# Patient Record
Sex: MALE | Race: WHITE | Employment: OTHER | URBAN - METROPOLITAN AREA
[De-identification: names, ages, dates, MRNs, and addresses within clinical notes are randomized per-mention and may not be internally consistent; named-entity substitution may affect disease eponyms.]

---

## 2017-02-07 ENCOUNTER — HOSPITAL ENCOUNTER (OUTPATIENT)
Dept: LAB | Age: 82
Discharge: HOME | End: 2017-02-07
Payer: MEDICARE

## 2017-02-07 DIAGNOSIS — I11.9 BENIGN HYPERTENSIVE HEART DISEASE WITHOUT HEART FAILURE: ICD-10-CM

## 2017-02-07 DIAGNOSIS — E78.5 DYSLIPIDEMIA: ICD-10-CM

## 2017-02-07 LAB
ALBUMIN SERPL BCP-MCNC: 3.7 G/DL (ref 3.4–5)
ALBUMIN/GLOB SERPL: 1.2 {RATIO} (ref 0.8–1.7)
ALP SERPL-CCNC: 71 U/L (ref 45–117)
ALT SERPL-CCNC: 18 U/L (ref 16–61)
ANION GAP BLD CALC-SCNC: 8 MMOL/L (ref 3–18)
AST SERPL W P-5'-P-CCNC: 19 U/L (ref 15–37)
BILIRUB SERPL-MCNC: 0.7 MG/DL (ref 0.2–1)
BUN SERPL-MCNC: 14 MG/DL (ref 7–18)
BUN/CREAT SERPL: 14 (ref 12–20)
CALCIUM SERPL-MCNC: 8.9 MG/DL (ref 8.5–10.1)
CHLORIDE SERPL-SCNC: 104 MMOL/L (ref 100–108)
CHOLEST SERPL-MCNC: 151 MG/DL
CO2 SERPL-SCNC: 29 MMOL/L (ref 21–32)
CREAT SERPL-MCNC: 0.98 MG/DL (ref 0.6–1.3)
GLOBULIN SER CALC-MCNC: 3.2 G/DL (ref 2–4)
GLUCOSE SERPL-MCNC: 95 MG/DL (ref 74–99)
HDLC SERPL-MCNC: 57 MG/DL (ref 40–60)
HDLC SERPL: 2.6 {RATIO} (ref 0–5)
LDLC SERPL CALC-MCNC: 80.6 MG/DL (ref 0–100)
LIPID PROFILE,FLP: NORMAL
POTASSIUM SERPL-SCNC: 4.3 MMOL/L (ref 3.5–5.5)
PROT SERPL-MCNC: 6.9 G/DL (ref 6.4–8.2)
SODIUM SERPL-SCNC: 141 MMOL/L (ref 136–145)
TRIGL SERPL-MCNC: 67 MG/DL (ref ?–150)
VLDLC SERPL CALC-MCNC: 13.4 MG/DL

## 2017-02-07 PROCEDURE — 80053 COMPREHEN METABOLIC PANEL: CPT | Performed by: INTERNAL MEDICINE

## 2017-02-07 PROCEDURE — 36415 COLL VENOUS BLD VENIPUNCTURE: CPT | Performed by: INTERNAL MEDICINE

## 2017-02-07 PROCEDURE — 80061 LIPID PANEL: CPT | Performed by: INTERNAL MEDICINE

## 2017-02-21 ENCOUNTER — OFFICE VISIT (OUTPATIENT)
Dept: INTERNAL MEDICINE CLINIC | Age: 82
End: 2017-02-21

## 2017-02-21 VITALS
TEMPERATURE: 97.6 F | RESPIRATION RATE: 18 BRPM | SYSTOLIC BLOOD PRESSURE: 134 MMHG | HEART RATE: 65 BPM | WEIGHT: 169 LBS | OXYGEN SATURATION: 99 % | HEIGHT: 68 IN | BODY MASS INDEX: 25.61 KG/M2 | DIASTOLIC BLOOD PRESSURE: 66 MMHG

## 2017-02-21 DIAGNOSIS — E78.5 DYSLIPIDEMIA: ICD-10-CM

## 2017-02-21 DIAGNOSIS — I11.9 BENIGN HYPERTENSIVE HEART DISEASE WITHOUT HEART FAILURE: ICD-10-CM

## 2017-02-21 DIAGNOSIS — Z13.39 SCREENING FOR ALCOHOLISM: ICD-10-CM

## 2017-02-21 DIAGNOSIS — Z00.00 ROUTINE GENERAL MEDICAL EXAMINATION AT A HEALTH CARE FACILITY: Primary | ICD-10-CM

## 2017-02-21 NOTE — PROGRESS NOTES
Patient is in the office today for a 6 month follow up, Medicare Wellness visit    Do you have an Advance Directive no  Do you want more information declined    1. Have you been to the ER, urgent care clinic since your last visit? Hospitalized since your last visit? No    2. Have you seen or consulted any other health care providers outside of the [address] [place] [address] since your last visit? Include any pap smears or colon screening. No        This is a Subsequent Medicare Annual Wellness Visit providing Personalized Prevention Plan Services (PPPS) (Performed 12 months after initial AWV and PPPS )    I have reviewed the patient's medical history in detail and updated the computerized patient record. History     Past Medical History   Diagnosis Date    Atrial fibrillation      paroxysmal   CHADS score 2  (-CHF, +HTN, +AGE, -DM, -CVA)    Back pain     Dyslipidemia     GERD     Hypertrension     Nephrolithiasis     Pacemaker      ,       [employer]    Sick sinus syndrome       Past Surgical History   Procedure Laterality Date    Hx appendectomy       in his early 's    Hx cataract removal         bilateral    Pr cardiac surg procedure unlist       Pacemaker     Current Outpatient Prescriptions   Medication Sig Dispense Refill    metoprolol succinate (TOPROL-XL) 50 mg XL tablet TAKE ONE TABLET BY MOUTH ONCE DAILY 30 Tab 11    budesonide (RHINOCORT AQUA) 32 mcg/actuation nasal spray 2 Sprays by Both Nostrils route daily.  calcium carbonate (TUMS) 200 mg calcium (500 mg) chew Take 1 Tab by mouth daily.        Allergies   Allergen Reactions    Lisinopril Other (comments)     Irregular heart rate     Family History   Problem Relation Age of Onset    Other Father 39      from intestinal blockage in [de-identified]    Stroke Mother 68     Social History   Substance Use Topics    Smoking status: Never Smoker    Smokeless tobacco: Never Used    Alcohol use 2.5 oz/week     5 Glasses of wine per week     Patient Active Problem List   Diagnosis Code    Hypertrension I11.9    Dyslipidemia E78.5    Atrial fibrillation I48.0    Sick sinus syndrome I49.5    ACP (advance care planning) Z71.89       Depression Risk Factor Screening:     PHQ 2 / 9, over the last two weeks 2/21/2017   Little interest or pleasure in doing things Not at all   Feeling down, depressed or hopeless Not at all   Total Score PHQ 2 0     Alcohol Risk Factor Screening:   Patient states he drinks one glass of red wine nightly. Functional Ability and Level of Safety:     Hearing Loss   Patient states he has slight hearing loss. Activities of Daily Living   Self-care. Requires assistance with: no ADLs    Fall Risk     Fall Risk Assessment, last 12 mths 2/21/2017   Able to walk? Yes   Fall in past 12 months? No     Abuse Screen   Patient is not abused    Review of Systems   A comprehensive review of systems was negative except for that written in the HPI. Physical Examination     Evaluation of Cognitive Function:  Mood/affect:  neutral  Appearance: age appropriate  Family member/caregiver input: none    Visit Vitals    /66 (BP 1 Location: Left arm, BP Patient Position: Sitting)    Pulse 65    Temp 97.6 °F (36.4 °C) (Oral)    Resp 18    Ht 5' 8\" (1.727 m)    Wt 169 lb (76.7 kg)    SpO2 99%    BMI 25.7 kg/m2       Patient Care Team:  Xin Muir MD as PCP - General (Internal Medicine)  Becca Sands MD (Cardiology)  Dennis Monge MD (Ophthalmology)    Advice/Referrals/Counseling   Education and counseling provided:  Are appropriate based on today's review and evaluation  End-of-Life planning (with patient's consent)  Pneumococcal Vaccine  Influenza Vaccine    Glaucoma Screening- 4 years ago pt encouraged to f/u  Pneumonia Vaccine- declines   Shingles Vaccine- declines  Tdap Vaccine- not UTD   Colonoscopy- Declines. Never had one.  Declines FIT test.   PSA- declines   Advance Directive- Does not have one. Will consider getting one. 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 Assessment/Plan       ICD-10-CM ICD-9-CM    1. Routine general medical examination at a health care facility Z00.00 V70.0    2. Screening for alcoholism Z13.89 V79.1    3. Hypertrension I11.9 402.10    4. Dyslipidemia E78.5 272.4    . A comprehensive 5 year plan for medical care and screening exams was reviewed with pt and they received a copy of it.

## 2017-02-21 NOTE — MR AVS SNAPSHOT
Visit Information Date & Time Provider Department Dept. Phone Encounter #  
 2/21/2017  1:00 PM Duncan Osei MD [facility] at [facility]  Follow-up Instructions Return in about 6 months (around 8/21/2017) for labs 1 week before. Your Appointments 3/15/2017  3:40 PM  
[facility] with [facility] ([address]) Appt Note: 3 mth carelink [place] [id number]  
[phone] [facility] [address] Upcoming Health Maintenance Date Due DTaP/Tdap/Td series (1 - Tdap) 2/27/1954 ZOSTER VACCINE AGE 60> 2/27/1993 GLAUCOMA SCREENING Q2Y 2/27/1998 INFLUENZA AGE 9 TO ADULT 8/1/2016 MEDICARE YEARLY EXAM 2/16/2017 Pneumococcal 65+ Low/Medium Risk (1 of 2 - PCV13) 2/28/2017* *Topic was postponed. The date shown is not the original due date. Allergies as of 2/21/2017  Review Complete On: 2/21/2017 By: Duncan Osei MD  
  
 Severity Noted Reaction Type Reactions Lisinopril    Other (comments) Irregular heart rate Current Immunizations  Never Reviewed No immunizations on file. Not reviewed this visit You Were Diagnosed With   
  
 Codes Comments Routine general medical examination at a health care facility    -  Primary ICD-10-CM: Z00.00 ICD-9-CM: V70.0 Screening for alcoholism     ICD-10-CM: Z13.89 ICD-9-CM: V79.1 Benign hypertensive heart disease without heart failure     ICD-10-CM: I11.9 ICD-9-CM: 402.10 Dyslipidemia     ICD-10-CM: E78.5 ICD-9-CM: 272.4 Vitals BP Pulse Temp Resp Height(growth percentile) Weight(growth percentile) 134/66 (BP 1 Location: Left arm, BP Patient Position: Sitting) 65 97.6 °F (36.4 °C) (Oral) 18 5' 8\" (1.727 m) 169 lb (76.7 kg) SpO2 BMI Smoking Status 99% 25.7 kg/m2 Never Smoker Vitals History BMI and BSA Data Body Mass Index Body Surface Area 25.7 kg/m 2 1.92 m 2 Preferred Pharmacy Pharmacy Name Phone [phone]  [phone] Your Updated Medication List  
  
   
This list is accurate as of: 2/21/17  1:25 PM.  Always use your most recent med list.  
  
  
  
  
 calcium carbonate 200 mg calcium (500 mg) Elma Hectoraldair Commonly known as:  TUMS Take 1 Tab by mouth daily. metoprolol succinate 50 mg XL tablet Commonly known as:  TOPROL-XL  
TAKE ONE TABLET BY MOUTH ONCE DAILY RHINOCORT AQUA 32 mcg/actuation nasal spray Generic drug:  budesonide 2 Sprays by Both Nostrils route daily. Follow-up Instructions Return in about 6 months (around 8/21/2017) for labs 1 week before. Patient Instructions Advance Directives: Care Instructions Your Care Instructions An advance directive is a legal way to state your wishes at the end of your life. It tells your family and your doctor what to do if you can no longer say what you want. There are two main types of advance directives. You can change them any time that your wishes change. · A living will tells your family and your doctor your wishes about life support and other treatment. · A medical power of  lets you name a person to make treatment decisions for you when you can't speak for yourself. This person is called a health care agent. If you do not have an advance directive, decisions about your medical care may be made by a doctor or a  who doesn't know you. It may help to think of an advance directive as a gift to the people who care for you. If you have one, they won't have to make tough decisions by themselves. Follow-up care is a key part of your treatment and safety.  Be sure to make and go to all appointments, and call your doctor if you are having problems. It's also a good idea to know your test results and keep a list of the medicines you take. How can you care for yourself at home? · Discuss your wishes with your loved ones and your doctor. This way, there are no surprises. · Many states have a unique form. Or you might use a universal form that has been approved by many states. This kind of form can sometimes be completed and stored online. Your electronic copy will then be available wherever you have a connection to the Internet. In most cases, doctors will respect your wishes even if you have a form from a different state. · You don't need a  to do an advance directive. But you may want to get legal advice. · Think about these questions when you prepare an advance directive: ¨ Who do you want to make decisions about your medical care if you are not able to? Many people choose a family member, close friend, or doctor. ¨ Do you know enough about life support methods that might be used? If not, talk to your doctor so you understand. ¨ What are you most afraid of that might happen? You might be afraid of having pain, losing your independence, or being kept alive by machines. ¨ Where would you prefer to die? Choices include your home, a hospital, or a nursing home. ¨ Would you like to have information about hospice care to support you and your family? ¨ Do you want to donate organs when you die? ¨ Do you want certain Druze practices performed before you die? If so, put your wishes in the advance directive. · Read your advance directive every year, and make changes as needed. When should you call for help? Be sure to contact your doctor if you have any questions. Where can you learn more? Go to http://kerline-bernabe.info/. Enter R264 in the search box to learn more about \"Advance Directives: Care Instructions. \" Current as of: February 24, 2016 Content Version: 11.1 © 4100-2004 [employer]. Care instructions adapted under license by [employer] (which disclaims liability or warranty for this information). If you have questions about a medical condition or this instruction, always ask your healthcare professional. Camilo Olmos any warranty or liability for your use of this information. Medicare Part B Preventive Services Limitations Recommendation Scheduled Bone Mass Measurement 
(age 72 & older, biennial) Requires diagnosis related to osteoporosis or estrogen deficiency. Biennial benefit unless patient has history of long-term glucocorticoid tx or baseline is needed because initial test was by other method  NA Cardiovascular Screening Blood Tests (every 5 years) Total cholesterol, HDL, Triglycerides Order as a panel if possible  2/2017 Colorectal Cancer Screening 
-Fecal occult blood test (annual) -Flexible sigmoidoscopy (5y) 
-Screening colonoscopy (10y) -Barium Enema   NA Counseling to Prevent Tobacco Use (up to 8 sessions per year) - Counseling greater than 3 and up to 10 minutes - Counseling greater than 10 minutes Patients must be asymptomatic of tobacco-related conditions to receive as preventive service  NA Diabetes Screening Tests (at least every 3 years, Medicare covers annually or at 6-month intervals for prediabetic patients) Fasting blood sugar (FBS) or glucose tolerance test (GTT) Patient must be diagnosed with one of the following: 
-Hypertension, Dyslipidemia, obesity, previous impaired FBS or GTT 
Or any two of the following: overweight, FH of diabetes, age ? 72, history of gestational diabetes, birth of baby weighing more than 9 pounds  2/2017 Diabetes Self-Management Training (DSMT) (no USPSTF recommendation) Requires referral by treating physician for patient with diabetes or renal disease.  10 hours of initial DSMT session of no less than 30 minutes each in a continuous 12-month period. 2 hours of follow-up DSMT in subsequent years. NA Glaucoma Screening (no USPSTF recommendation) Diabetes mellitus, family history, , age 48 or over,  American, age 72 or over  Dr. Robb Chacon Human Immunodeficiency Virus (HIV) Screening (annually for increased risk patients) HIV-1 and HIV-2 by EIA, BOWEN, rapid antibody test, or oral mucosa transudate Patient must be at increased risk for HIV infection per USPSTF guidelines or pregnant. Tests covered annually for patients at increased risk. Pregnant patients may receive up to 3 test during pregnancy. NA Medical Nutrition Therapy (MNT) (for diabetes or renal disease not recommended schedule) Requires referral by treating physician for patient with diabetes or renal disease. Can be provided in same year as diabetes self-management training (DSMT), and CMS recommends medical nutrition therapy take place after DSMT. Up to 3 hours for initial year and 2 hours in subsequent years. NA Prostate Cancer Screening (annually up to age 76) - Digital rectal exam (REMY) - Prostate specific antigen (PSA) Annually (age 48 or over), REMY not paid separately when covered E/M service is provided on same date Men up to age 76 may need a screening blood test for prostate cancer at certain intervals, depending on their personal and family history. This decision is between the patient and his provider. NA Seasonal Influenza Vaccination (annually)   declined Pneumococcal Vaccination (once after 65)   Declined Hepatitis B Vaccinations (if medium/high risk) Medium/high risk factors:  End-stage renal disease, Hemophiliacs who received Factor VIII or IX concentrates, Clients of institutions for the mentally retarded, Persons who live in the same house as a HepB virus carrier, Homosexual men, Illicit injectable drug abusers. NA Shingles Vaccination A shingles vaccine is also recommended once in a lifetime after age 61  Declined Ultrasound Screening for Abdominal Aortic Aneurysm (AAA) (once) Patient must be referred through IPPE and not have had a screening for abdominal aortic aneurysm before under Medicare. Limited to patients who meet one of the following criteria: 
- Men who are 73-68 years old and have smoked more than 100 cigarettes in their lifetime. 
-Anyone with a FH of AAA 
-Anyone recommended for screening by USPSTF  NA Introducing [facility]! [employer] introduces EarlySense patient portal. Now you can access parts of your medical record, email your doctor's office, and request medication refills online. 1. In your internet browser, go to [email or web address] 2. Click on the First Time User? Click Here link in the Sign In box. You will see the New Member Sign Up page. 3. Enter your EarlySense Access Code exactly as it appears below. You will not need to use this code after youve completed the sign-up process. If you do not sign up before the expiration date, you must request a new code. · EarlySense Access Code: [email or web address] Expires: 3/9/2017 11:20 AM 
 
4. Enter the last four digits of your Social Security Number (xxxx) and Date of Birth (mm/dd/yyyy) as indicated and click Submit. You will be taken to the next sign-up page. 5. Create a EarlySense ID. This will be your EarlySense login ID and cannot be changed, so think of one that is secure and easy to remember. 6. Create a EarlySense password. You can change your password at any time. 7. Enter your Password Reset Question and Answer. This can be used at a later time if you forget your password. 8. Enter your e-mail address. You will receive e-mail notification when new information is available in [address] 9. Click Sign Up. You can now view and download portions of your medical record. 10. Click the Download Summary menu link to download a portable copy of your medical information. If you have questions, please visit the Frequently Asked Questions section of the [employer] website. Remember, EeBria is NOT to be used for urgent needs. For medical emergencies, dial 911. Now available from your iPhone and Android! Please provide this summary of care documentation to your next provider. Your primary care clinician is listed as JOHAN NUGENT. If you have any questions after today's visit, please call [phone].

## 2017-02-21 NOTE — PROGRESS NOTES
Subjective:       Chief Complaint  The patient presents for follow up of hypertension and high cholesterol. PATRIC Armstrong is a 80 y.o. male seen for follow up of hyperlipidemia. Healso has hypertension. Hypertension well controlled, no significant medication side effects noted, on Toprol, hyperlipidemia fairly well controlled at age 80. Pt is followed by cardiology. Allergies are stable on Zyrtec and Nasacort prn and allergy shots. Diet and Lifestyle: generally follows a low fat low cholesterol diet, exercises regularly    Home BP Monitoring: is not measured at home. Other symptoms and concerns: pt's back pain is much improved with doing core exercises daily. Pt has some occasional dizziness when he plays golf but it improves when he drinks fluids. Current Outpatient Prescriptions   Medication Sig    metoprolol succinate (TOPROL-XL) 50 mg XL tablet TAKE ONE TABLET BY MOUTH ONCE DAILY    budesonide (RHINOCORT AQUA) 32 mcg/actuation nasal spray 2 Sprays by Both Nostrils route daily.  calcium carbonate (TUMS) 200 mg calcium (500 mg) chew Take 1 Tab by mouth daily. No current facility-administered medications for this visit.               Review of Systems  Respiratory: negative for dyspnea on exertion  Cardiovascular: negative for chest pain    Objective:     Visit Vitals    /66 (BP 1 Location: Left arm, BP Patient Position: Sitting)    Pulse 65    Temp 97.6 °F (36.4 °C) (Oral)    Resp 18    Ht 5' 8\" (1.727 m)    Wt 169 lb (76.7 kg)    SpO2 99%    BMI 25.7 kg/m2        Eyes - pupils equal and reactive, extraocular eye movements intact  Mouth - mucous membranes moist, pharynx normal without lesions  Neck - supple, no significant adenopathy, carotids upstroke normal bilaterally, no bruits  Chest - clear to auscultation, no wheezes, rales or rhonchi, symmetric air entry  Heart - normal rate, regular rhythm, normal S1, S2, 3/5 systolic murmur at RSB  Extremities - peripheral pulses normal, no pedal edema, no clubbing or cyanosis      Labs:   Lab Results   Component Value Date/Time    Cholesterol, total 151 02/07/2017 08:53 AM    HDL Cholesterol 57 02/07/2017 08:53 AM    LDL, calculated 80.6 02/07/2017 08:53 AM    Triglyceride 67 02/07/2017 08:53 AM    CHOL/HDL Ratio 2.6 02/07/2017 08:53 AM     Lab Results   Component Value Date/Time    ALT (SGPT) 18 02/07/2017 08:53 AM    AST (SGOT) 19 02/07/2017 08:53 AM    Alk. phosphatase 71 02/07/2017 08:53 AM    Bilirubin, total 0.7 02/07/2017 08:53 AM     Lab Results   Component Value Date/Time    GFR est AA >60 02/07/2017 08:53 AM    GFR est non-AA >60 02/07/2017 08:53 AM    Creatinine 0.98 02/07/2017 08:53 AM    BUN 14 02/07/2017 08:53 AM    Sodium 141 02/07/2017 08:53 AM    Potassium 4.3 02/07/2017 08:53 AM    Chloride 104 02/07/2017 08:53 AM    CO2 29 02/07/2017 08:53 AM              Assessment / Plan     Hypertension well controlled, on Toprol  Hyperlipidemia fairly well controlled with diet       ICD-10-CM ICD-9-CM    1. Routine general medical examination at a health care facility Z00.00 V70.0    2. Screening for alcoholism Z13.89 V79.1    3. Hypertrension J68.0 772.80 METABOLIC PANEL, COMPREHENSIVE   4. Dyslipidemia E78.5 272.4 LIPID PANEL                 Low cholesterol diet, weight control and daily exercise discussed. Follow-up Disposition:  Return in about 6 months (around 8/21/2017) for labs 1 week before. Reviewed plan of care. Patient has provided input and agrees with goals.

## 2017-02-21 NOTE — PATIENT INSTRUCTIONS
Advance Directives: Care Instructions  Your Care Instructions  An advance directive is a legal way to state your wishes at the end of your life. It tells your family and your doctor what to do if you can no longer say what you want. There are two main types of advance directives. You can change them any time that your wishes change. · A living will tells your family and your doctor your wishes about life support and other treatment. · A medical power of  lets you name a person to make treatment decisions for you when you can't speak for yourself. This person is called a health care agent. If you do not have an advance directive, decisions about your medical care may be made by a doctor or a  who doesn't know you. It may help to think of an advance directive as a gift to the people who care for you. If you have one, they won't have to make tough decisions by themselves. Follow-up care is a key part of your treatment and safety. Be sure to make and go to all appointments, and call your doctor if you are having problems. It's also a good idea to know your test results and keep a list of the medicines you take. How can you care for yourself at home? · Discuss your wishes with your loved ones and your doctor. This way, there are no surprises. · Many states have a unique form. Or you might use a universal form that has been approved by many states. This kind of form can sometimes be completed and stored online. Your electronic copy will then be available wherever you have a connection to the Internet. In most cases, doctors will respect your wishes even if you have a form from a different state. · You don't need a  to do an advance directive. But you may want to get legal advice. · Think about these questions when you prepare an advance directive:  ¨ Who do you want to make decisions about your medical care if you are not able to?  Many people choose a family member, close friend, or doctor. ¨ Do you know enough about life support methods that might be used? If not, talk to your doctor so you understand. ¨ What are you most afraid of that might happen? You might be afraid of having pain, losing your independence, or being kept alive by machines. ¨ Where would you prefer to die? Choices include your home, a hospital, or a nursing home. ¨ Would you like to have information about hospice care to support you and your family? ¨ Do you want to donate organs when you die? ¨ Do you want certain Roman Catholic practices performed before you die? If so, put your wishes in the advance directive. · Read your advance directive every year, and make changes as needed. When should you call for help? Be sure to contact your doctor if you have any questions. Where can you learn more? Go to http://kerlinecheerappbernabe.info/. Enter R264 in the search box to learn more about \"Advance Directives: Care Instructions. \"  Current as of: February 24, 2016  Content Version: 11.1  © 1343-4148 [employer]. Care instructions adapted under license by [employer] (which disclaims liability or warranty for this information). If you have questions about a medical condition or this instruction, always ask your healthcare professional. [place] [address] any warranty or liability for your use of this information. Medicare Part B Preventive Services Limitations Recommendation Scheduled   Bone Mass Measurement  (age 72 & older, biennial) Requires diagnosis related to osteoporosis or estrogen deficiency.  Biennial benefit unless patient has history of long-term glucocorticoid tx or baseline is needed because initial test was by other method  NA   Cardiovascular Screening Blood Tests (every 5 years)  Total cholesterol, HDL, Triglycerides Order as a panel if possible  2/2017   Colorectal Cancer Screening  -Fecal occult blood test (annual)  -Flexible sigmoidoscopy (5y)  -Screening colonoscopy (10y)  -Barium Enema   NA   Counseling to Prevent Tobacco Use (up to 8 sessions per year)  - Counseling greater than 3 and up to 10 minutes  - Counseling greater than 10 minutes Patients must be asymptomatic of tobacco-related conditions to receive as preventive service  NA   Diabetes Screening Tests (at least every 3 years, Medicare covers annually or at 6-month intervals for prediabetic patients)    Fasting blood sugar (FBS) or glucose tolerance test (GTT) Patient must be diagnosed with one of the following:  -Hypertension, Dyslipidemia, obesity, previous impaired FBS or GTT  Or any two of the following: overweight, FH of diabetes, age ? 72, history of gestational diabetes, birth of baby weighing more than 9 pounds  2/2017   Diabetes Self-Management Training (DSMT) (no USPSTF recommendation) Requires referral by treating physician for patient with diabetes or renal disease. 10 hours of initial DSMT session of no less than 30 minutes each in a continuous 12-month period. 2 hours of follow-up DSMT in subsequent years. NA   Glaucoma Screening (no USPSTF recommendation) Diabetes mellitus, family history, , age 48 or over,  American, age 72 or over  Dr. Ellie Mcgee (HIV) Screening (annually for increased risk patients)  HIV-1 and HIV-2 by EIA, BOWEN, rapid antibody test, or oral mucosa transudate Patient must be at increased risk for HIV infection per USPSTF guidelines or pregnant. Tests covered annually for patients at increased risk. Pregnant patients may receive up to 3 test during pregnancy. NA   Medical Nutrition Therapy (MNT) (for diabetes or renal disease not recommended schedule) Requires referral by treating physician for patient with diabetes or renal disease. Can be provided in same year as diabetes self-management training (DSMT), and CMS recommends medical nutrition therapy take place after DSMT.   Up to 3 hours for initial year and 2 hours in subsequent years. NA   Prostate Cancer Screening (annually up to age 76)  - Digital rectal exam (REMY)  - Prostate specific antigen (PSA) Annually (age 48 or over), REMY not paid separately when covered E/M service is provided on same date  Men up to age 76 may need a screening blood test for prostate cancer at certain intervals, depending on their personal and family history. This decision is between the patient and his provider. NA   Seasonal Influenza Vaccination (annually)   declined     Pneumococcal Vaccination (once after 72)   Declined   Hepatitis B Vaccinations (if medium/high risk) Medium/high risk factors:  End-stage renal disease,  Hemophiliacs who received Factor VIII or IX concentrates, Clients of institutions for the mentally retarded, Persons who live in the same house as a HepB virus carrier, Homosexual men, Illicit injectable drug abusers. NA   Shingles Vaccination A shingles vaccine is also recommended once in a lifetime after age 61  Declined   Ultrasound Screening for Abdominal Aortic Aneurysm (AAA) (once) Patient must be referred through [facility] and not have had a screening for abdominal aortic aneurysm before under Medicare.   Limited to patients who meet one of the following criteria:  - Men who are 73-68 years old and have smoked more than 100 cigarettes in their lifetime.  -Anyone with a FH of AAA  -Anyone recommended for screening by USPSTF  NA

## 2017-03-15 ENCOUNTER — OFFICE VISIT (OUTPATIENT)
Dept: CARDIOLOGY CLINIC | Age: 82
End: 2017-03-15

## 2017-03-15 DIAGNOSIS — I49.5 SICK SINUS SYNDROME (HCC): Primary | ICD-10-CM

## 2017-03-15 DIAGNOSIS — Z95.0 CARDIAC PACEMAKER IN SITU: ICD-10-CM

## 2017-04-21 NOTE — PROGRESS NOTES
I have personally seen and evaluated the device findings. Interrogation reviewed and I agree with assessment.     Jordon Velez

## 2017-06-21 ENCOUNTER — OFFICE VISIT (OUTPATIENT)
Dept: CARDIOLOGY CLINIC | Age: 82
End: 2017-06-21

## 2017-06-21 DIAGNOSIS — I48.0 PAROXYSMAL ATRIAL FIBRILLATION (HCC): Primary | ICD-10-CM

## 2017-06-21 DIAGNOSIS — I49.5 SICK SINUS SYNDROME (HCC): ICD-10-CM

## 2017-06-21 DIAGNOSIS — Z95.9 CARDIAC DEVICE IN SITU: ICD-10-CM

## 2017-06-28 NOTE — PROGRESS NOTES
I have personally seen and evaluated the device findings. Interrogation reviewed and I agree with assessment.     Christian Funes

## 2017-08-07 ENCOUNTER — HOSPITAL ENCOUNTER (OUTPATIENT)
Dept: LAB | Age: 82
Discharge: HOME | End: 2017-08-07
Payer: MEDICARE

## 2017-08-07 DIAGNOSIS — I11.9 BENIGN HYPERTENSIVE HEART DISEASE WITHOUT HEART FAILURE: ICD-10-CM

## 2017-08-07 DIAGNOSIS — E78.5 DYSLIPIDEMIA: ICD-10-CM

## 2017-08-07 LAB
ALBUMIN SERPL BCP-MCNC: 3.6 G/DL (ref 3.4–5)
ALBUMIN/GLOB SERPL: 1.1 {RATIO} (ref 0.8–1.7)
ALP SERPL-CCNC: 69 U/L (ref 45–117)
ALT SERPL-CCNC: 15 U/L (ref 16–61)
ANION GAP BLD CALC-SCNC: 9 MMOL/L (ref 3–18)
AST SERPL W P-5'-P-CCNC: 16 U/L (ref 15–37)
BILIRUB SERPL-MCNC: 0.6 MG/DL (ref 0.2–1)
BUN SERPL-MCNC: 17 MG/DL (ref 7–18)
BUN/CREAT SERPL: 17 (ref 12–20)
CALCIUM SERPL-MCNC: 8.6 MG/DL (ref 8.5–10.1)
CHLORIDE SERPL-SCNC: 105 MMOL/L (ref 100–108)
CHOLEST SERPL-MCNC: 147 MG/DL
CO2 SERPL-SCNC: 27 MMOL/L (ref 21–32)
CREAT SERPL-MCNC: 1 MG/DL (ref 0.6–1.3)
GLOBULIN SER CALC-MCNC: 3.2 G/DL (ref 2–4)
GLUCOSE SERPL-MCNC: 92 MG/DL (ref 74–99)
HDLC SERPL-MCNC: 59 MG/DL (ref 40–60)
HDLC SERPL: 2.5 {RATIO} (ref 0–5)
LDLC SERPL CALC-MCNC: 76.4 MG/DL (ref 0–100)
LIPID PROFILE,FLP: NORMAL
POTASSIUM SERPL-SCNC: 4.7 MMOL/L (ref 3.5–5.5)
PROT SERPL-MCNC: 6.8 G/DL (ref 6.4–8.2)
SODIUM SERPL-SCNC: 141 MMOL/L (ref 136–145)
TRIGL SERPL-MCNC: 58 MG/DL (ref ?–150)
VLDLC SERPL CALC-MCNC: 11.6 MG/DL

## 2017-08-07 PROCEDURE — 80061 LIPID PANEL: CPT | Performed by: INTERNAL MEDICINE

## 2017-08-07 PROCEDURE — 36415 COLL VENOUS BLD VENIPUNCTURE: CPT | Performed by: INTERNAL MEDICINE

## 2017-08-07 PROCEDURE — 80053 COMPREHEN METABOLIC PANEL: CPT | Performed by: INTERNAL MEDICINE

## 2017-08-18 ENCOUNTER — OFFICE VISIT (OUTPATIENT)
Dept: INTERNAL MEDICINE CLINIC | Age: 82
End: 2017-08-18

## 2017-08-18 VITALS
RESPIRATION RATE: 20 BRPM | HEART RATE: 66 BPM | WEIGHT: 160 LBS | OXYGEN SATURATION: 99 % | BODY MASS INDEX: 24.25 KG/M2 | DIASTOLIC BLOOD PRESSURE: 59 MMHG | HEIGHT: 68 IN | SYSTOLIC BLOOD PRESSURE: 115 MMHG | TEMPERATURE: 98.2 F

## 2017-08-18 DIAGNOSIS — I11.9 BENIGN HYPERTENSIVE HEART DISEASE WITHOUT HEART FAILURE: Primary | ICD-10-CM

## 2017-08-18 DIAGNOSIS — I48.0 PAROXYSMAL ATRIAL FIBRILLATION (HCC): ICD-10-CM

## 2017-08-18 NOTE — PROGRESS NOTES
Patient is in the office today for a 6 month follow up. 1. Have you been to the ER, urgent care clinic since your last visit? Hospitalized since your last visit? No    2. Have you seen or consulted any other health care providers outside of the [address] [place] [address] since your last visit? Include any pap smears or colon screening.  No

## 2017-08-18 NOTE — MR AVS SNAPSHOT
Visit Information Date & Time Provider Department Dept. Phone Encounter #  
 8/18/2017  2:00 PM Nelly Gonzales MD [facility] 8 [id number] Follow-up Instructions Return in about 6 months (around 2/18/2018) for OV, and Medicare Wellness Visit, labs 1 week before. Your Appointments 9/27/2017  3:20 PM  
[facility] with [facility] ([address]) Appt Note: Darren Lozanonellkavita Flores [address] [id number]  
[phone] [address] Upcoming Health Maintenance Date Due DTaP/Tdap/Td series (1 - Tdap) 2/27/1954 ZOSTER VACCINE AGE 60> 12/27/1992 GLAUCOMA SCREENING Q2Y 2/27/1998 Pneumococcal 65+ Low/Medium Risk (1 of 2 - PCV13) 2/27/1998 INFLUENZA AGE 9 TO ADULT 8/1/2017 MEDICARE YEARLY EXAM 2/22/2018 Allergies as of 8/18/2017  Review Complete On: 8/18/2017 By: Nelly Gonzales MD  
  
 Severity Noted Reaction Type Reactions Lisinopril    Other (comments) Irregular heart rate Current Immunizations  Never Reviewed No immunizations on file. Not reviewed this visit You Were Diagnosed With   
  
 Codes Comments Benign hypertensive heart disease without heart failure    -  Primary ICD-10-CM: I11.9 ICD-9-CM: 402.10 Paroxysmal atrial fibrillation (HCC)     ICD-10-CM: I48.0 ICD-9-CM: 427.31 Vitals BP Pulse Temp Resp Height(growth percentile) Weight(growth percentile) 115/59 (BP 1 Location: Left arm, BP Patient Position: Sitting) 66 98.2 °F (36.8 °C) (Oral) 20 5' 8\" (1.727 m) 160 lb (72.6 kg) SpO2 BMI Smoking Status 99% 24.33 kg/m2 Never Smoker Vitals History BMI and BSA Data Body Mass Index Body Surface Area  
 24.33 kg/m 2 1.87 m 2 Preferred Pharmacy Pharmacy Name Phone [phone] [facility] [phone] Your Updated Medication List  
  
   
This list is accurate as of: 8/18/17  2:18 PM.  Always use your most recent med list.  
  
  
  
  
 metoprolol succinate 50 mg XL tablet Commonly known as:  TOPROL-XL  
TAKE ONE TABLET BY MOUTH ONCE DAILY RHINOCORT AQUA 32 mcg/actuation nasal spray Generic drug:  budesonide 2 Sprays by Both Nostrils route daily. Follow-up Instructions Return in about 6 months (around 2/18/2018) for OV, and Medicare Wellness Visit, labs 1 week before. Patient Instructions How to Read a Food Label to Limit Sodium: Care Instructions Your Care Instructions Sodium causes your body to hold on to extra water. This can raise your blood pressure and force your heart and kidneys to work harder. In very serious cases, this could cause you to be put in the hospital. It might even be life-threatening. By limiting sodium, you will feel better and lower your risk of serious problems. Processed foods, fast food, and restaurant foods are the major sources of dietary sodium. The most common name for sodium is salt. Try to limit how much sodium you eat to less than 2,300 milligrams (mg) a day. If you limit your sodium to 1,500 mg a day, you can lower your blood pressure even more. This limit counts all the salt that you eat in foods you cook or in packaged foods. Keep a list of everything you eat and drink. Follow-up care is a key part of your treatment and safety. Be sure to make and go to all appointments, and call your doctor if you are having problems. It's also a good idea to know your test results and keep a list of the medicines you take. How can you care for yourself at home? Read ingredient lists on food labels · Read the list of ingredients on food labels to help you find how much sodium is in a food.  The label lists the ingredients in a food in descending order (from the most to the least). If salt or sodium is high on the list, there may be a lot of sodium in the food. · Know that sodium has different names. Sodium is also called monosodium glutamate (MSG, common in [facility] food), sodium citrate, sodium alginate, sodium hydroxide, and sodium phosphate. Read Nutrition Facts labels · On most foods, there is a Nutrition Facts label. This will tell you how much sodium is in one serving of food. Look at both the serving size and the sodium amount. The serving size is located at the top of the label, usually right under the \"Nutrition Facts\" title. The amount of sodium is given in the list under the title. It is given in milligrams (mg). ¨ Check the serving size carefully. A single serving is often very small, and you may eat more than one serving. If this is the case, you will eat more sodium than listed on the label. For example, if the serving size for a canned soup is 1 cup and the sodium amount is 470 mg, if you have 2 cups you will eat 940 mg of sodium. · The nutrition facts for fresh fruits and vegetables are not listed on the food. They may be listed somewhere in the store. These foods usually have no sodium or low sodium. · The Nutrition Facts label also gives you the Percent Daily Value for sodium. This is how much of the recommended amount of sodium a serving contains. The daily value for sodium is less than 2,300 mg. So if the Percent Daily Value says 50%, this means one serving is giving you half of this, or 1,150 mg. Buy low-sodium foods · Look for foods that are made with less sodium. Watch for the following words on the label. ¨ \"Unsalted\" means there is no sodium added to the food. But there may be sodium already in the food naturally. ¨ \"Sodium-free\" means a serving has less than 5 mg of sodium. ¨ \"Very low sodium\" means a serving has 35 mg or less of sodium. ¨ \"Low-sodium\" means a serving has 140 mg or less of sodium. · \"Reduced-sodium\" means that there is 25% less sodium than what the food normally has. This is still usually too much sodium. Try not to buy foods with this on the label. · Buy fresh vegetables, or frozen vegetables without added sauces. Buy low-sodium versions of canned vegetables, soups, and other canned goods. Where can you learn more? Go to http://kerline-bernabe.info/. Enter 26 [id number] in the search box to learn more about \"How to Read a Food Label to Limit Sodium: Care Instructions. \" Current as of: July 26, 2016 Content Version: 11.3 © 2840-2044 [employer]. Care instructions adapted under license by [employer] (which disclaims liability or warranty for this information). If you have questions about a medical condition or this instruction, always ask your healthcare professional. [address] any warranty or liability for your use of this information. Introducing [facility]! Dimitris Gandhi introduces MobileMD patient portal. Now you can access parts of your medical record, email your doctor's office, and request medication refills online. 1. In your internet browser, go to [email or web address] 2. Click on the First Time User? Click Here link in the Sign In box. You will see the New Member Sign Up page. 3. Enter your MobileMD Access Code exactly as it appears below. You will not need to use this code after youve completed the sign-up process. If you do not sign up before the expiration date, you must request a new code. · MobileMD Access Code: [email or web address] Expires: 11/16/2017  1:43 PM 
 
4. Enter the last four digits of your Social Security Number (xxxx) and Date of Birth (mm/dd/yyyy) as indicated and click Submit. You will be taken to the next sign-up page. 5. Create a MobileMD ID. This will be your MobileMD login ID and cannot be changed, so think of one that is secure and easy to remember. 6. Create a [employer] password. You can change your password at any time. 7. Enter your Password Reset Question and Answer. This can be used at a later time if you forget your password. 8. Enter your e-mail address. You will receive e-mail notification when new information is available in [address] 9. Click Sign Up. You can now view and download portions of your medical record. 10. Click the Download Summary menu link to download a portable copy of your medical information. If you have questions, please visit the Frequently Asked Questions section of the [employer] website. Remember, [employer] is NOT to be used for urgent needs. For medical emergencies, dial 911. Now available from your iPhone and Android! Please provide this summary of care documentation to your next provider. Your primary care clinician is listed as JOHAN NUGENT. If you have any questions after today's visit, please call [phone].

## 2017-08-18 NOTE — PATIENT INSTRUCTIONS
How to Read a Food Label to Limit Sodium: Care Instructions  Your Care Instructions  Sodium causes your body to hold on to extra water. This can raise your blood pressure and force your heart and kidneys to work harder. In very serious cases, this could cause you to be put in the hospital. It might even be life-threatening. By limiting sodium, you will feel better and lower your risk of serious problems. Processed foods, fast food, and restaurant foods are the major sources of dietary sodium. The most common name for sodium is salt. Try to limit how much sodium you eat to less than 2,300 milligrams (mg) a day. If you limit your sodium to 1,500 mg a day, you can lower your blood pressure even more. This limit counts all the salt that you eat in foods you cook or in packaged foods. Keep a list of everything you eat and drink. Follow-up care is a key part of your treatment and safety. Be sure to make and go to all appointments, and call your doctor if you are having problems. It's also a good idea to know your test results and keep a list of the medicines you take. How can you care for yourself at home? Read ingredient lists on food labels  · Read the list of ingredients on food labels to help you find how much sodium is in a food. The label lists the ingredients in a food in descending order (from the most to the least). If salt or sodium is high on the list, there may be a lot of sodium in the food. · Know that sodium has different names. Sodium is also called monosodium glutamate (MSG, common in [facility] food), sodium citrate, sodium alginate, sodium hydroxide, and sodium phosphate. Read Nutrition Facts labels  · On most foods, there is a Nutrition Facts label. This will tell you how much sodium is in one serving of food. Look at both the serving size and the sodium amount. The serving size is located at the top of the label, usually right under the \"Nutrition Facts\" title.  The amount of sodium is given in the list under the title. It is given in milligrams (mg). ¨ Check the serving size carefully. A single serving is often very small, and you may eat more than one serving. If this is the case, you will eat more sodium than listed on the label. For example, if the serving size for a canned soup is 1 cup and the sodium amount is 470 mg, if you have 2 cups you will eat 940 mg of sodium. · The nutrition facts for fresh fruits and vegetables are not listed on the food. They may be listed somewhere in the store. These foods usually have no sodium or low sodium. · The Nutrition Facts label also gives you the Percent Daily Value for sodium. This is how much of the recommended amount of sodium a serving contains. The daily value for sodium is less than 2,300 mg. So if the Percent Daily Value says 50%, this means one serving is giving you half of this, or 1,150 mg. Buy low-sodium foods  · Look for foods that are made with less sodium. Watch for the following words on the label. ¨ \"Unsalted\" means there is no sodium added to the food. But there may be sodium already in the food naturally. ¨ \"Sodium-free\" means a serving has less than 5 mg of sodium. ¨ \"Very low sodium\" means a serving has 35 mg or less of sodium. ¨ \"Low-sodium\" means a serving has 140 mg or less of sodium. · \"Reduced-sodium\" means that there is 25% less sodium than what the food normally has. This is still usually too much sodium. Try not to buy foods with this on the label. · Buy fresh vegetables, or frozen vegetables without added sauces. Buy low-sodium versions of canned vegetables, soups, and other canned goods. Where can you learn more? Go to http://kerline-bernabe.info/. Enter 26 [id number] in the search box to learn more about \"How to Read a Food Label to Limit Sodium: Care Instructions. \"  Current as of: July 26, 2016  Content Version: 11.3  © 0981-2377 [employer].  Care instructions adapted under license by [employer] (which disclaims liability or warranty for this information). If you have questions about a medical condition or this instruction, always ask your healthcare professional. [address] any warranty or liability for your use of this information.

## 2017-08-18 NOTE — PROGRESS NOTES
Subjective:       Chief Complaint  The patient presents for follow up of hypertension and high cholesterol. HPI  Pat Barone is a 80 y.o. male seen for follow up of hyperlipidemia. Healso has hypertension. Hypertension well controlled, no significant medication side effects noted, on Toprol, hyperlipidemia fairly well controlled at age 80. Pt is followed by cardiology. Allergies are stable on Zyrtec and Nasacort prn and allergy shots. Diet and Lifestyle: generally follows a low fat low cholesterol diet, exercises regularly    Home BP Monitoring: is not measured at home. Other symptoms and concerns: pt's back pain is much improved with doing core exercises daily. Pt is followed by cardiology for his afib        Current Outpatient Prescriptions   Medication Sig    metoprolol succinate (TOPROL-XL) 50 mg XL tablet TAKE ONE TABLET BY MOUTH ONCE DAILY    budesonide (RHINOCORT AQUA) 32 mcg/actuation nasal spray 2 Sprays by Both Nostrils route daily. No current facility-administered medications for this visit.               Review of Systems  Respiratory: negative for dyspnea on exertion  Cardiovascular: negative for chest pain    Objective:     Visit Vitals    /59 (BP 1 Location: Left arm, BP Patient Position: Sitting)    Pulse 66    Temp 98.2 °F (36.8 °C) (Oral)    Resp 20    Ht 5' 8\" (1.727 m)    Wt 160 lb (72.6 kg)    SpO2 99%    BMI 24.33 kg/m2        Eyes - pupils equal and reactive, extraocular eye movements intact  Mouth - mucous membranes moist, pharynx normal without lesions  Neck - supple, no significant adenopathy, carotids upstroke normal bilaterally, no bruits  Chest - clear to auscultation, no wheezes, rales or rhonchi, symmetric air entry  Heart - normal rate, regular rhythm, normal S1, S2, 3/5 systolic murmur at RSB  Extremities - peripheral pulses normal, no pedal edema, no clubbing or cyanosis      Labs:   Lab Results   Component Value Date/Time    Cholesterol, total 147 08/07/2017 07:49 AM    HDL Cholesterol 59 08/07/2017 07:49 AM    LDL, calculated 76.4 08/07/2017 07:49 AM    Triglyceride 58 08/07/2017 07:49 AM    CHOL/HDL Ratio 2.5 08/07/2017 07:49 AM     Lab Results   Component Value Date/Time    ALT (SGPT) 15 08/07/2017 07:49 AM    AST (SGOT) 16 08/07/2017 07:49 AM    Alk. phosphatase 69 08/07/2017 07:49 AM    Bilirubin, total 0.6 08/07/2017 07:49 AM     Lab Results   Component Value Date/Time    GFR est AA >60 08/07/2017 07:49 AM    GFR est non-AA >60 08/07/2017 07:49 AM    Creatinine 1.00 08/07/2017 07:49 AM    BUN 17 08/07/2017 07:49 AM    Sodium 141 08/07/2017 07:49 AM    Potassium 4.7 08/07/2017 07:49 AM    Chloride 105 08/07/2017 07:49 AM    CO2 27 08/07/2017 07:49 AM              Assessment / Plan     Hypertension well controlled, on Toprol  Hyperlipidemia fairly well controlled with diet       ICD-10-CM ICD-9-CM    1. Hypertrension X16.5 017.38 METABOLIC PANEL, COMPREHENSIVE   2. Atrial fibrillation I48.0 427.31 Pt followed by cardiology                  Low cholesterol diet, weight control and daily exercise discussed. Follow-up Disposition:  Return in about 6 months (around 2/18/2018) for OV, and Medicare Wellness Visit, labs 1 week before. Reviewed plan of care. Patient has provided input and agrees with goals.

## 2017-09-27 ENCOUNTER — OFFICE VISIT (OUTPATIENT)
Dept: CARDIOLOGY CLINIC | Age: 82
End: 2017-09-27

## 2017-09-27 DIAGNOSIS — I49.5 SICK SINUS SYNDROME (HCC): Primary | ICD-10-CM

## 2017-09-27 DIAGNOSIS — Z95.0 CARDIAC PACEMAKER IN SITU: ICD-10-CM

## 2017-09-29 NOTE — PROGRESS NOTES
I have personally seen and evaluated the device findings. Interrogation reviewed and I agree with assessment.     Malaika Delaney

## 2017-10-05 ENCOUNTER — OFFICE VISIT (OUTPATIENT)
Dept: ORTHOPEDIC SURGERY | Age: 82
End: 2017-10-05

## 2017-10-05 VITALS
WEIGHT: 160 LBS | TEMPERATURE: 97.8 F | HEART RATE: 69 BPM | HEIGHT: 68 IN | BODY MASS INDEX: 24.25 KG/M2 | SYSTOLIC BLOOD PRESSURE: 126 MMHG | DIASTOLIC BLOOD PRESSURE: 67 MMHG | RESPIRATION RATE: 18 BRPM | OXYGEN SATURATION: 98 %

## 2017-10-05 DIAGNOSIS — M43.16 SPONDYLOLISTHESIS AT L4-L5 LEVEL: ICD-10-CM

## 2017-10-05 DIAGNOSIS — M48.061 SPINAL STENOSIS AT L4-L5 LEVEL: Primary | ICD-10-CM

## 2017-10-05 NOTE — MR AVS SNAPSHOT
Visit Information Date & Time Provider Department Dept. Phone Encounter #  
 10/5/2017 10:30 AM Sunni Carlson  [facility] [address] [facility] [phone] [id number] Follow-up Instructions Return for after injections. Your Appointments 11/20/2017 11:00 AM  
Follow Up with Sunni Carlson MD  
[facility] ([facility]) Appt Note: BLOCK FU; FINN 10/31/17  
 [address] [facility] [address]  
[address] [facility] [address] [facility],[address] [facility] [address]  
  
    
 1/3/2018  4:00 PM  
CARELINK with Bradley  [facility] ([facility]) Appt Note: [address] [place] [address],[address] [place] [id number] [phone] [address] [facility] [address]  [address] Upcoming Health Maintenance Date Due DTaP/Tdap/Td series (1 - Tdap) 2/27/1954 ZOSTER VACCINE AGE 60> 12/27/1992 GLAUCOMA SCREENING Q2Y 2/27/1998 Pneumococcal 65+ Low/Medium Risk (1 of 2 - PCV13) 2/27/1998 INFLUENZA AGE 9 TO ADULT 8/1/2017 MEDICARE YEARLY EXAM 2/22/2018 Allergies as of 10/5/2017  Review Complete On: 10/5/2017 By: Sunni Carlson MD  
  
 Severity Noted Reaction Type Reactions Lisinopril    Other (comments) Irregular heart rate Other Plant, Animal, Environmental Low 10/05/2017    Other (comments) Grass, dust, and maple trees causes congestion Current Immunizations  Never Reviewed No immunizations on file. Not reviewed this visit You Were Diagnosed With   
  
 Codes Comments Spinal stenosis at L4-L5 level    -  Primary ICD-10-CM: M48.061 
ICD-9-CM: 724.02 Spondylolisthesis at L4-L5 level     ICD-10-CM: M43.16 
ICD-9-CM: 756.12 Vitals BP Pulse Temp Resp Height(growth percentile) Weight(growth percentile) 126/67 69 97.8 °F (36.6 °C) (Oral) 18 5' 8\" (1.727 m) 160 lb (72.6 kg) SpO2 BMI Smoking Status 98% 24.33 kg/m2 Never Smoker BMI and BSA Data Body Mass Index Body Surface Area  
 24.33 kg/m 2 1.87 m 2 Preferred Pharmacy Pharmacy Name Phone [facility]  [phone] Your Updated Medication List  
  
   
This list is accurate as of: 10/5/17 11:50 AM.  Always use your most recent med list.  
  
  
  
  
 metoprolol succinate 50 mg XL tablet Commonly known as:  TOPROL-XL  
TAKE ONE TABLET BY MOUTH ONCE DAILY RHINOCORT AQUA 32 mcg/actuation nasal spray Generic drug:  budesonide 2 Sprays by Both Nostrils route daily. We Performed the Following AMB POC XRAY, SPINE, LUMBOSACRAL; 4+ [id number] CPT(R)] Follow-up Instructions Return for after injections. Patient Instructions [email or web address] Activation Thank you for requesting access to Gravitant. Please follow the instructions below to securely access and download your online medical record. Gravitant allows you to send messages to your doctor, view your test results, renew your prescriptions, schedule appointments, and more. How Do I Sign Up? 1. In your internet browser, go to [email or web address] 
2. Click on the First Time User? Click Here link in the Sign In box. You will be redirect to the New Member Sign Up page. 3. Enter your Gravitant Access Code exactly as it appears below. You will not need to use this code after youve completed the sign-up process. If you do not sign up before the expiration date, you must request a new code. Gravitant Access Code: [email or web address] Expires: 2017  1:43 PM (This is the date your Gravitant access code will ) 4. Enter the last four digits of your Social Security Number (xxxx) and Date of Birth (mm/dd/yyyy) as indicated and click Submit. You will be taken to the next sign-up page. 5. Create a Flywheelt ID. This will be your Krikle login ID and cannot be changed, so think of one that is secure and easy to remember. 6. Create a Krikle password. You can change your password at any time. 7. Enter your Password Reset Question and Answer. This can be used at a later time if you forget your password. 8. Enter your e-mail address. You will receive e-mail notification when new information is available in [address] 9. Click Sign Up. You can now view and download portions of your medical record. 10. Click the Download Summary menu link to download a portable copy of your medical information. Additional Information If you have questions, please visit the Frequently Asked Questions section of the Krikle website at [email or web address], [email or web address] MSA Management/"Qnect, llc"/. Remember, Krikle is NOT to be used for urgent needs. For medical emergencies, dial 911. Lumbar Spinal Stenosis: Care Instructions Your Care Instructions Stenosis in the spine is a narrowing of the canal that is around the spinal cord and nerve roots in your back. It can happen as part of aging. Sometimes bone and other tissue grow into this canal and press on the nerves that branch out from the spinal cord. This can cause pain, numbness, and weakness. When it happens in the lower part of your back, it is called lumbar spinal stenosis. It can cause problems in the legs, feet, and rear end (buttocks). You may be able to get relief from the symptoms of spinal stenosis by taking pain medicine. Your doctor may suggest physical therapy and exercises to keep your spine strong and flexible. Some people try steroid shots to reduce swelling. If pain and numbness in your legs are still so bad that you cannot do your normal activities, you may need surgery. Follow-up care is a key part of your treatment and safety.  Be sure to make and go to all appointments, and call your doctor if you are having problems. It's also a good idea to know your test results and keep a list of the medicines you take. How can you care for yourself at home? · Take an over-the-counter pain medicine, such as acetaminophen (Tylenol), ibuprofen (Advil, Motrin), or naproxen (Aleve). Be safe with medicines. Read and follow all instructions on the label. · Do not take two or more pain medicines at the same time unless the doctor told you to. Many pain medicines have acetaminophen, which is Tylenol. Too much acetaminophen (Tylenol) can be harmful. · Stay at a healthy weight. Being overweight puts extra strain on your spine. · Change positions often when you sit or stand. This can ease pain. It may also reduce pressure on the spinal cord and its nerves. · Avoid doing things that make your symptoms worse. Walking downhill and standing for a long time may cause pain. · Stretch and strengthen your back muscles as your doctor or physical therapist recommends. If your doctor says it is okay to do them, these exercises may help. ¨ Lie on your back with your knees bent. Gently pull one bent knee to your chest. Put that foot back on the floor, and then pull the other knee to your chest. 
¨ Do pelvic tilts. Lie on your back with your knees bent. Tighten your stomach muscles. Pull your belly button (navel) in and up toward your ribs. You should feel like your back is pressing to the floor and your hips and pelvis are slightly lifting off the floor. Hold for 6 seconds while breathing smoothly. ¨ Stand with your back flat against a wall. Slowly slide down until your knees are slightly bent. Hold for 10 seconds, then slide back up the wall. · Remove or change anything in your house that may cause you to fall. Keep walkways clear of clutter, electrical cords, and throw rugs. When should you call for help? Call 911 anytime you think you may need emergency care. For example, call if: 
· You are unable to move a leg at all. Call your doctor now or seek immediate medical care if: 
· You have new or worse symptoms in your legs, belly, or buttocks. Symptoms may include: ¨ Numbness or tingling. ¨ Weakness. ¨ Pain. · You lose bladder or bowel control. Watch closely for changes in your health, and be sure to contact your doctor if: 
· You are not getting better as expected. Where can you learn more? Go to http://kerline-bernabe.info/. Sheela Garland in the search box to learn more about \"Lumbar Spinal Stenosis: Care Instructions. \" Current as of: March 21, 2017 Content Version: 11.3 © 2519-5518 Actacell. Care instructions adapted under license by Cambly (which disclaims liability or warranty for this information). If you have questions about a medical condition or this instruction, always ask your healthcare professional. [address] any warranty or liability for your use of this information. Introducing [facility] & HEALTH SERVICES! [facility] introduces [employer] patient portal. Now you can access parts of your medical record, email your doctor's office, and request medication refills online. 1. In your internet browser, go to [email or web address] 2. Click on the First Time User? Click Here link in the Sign In box. You will see the New Member Sign Up page. 3. Enter your [employer] Access Code exactly as it appears below. You will not need to use this code after youve completed the sign-up process. If you do not sign up before the expiration date, you must request a new code. · [employer] Access Code: [email or web address] Expires: 11/16/2017  1:43 PM 
 
4. Enter the last four digits of your Social Security Number (xxxx) and Date of Birth (mm/dd/yyyy) as indicated and click Submit. You will be taken to the next sign-up page. 5. Create a [employer]ion [employer] ID.  This will be your [employer] login ID and cannot be changed, so think of one that is secure and easy to remember. 6. Create a ividence password. You can change your password at any time. 7. Enter your Password Reset Question and Answer. This can be used at a later time if you forget your password. 8. Enter your e-mail address. You will receive e-mail notification when new information is available in [address] 9. Click Sign Up. You can now view and download portions of your medical record. 10. Click the Download Summary menu link to download a portable copy of your medical information. If you have questions, please visit the Frequently Asked Questions section of the ividence website. Remember, ividence is NOT to be used for urgent needs. For medical emergencies, dial 911. Now available from your iPhone and Android! Please provide this summary of care documentation to your next provider. Your primary care clinician is listed as JOHAN NUGENT. If you have any questions after today's visit, please call [phone].

## 2017-10-05 NOTE — PATIENT INSTRUCTIONS
[employer] Activation    Thank you for requesting access to [employer]. Please follow the instructions below to securely access and download your online medical record. [employer] allows you to send messages to your doctor, view your test results, renew your prescriptions, schedule appointments, and more. How Do I Sign Up? 1. In your internet browser, go to [email or web address]  2. Click on the First Time User? Click Here link in the Sign In box. You will be redirect to the New Member Sign Up page. 3. Enter your [employer] Access Code exactly as it appears below. You will not need to use this code after youve completed the sign-up process. If you do not sign up before the expiration date, you must request a new code. [employer] Access Code: [email or web address]  Expires: 2017  1:43 PM (This is the date your [employer] access code will )    4. Enter the last four digits of your Social Security Number (xxxx) and Date of Birth (mm/dd/yyyy) as indicated and click Submit. You will be taken to the next sign-up page. 5. Create a [employer] ID. This will be your [employer] login ID and cannot be changed, so think of one that is secure and easy to remember. 6. Create a [employer] password. You can change your password at any time. 7. Enter your Password Reset Question and Answer. This can be used at a later time if you forget your password. 8. Enter your e-mail address. You will receive e-mail notification when new information is available in [address] 9. Click Sign Up. You can now view and download portions of your medical record. 10. Click the Download Summary menu link to download a portable copy of your medical information. Additional Information    If you have questions, please visit the Frequently Asked Questions section of the [employer] website at [email or web address] Remember, [employer] is NOT to be used for urgent needs. For medical emergencies, dial 911.          Lumbar Spinal Stenosis: Care Instructions  Your Care Instructions    Stenosis in the spine is a narrowing of the canal that is around the spinal cord and nerve roots in your back. It can happen as part of aging. Sometimes bone and other tissue grow into this canal and press on the nerves that branch out from the spinal cord. This can cause pain, numbness, and weakness. When it happens in the lower part of your back, it is called lumbar spinal stenosis. It can cause problems in the legs, feet, and rear end (buttocks). You may be able to get relief from the symptoms of spinal stenosis by taking pain medicine. Your doctor may suggest physical therapy and exercises to keep your spine strong and flexible. Some people try steroid shots to reduce swelling. If pain and numbness in your legs are still so bad that you cannot do your normal activities, you may need surgery. Follow-up care is a key part of your treatment and safety. Be sure to make and go to all appointments, and call your doctor if you are having problems. It's also a good idea to know your test results and keep a list of the medicines you take. How can you care for yourself at home? · Take an over-the-counter pain medicine, such as acetaminophen (Tylenol), ibuprofen (Advil, Motrin), or naproxen (Aleve). Be safe with medicines. Read and follow all instructions on the label. · Do not take two or more pain medicines at the same time unless the doctor told you to. Many pain medicines have acetaminophen, which is Tylenol. Too much acetaminophen (Tylenol) can be harmful. · Stay at a healthy weight. Being overweight puts extra strain on your spine. · Change positions often when you sit or stand. This can ease pain. It may also reduce pressure on the spinal cord and its nerves. · Avoid doing things that make your symptoms worse. Walking downhill and standing for a long time may cause pain. · Stretch and strengthen your back muscles as your doctor or physical therapist recommends.  If your doctor says it is okay to do them, these exercises may help. ¨ Lie on your back with your knees bent. Gently pull one bent knee to your chest. Put that foot back on the floor, and then pull the other knee to your chest.  ¨ Do pelvic tilts. Lie on your back with your knees bent. Tighten your stomach muscles. Pull your belly button (navel) in and up toward your ribs. You should feel like your back is pressing to the floor and your hips and pelvis are slightly lifting off the floor. Hold for 6 seconds while breathing smoothly. ¨ Stand with your back flat against a wall. Slowly slide down until your knees are slightly bent. Hold for 10 seconds, then slide back up the wall. · Remove or change anything in your house that may cause you to fall. Keep walkways clear of clutter, electrical cords, and throw rugs. When should you call for help? Call 911 anytime you think you may need emergency care. For example, call if:  · You are unable to move a leg at all. Call your doctor now or seek immediate medical care if:  · You have new or worse symptoms in your legs, belly, or buttocks. Symptoms may include:  ¨ Numbness or tingling. ¨ Weakness. ¨ Pain. · You lose bladder or bowel control. Watch closely for changes in your health, and be sure to contact your doctor if:  · You are not getting better as expected. Where can you learn more? Go to http://kerline-bernabe.info/. Dow Sandhoff in the search box to learn more about \"Lumbar Spinal Stenosis: Care Instructions. \"  Current as of: March 21, 2017  Content Version: 11.3  © 0664-5852 Saygus. Care instructions adapted under license by baseclick (which disclaims liability or warranty for this information). If you have questions about a medical condition or this instruction, always ask your healthcare professional. [address] any warranty or liability for your use of this information.

## 2017-10-05 NOTE — PROGRESS NOTES
Chase Mccormick [facility] [address].  [address], [address]  [place], [address] [place] [address] Street  Phone: [phone]  Fax: [phone]        Nata Farah  : 1933  PCP: Vladimir Winkler MD      NEW PATIENT      ASSESSMENT AND PLAN     Diagnoses and all orders for this visit:    1. Spinal stenosis at L4-L5 level  -     [40635] LS Spine 4V    2. Spondylolisthesis at L4-L5 level    Other orders  -     SCHEDULE SURGERY    1. Advised to stay active as tolerated. 2. Set up for LUISA at L4-5  3. Continue PRN Motrin. 4. Given information on stenosis. Follow-up Disposition:  Return for after injections. CHIEF COMPLAINT  Alexi Alarcon is seen today in consultation at the request of Dr. Donald Guallpa for complaints of back pain       HISTORY OF PRESENT ILLNESS  Alexi Alarcon is a 80 y.o. male. Pt last evaluated in 2014 and has known stenosis at L4-5. Pt is an avid golfer. Today pt c/o back pain of 6 months duration. Pt denies any specific incident or injury that caused their pain. Pt continues to golf, his golf game is okay but his walking is getting worse. He has a very short walking tolerance now. He states that he has difficulty walking across his room in the morning due to pain and morning stiffness. He stretches before he gets out of bed every morning. Pt states that he also has paresthesias in his back into his legs that is unable to be relieved. He states that this is triggered with walking, happens quickly. He has stiffness in his back with playing golf, denies much pain with golfing. He has some night sweats, not often. He has started a new weight loss program, has lost 20 lbs. Pt has not noticed weakness in his BLE. Pt denies saddle paresthesias. Pt states he has been using  Motrin with food with  relief. He usually takes 1-2 tabs a day of OTC Motrin (no more than 200 mg) but wishes to come off of this. Pt has not been through physical therapy recent.  Has not had any recent xray films of his back. He has somnolence and depressed mood with Tylenol. Denies persistent fevers, chills, weight changes, neurogenic bowel or bladder symptoms. Pt denies recent ED visits or hospitalizations. PMHx of pace maker placement. He will be leaving next month for a golf tournament. Wants repeat LUISA. Pain Assessment  10/5/2017   Location of Pain Back;Leg   Severity of Pain 5   Quality of Pain Burning;Aching   Duration of Pain Persistent   Frequency of Pain Constant   Aggravating Factors Stairs; Walking;Standing;Squatting   Relieving Factors Other (Comment)   Relieving Factors Comment ibuprofen   Result of Injury No         PAST MEDICAL HISTORY   Past Medical History:   Diagnosis Date    Atrial fibrillation     paroxysmal   CHADS score 2  (-CHF, +HTN, +AGE, -DM, -CVA)    Back pain     Dyslipidemia     GERD     Hypertrension     Nephrolithiasis     Pacemaker     11/01,   9/09    MEDTRONIC    Sick sinus syndrome        Past Surgical History:   Procedure Laterality Date    CARDIAC SURG PROCEDURE UNLIST      Pacemaker    HX APPENDECTOMY      in his early 19's    HX CATARACT REMOVAL      1/07  bilateral       MEDICATIONS    Current Outpatient Prescriptions   Medication Sig Dispense Refill    metoprolol succinate (TOPROL-XL) 50 mg XL tablet TAKE ONE TABLET BY MOUTH ONCE DAILY 30 Tab 11    budesonide (RHINOCORT AQUA) 32 mcg/actuation nasal spray 2 Sprays by Both Nostrils route daily. ALLERGIES  Allergies   Allergen Reactions    Lisinopril Other (comments)     Irregular heart rate    Other Plant, Animal, Environmental Other (comments)     Grass, dust, and maple trees causes congestion          SOCIAL HISTORY    Social History     Social History    Marital status:      Spouse name: N/A    Number of children: N/A    Years of education: N/A     Occupational History    Not on file.      Social History Main Topics    Smoking status: Never Smoker    Smokeless tobacco: Never Used    Alcohol use 2.5 oz/week     5 Glasses of wine per week    Drug use: No    Sexual activity: Not on file     Other Topics Concern    Not on file     Social History Narrative       FAMILY HISTORY  Family History   Problem Relation Age of Onset    Other Father 39      from intestinal blockage in [de-identified]    Stroke Mother 68         REVIEW OF SYSTEMS  Review of Systems   Constitutional: Negative for chills, fever and weight loss. Respiratory: Negative for shortness of breath. Cardiovascular: Negative for chest pain. Gastrointestinal: Negative for constipation. Negative for fecal incontinence   Genitourinary: Negative for dysuria. Negative for urinary incontinence   Musculoskeletal:        Per HPI   Skin: Negative for rash. Neurological: Positive for tingling. Negative for dizziness, tremors, focal weakness and headaches. Endo/Heme/Allergies: Bruises/bleeds easily. Psychiatric/Behavioral: The patient does not have insomnia. PHYSICAL EXAMINATION  Visit Vitals    /67    Pulse 69    Temp 97.8 °F (36.6 °C) (Oral)    Resp 18    Ht 5' 8\" (1.727 m)    Wt 160 lb (72.6 kg)    SpO2 98%    BMI 24.33 kg/m2          Accompanied by self. Constitutional:  Well developed, well nourished, in no acute distress. Psychiatric: Affect and mood are appropriate. Integumentary: No rashes or abrasions noted on exposed areas. Cardiovascular/Peripheral Vascular: Intact l pulses. No peripheral edema is noted. Lymphatic:  No evidence of lymphedema. No cervical lymphadenopathy. SPINE/MUSCULOSKELETAL EXAM    Lumbar spine:  No rash, ecchymosis, or gross obliquity. No fasciculations. No focal atrophy is noted. Tenderness to palpation midline sacrum. No tenderness to palpation at the sciatic notch. SI joints non-tender. Trochanters non tender. Sensation grossly intact to light touch.       MOTOR:       Hip Flex  Quads Hamstrings Ankle DF EHL Ankle PF   Right +4/5 +4/5 +4/5 +4/5 +4/5 +4/5   Left +4/5 +4/5 +4/5 +4/5 +4/5 +4/5     DTRs are 1+  patella, and Achilles. Straight Leg raise negative. Ambulation without assistive device. FWB. RADIOGRAPHS  Lumbar spine xray films reviewed:  1) disc space narrowing at L5-S1  2) Slight listhesis at L4-5  3) Degenerative changes. 4) Slight motion at L4-5 with flexion. Written by Marcy Jin, as dictated by Eunice Cox MD.    I, Dr. Eunice Cox MD, confirm that all documentation is accurate. Mr. Harjinder York may have a reminder for a \"due or due soon\" health maintenance. I have asked that he contact his primary care provider for follow-up on this health maintenance.

## 2017-10-16 ENCOUNTER — TELEPHONE (OUTPATIENT)
Dept: ORTHOPEDIC SURGERY | Age: 82
End: 2017-10-16

## 2017-10-31 ENCOUNTER — APPOINTMENT (OUTPATIENT)
Dept: GENERAL RADIOLOGY | Age: 82
End: 2017-10-31
Attending: PHYSICAL MEDICINE & REHABILITATION
Payer: MEDICARE

## 2017-10-31 ENCOUNTER — HOSPITAL ENCOUNTER (OUTPATIENT)
Age: 82
Setting detail: OUTPATIENT SURGERY
Discharge: HOME | End: 2017-10-31
Attending: PHYSICAL MEDICINE & REHABILITATION | Admitting: PHYSICAL MEDICINE & REHABILITATION
Payer: MEDICARE

## 2017-10-31 VITALS
SYSTOLIC BLOOD PRESSURE: 135 MMHG | BODY MASS INDEX: 24.25 KG/M2 | HEART RATE: 83 BPM | RESPIRATION RATE: 18 BRPM | OXYGEN SATURATION: 98 % | HEIGHT: 68 IN | WEIGHT: 160 LBS | DIASTOLIC BLOOD PRESSURE: 80 MMHG | TEMPERATURE: 97.8 F

## 2017-10-31 PROCEDURE — 74011250636 HC RX REV CODE- 250/636: Performed by: PHYSICAL MEDICINE & REHABILITATION

## 2017-10-31 PROCEDURE — 77030014124 HC TY EPDRL BBMI -A: Performed by: PHYSICAL MEDICINE & REHABILITATION

## 2017-10-31 PROCEDURE — 74011636320 HC RX REV CODE- 636/320: Performed by: PHYSICAL MEDICINE & REHABILITATION

## 2017-10-31 PROCEDURE — 74011250636 HC RX REV CODE- 250/636

## 2017-10-31 PROCEDURE — 74011000250 HC RX REV CODE- 250

## 2017-10-31 PROCEDURE — 76010000009 HC PAIN MGT 0 TO 30 MIN PROC: Performed by: PHYSICAL MEDICINE & REHABILITATION

## 2017-10-31 PROCEDURE — 74011636320 HC RX REV CODE- 636/320

## 2017-10-31 PROCEDURE — 77030003543 HC NDL EPDRL BBMI -A: Performed by: PHYSICAL MEDICINE & REHABILITATION

## 2017-10-31 PROCEDURE — 74011000250 HC RX REV CODE- 250: Performed by: PHYSICAL MEDICINE & REHABILITATION

## 2017-10-31 RX ORDER — DEXAMETHASONE SODIUM PHOSPHATE 100 MG/10ML
INJECTION INTRAMUSCULAR; INTRAVENOUS AS NEEDED
Status: DISCONTINUED | OUTPATIENT
Start: 2017-10-31 | End: 2017-10-31 | Stop reason: HOSPADM

## 2017-10-31 RX ORDER — DIAZEPAM 5 MG/1
2.5-1 TABLET ORAL ONCE
Status: DISCONTINUED | OUTPATIENT
Start: 2017-10-31 | End: 2017-10-31 | Stop reason: HOSPADM

## 2017-10-31 RX ORDER — LIDOCAINE HYDROCHLORIDE 10 MG/ML
INJECTION, SOLUTION EPIDURAL; INFILTRATION; INTRACAUDAL; PERINEURAL AS NEEDED
Status: DISCONTINUED | OUTPATIENT
Start: 2017-10-31 | End: 2017-10-31 | Stop reason: HOSPADM

## 2017-10-31 NOTE — H&P (VIEW-ONLY)
[facility] [address].  [address], [address]  [place], [address] [place] [address] Street  Phone: [phone]  Fax: [phone]        Giulia Petersen  : 1933  PCP: Anni Peñaloza MD      NEW PATIENT      ASSESSMENT AND PLAN     Diagnoses and all orders for this visit:    1. Spinal stenosis at L4-L5 level  -     [10739] LS Spine 4V    2. Spondylolisthesis at L4-L5 level    Other orders  -     SCHEDULE SURGERY    1. Advised to stay active as tolerated. 2. Set up for LUISA at L4-5  3. Continue PRN Motrin. 4. Given information on stenosis. Follow-up Disposition:  Return for after injections. CHIEF COMPLAINT  Maile Au is seen today in consultation at the request of Dr. Lee De La Cruz for complaints of back pain       HISTORY OF PRESENT ILLNESS  Maile Au is a 80 y.o. male. Pt last evaluated in 2014 and has known stenosis at L4-5. Pt is an avid golfer. Today pt c/o back pain of 6 months duration. Pt denies any specific incident or injury that caused their pain. Pt continues to golf, his golf game is okay but his walking is getting worse. He has a very short walking tolerance now. He states that he has difficulty walking across his room in the morning due to pain and morning stiffness. He stretches before he gets out of bed every morning. Pt states that he also has paresthesias in his back into his legs that is unable to be relieved. He states that this is triggered with walking, happens quickly. He has stiffness in his back with playing golf, denies much pain with golfing. He has some night sweats, not often. He has started a new weight loss program, has lost 20 lbs. Pt has not noticed weakness in his BLE. Pt denies saddle paresthesias. Pt states he has been using  Motrin with food with  relief. He usually takes 1-2 tabs a day of OTC Motrin (no more than 200 mg) but wishes to come off of this. Pt has not been through physical therapy recent.  Has not had any recent xray films of his back. He has somnolence and depressed mood with Tylenol. Denies persistent fevers, chills, weight changes, neurogenic bowel or bladder symptoms. Pt denies recent ED visits or hospitalizations. PMHx of pace maker placement. He will be leaving next month for a golf tournament. Wants repeat LUISA. Pain Assessment  10/5/2017   Location of Pain Back;Leg   Severity of Pain 5   Quality of Pain Burning;Aching   Duration of Pain Persistent   Frequency of Pain Constant   Aggravating Factors Stairs; Walking;Standing;Squatting   Relieving Factors Other (Comment)   Relieving Factors Comment ibuprofen   Result of Injury No         PAST MEDICAL HISTORY   Past Medical History:   Diagnosis Date    Atrial fibrillation     paroxysmal   CHADS score 2  (-CHF, +HTN, +AGE, -DM, -CVA)    Back pain     Dyslipidemia     GERD     Hypertrension     Nephrolithiasis     Pacemaker     11/01,   9/09    [employer]TR[employer]IC    Sick sinus syndrome        Past Surgical History:   Procedure Laterality Date    CARDIAC SURG PROCEDURE UNLIST      Pacemaker    HX APPENDECTOMY      in his early 19's    HX CATARACT REMOVAL      1/07  bilateral       MEDICATIONS    Current Outpatient Prescriptions   Medication Sig Dispense Refill    metoprolol succinate (TOPROL-XL) 50 mg XL tablet TAKE ONE TABLET BY MOUTH ONCE DAILY 30 Tab 11    budesonide (RHINOCORT AQUA) 32 mcg/actuation nasal spray 2 Sprays by Both Nostrils route daily. ALLERGIES  Allergies   Allergen Reactions    Lisinopril Other (comments)     Irregular heart rate    Other Plant, Animal, Environmental Other (comments)     Grass, dust, and maple trees causes congestion          SOCIAL HISTORY    Social History     Social History    Marital status:      Spouse name: N/A    Number of children: N/A    Years of education: N/A     Occupational History    Not on file.      Social History Main Topics    Smoking status: Never Smoker    Smokeless tobacco: Never Used    Alcohol use 2.5 oz/week     5 Glasses of wine per week    Drug use: No    Sexual activity: Not on file     Other Topics Concern    Not on file     Social History Narrative       FAMILY HISTORY  Family History   Problem Relation Age of Onset    Other Father 39      from intestinal blockage in [de-identified]    Stroke Mother 68         REVIEW OF SYSTEMS  Review of Systems   Constitutional: Negative for chills, fever and weight loss. Respiratory: Negative for shortness of breath. Cardiovascular: Negative for chest pain. Gastrointestinal: Negative for constipation. Negative for fecal incontinence   Genitourinary: Negative for dysuria. Negative for urinary incontinence   Musculoskeletal:        Per HPI   Skin: Negative for rash. Neurological: Positive for tingling. Negative for dizziness, tremors, focal weakness and headaches. Endo/Heme/Allergies: Bruises/bleeds easily. Psychiatric/Behavioral: The patient does not have insomnia. PHYSICAL EXAMINATION  Visit Vitals    /67    Pulse 69    Temp 97.8 °F (36.6 °C) (Oral)    Resp 18    Ht 5' 8\" (1.727 m)    Wt 160 lb (72.6 kg)    SpO2 98%    BMI 24.33 kg/m2          Accompanied by self. Constitutional:  Well developed, well nourished, in no acute distress. Psychiatric: Affect and mood are appropriate. Integumentary: No rashes or abrasions noted on exposed areas. Cardiovascular/Peripheral Vascular: Intact l pulses. No peripheral edema is noted. Lymphatic:  No evidence of lymphedema. No cervical lymphadenopathy. SPINE/MUSCULOSKELETAL EXAM    Lumbar spine:  No rash, ecchymosis, or gross obliquity. No fasciculations. No focal atrophy is noted. Tenderness to palpation midline sacrum. No tenderness to palpation at the sciatic notch. SI joints non-tender. Trochanters non tender. Sensation grossly intact to light touch.       MOTOR:       Hip Flex  Quads Hamstrings Ankle DF EHL Ankle PF   Right +4/5 +4/5 +4/5 +4/5 +4/5 +4/5   Left +4/5 +4/5 +4/5 +4/5 +4/5 +4/5     DTRs are 1+  patella, and Achilles. Straight Leg raise negative. Ambulation without assistive device. FWB. RADIOGRAPHS  Lumbar spine xray films reviewed:  1) disc space narrowing at L5-S1  2) Slight listhesis at L4-5  3) Degenerative changes. 4) Slight motion at L4-5 with flexion. Written by Kera Gtz, as dictated by Niki Bashir MD.    I, Dr. Niki Bashir MD, confirm that all documentation is accurate. Mr. Todd Kerr may have a reminder for a \"due or due soon\" health maintenance. I have asked that he contact his primary care provider for follow-up on this health maintenance.

## 2017-10-31 NOTE — INTERVAL H&P NOTE
H&P Update:  Matt Lynn was seen and briefly examined. History and physical has been reviewed.  There have been no significant clinical changes since the completion of the originally dated History and Physical.    Signed By: Bradley Villanueva MD     October 31, 2017 2:27 PM

## 2017-10-31 NOTE — DISCHARGE INSTRUCTIONS
[facility]   ([facility])  Dr. Bryan Pacheco, Dr. Alexx Rawls, Dr. Christie Neither not drive a car, operate heavy machinery or dangerous equipment for 24 hours. * Activity as tolerated; rest for the remainder of the day. * Resume pre-block medications including those for your family doctor. * Do not drink alcoholic beverages for 24 hours. Alcohol and the medications you have received may interact and cause an adverse reaction. * You may feel better this evening and worse tomorrow, as the numbing medications wears off and the steroid has yet to begin to work. After 48 hrs the steroid should begin to release bringing you relief. * You may shower this evening and remove any bandages. * Avoid hot tubs and heating pads for 24 hours. You may use cold packs on the procedure site as tolerated for the first 24 hours. * If a headache develops, drink plenty of fluids and rest.  Take over the counter medications for headache if needed. If the headache continues longer than 24 hours, call MD at the [address] [place] [address]. [phone]    * Continue taking pain medications as needed. * You may resume your regular diet if tolerated. Otherwise, start with sips of water and advance slowly. * If Diabetic: check your blood sugar three times a day for the next 3 days. If your sugar is greater than 300 call your family doctor. If your sugar is greater than 400, have someone transport you to the nearest Emergency Room. * If you experience any of the following problems, Please Call the [address] [place] [address] at [phone].         * Shortness of Breath    * Fever of 101 or higher    * Nausea / Vomiting    * Severe Headache    * Weakness or numbness in arms or legs that is not      resolving    * Prolonged increase in pain greater than 4 days      DISCHARGE SUMMARY from Nurse      PATIENT INSTRUCTIONS:    After oral sedation, for 24 hours or while taking prescription Narcotics:  · Limit your activities  · Do not drive and operate hazardous machinery  · Do not make important personal or business decisions  · Do  not drink alcoholic beverages  · If you have not urinated within 8 hours after discharge, please contact your surgeon on call. Report the following to your surgeon:  · Excessive pain, swelling, redness or odor of or around the surgical area  · Temperature over 101  · Nausea and vomiting lasting longer than 4 hours or if unable to take medications  · Any signs of decreased circulation or nerve impairment to extremity: change in color, persistent  numbness, tingling, coldness or increase pain  · Any questions            What to do at Home:  Recommended activity: Activity as tolerated, NO DRIVING FOR 12 Hours post injection          *  Please give a list of your current medications to your Primary Care Provider. *  Please update this list whenever your medications are discontinued, doses are      changed, or new medications (including over-the-counter products) are added. *  Please carry medication information at all times in case of emergency situations. These are general instructions for a healthy lifestyle:    No smoking/ No tobacco products/ Avoid exposure to second hand smoke    Surgeon General's Warning:  Quitting smoking now greatly reduces serious risk to your health. Obesity, smoking, and sedentary lifestyle greatly increases your risk for illness    A healthy diet, regular physical exercise & weight monitoring are important for maintaining a healthy lifestyle    You may be retaining fluid if you have a history of heart failure or if you experience any of the following symptoms:  Weight gain of 3 pounds or more overnight or 5 pounds in a week, increased swelling in our hands or feet or shortness of breath while lying flat in bed.   Please call your doctor as soon as you notice any of these symptoms; do not wait until your next office visit. Recognize signs and symptoms of STROKE:    F-face looks uneven    A-arms unable to move or move unevenly    S-speech slurred or non-existent    T-time-call 911 as soon as signs and symptoms begin-DO NOT go       Back to bed or wait to see if you get better-TIME IS BRAIN.

## 2017-10-31 NOTE — PROCEDURES
Intralaminar Epidural Steroid Procedure Note        Patient Name   Leatha Stewart  Date of Procedure: October 31, 2017  Preoperative Diagnosis: Spinal stenosis of lumbar region without neurogenic claudication [M48.061]  Spondylolisthesis at L4-L5 level [M43.16]  Postoperative Diagnosis: Same  Location [facility], [address]      Procedure:  Epidural Steroid Injection    Consent:  Informed consent was obtained prior to the procedure. In addition to the potential risks associated with the procedure itself, the patient was informed both verbally and in writing of the potential side effects of the use of glucocorticoid. The patient appeared to comprehend the informed consent and desired to have the procedure performed. Procedure in Detail:  The patient was taken to the procedure suite and placed in the prone position on the operating table on appropriate padding. The posterior lumbar region was prepped and draped in the usual sterile fashion. Intraoperative fluoroscopy was used to localize the L4-L5 interspace. The skin was infiltrated with 1% lidocaine. An 18-gauge Tuohy needle was advanced into the epidural space at L4-L5 under fluoroscopic guidance using the loss of resistance technique. No cerebrospinal fluid was seen throughout the procedure. Yes  A small amount of Isovue was injected into the epidural space, confirming appropriated needle placement on fluoroscopy. No vascular uptake was identified. Next, 2ml of 1% Lidocaine and 30mg of preservative free Dexamethasone were injected via the Tuohy needle. The needle was removed from the patient. The patient tolerated the procedure well and was discharged home with designated  and care instructions.       Signed By: Ignacia Painting MD                        October 31, 2017

## 2017-11-20 ENCOUNTER — OFFICE VISIT (OUTPATIENT)
Dept: ORTHOPEDIC SURGERY | Age: 82
End: 2017-11-20

## 2017-11-20 VITALS
HEART RATE: 87 BPM | BODY MASS INDEX: 24.86 KG/M2 | RESPIRATION RATE: 16 BRPM | WEIGHT: 164 LBS | OXYGEN SATURATION: 97 % | HEIGHT: 68 IN | DIASTOLIC BLOOD PRESSURE: 69 MMHG | SYSTOLIC BLOOD PRESSURE: 125 MMHG | TEMPERATURE: 97.8 F

## 2017-11-20 DIAGNOSIS — M48.061 SPINAL STENOSIS AT L4-L5 LEVEL: Primary | ICD-10-CM

## 2017-11-20 DIAGNOSIS — M43.16 SPONDYLOLISTHESIS AT L4-L5 LEVEL: ICD-10-CM

## 2017-11-20 NOTE — PROGRESS NOTES
[facility] [address].  [address], [address]  [facility], [address]  Phone: [phone]  Fax: [phone]        Mando Zelaya  : 1933  PCP: Van Darby MD    PROGRESS NOTE      ASSESSMENT AND PLAN    Diagnoses and all orders for this visit:    1. Spinal stenosis at L4-L5 level    2. Spondylolisthesis at L4-L5 level    1. Advised to continue HEP. 2. Continue PRN Motrin. 3. Urgent symptoms discussed with pt. Follow-up Disposition:  Return if symptoms worsen or fail to improve. HISTORY OF PRESENT ILLNESS  Juliana Low is a 80 y.o. male. Pt presents to the office for a f/u visit for stenosis. Pt underwent L4-5 LUISA three weeks ago (10/31/17). He states that he has had good relief from his injection. Took about 10 days. No difficulty post-injection. Pt notes that he does still have his pain with prolonged activity. He has good and bad days with his pain. He continues to golf, worked at [employer] and did well with this. He is back to walking 2 miles a day. He usually feels his pain if he rises from a seated position too quickly or in the morning. Pt reports pain does not radiate into his BLE. Pt denies weakness in his BLE. Pt denies saddle paresthesias. Pt states he has been using Motrin PRN with some relief. Denies persistent fevers, chills, weight changes, neurogenic bowel or bladder symptoms. Pt denies recent ED visits or hospitalizations. PMHx of pace maker placement.     Pain Assessment  2017   Location of Pain Back   Severity of Pain 3   Quality of Pain Aching   Duration of Pain -   Frequency of Pain Constant   Aggravating Factors (No Data)   Aggravating Factors Comment ongoing, lifting   Relieving Factors NSAID   Relieving Factors Comment -   Result of Injury -       PAST MEDICAL HISTORY   Past Medical History:   Diagnosis Date    Atrial fibrillation     paroxysmal   CHADS score 2  (-CHF, +HTN, +AGE, -DM, -CVA)    Back pain     Dyslipidemia     GERD     Hypertrension     Nephrolithiasis     Pacemaker     ,       [employer]    Sick sinus syndrome        Past Surgical History:   Procedure Laterality Date    CARDIAC SURG PROCEDURE UNLIST      Pacemaker    HX APPENDECTOMY      in his early     HX CATARACT REMOVAL        bilateral   .      MEDICATIONS    Current Outpatient Prescriptions   Medication Sig Dispense Refill    metoprolol succinate (TOPROL-XL) 50 mg XL tablet TAKE ONE TABLET BY MOUTH ONCE DAILY 30 Tab 11    budesonide (RHINOCORT AQUA) 32 mcg/actuation nasal spray 2 Sprays by Both Nostrils route daily. ALLERGIES  Allergies   Allergen Reactions    Lisinopril Other (comments)     Irregular heart rate    Other Plant, Animal, Environmental Other (comments)     Grass, dust, and maple trees causes congestion          SOCIAL HISTORY    Social History     Social History    Marital status:      Spouse name: N/A    Number of children: N/A    Years of education: N/A     Occupational History    Not on file. Social History Main Topics    Smoking status: Never Smoker    Smokeless tobacco: Never Used    Alcohol use 2.5 oz/week     5 Glasses of wine per week    Drug use: No    Sexual activity: Not on file     Other Topics Concern    Not on file     Social History Narrative       FAMILY HISTORY  Family History   Problem Relation Age of Onset    Other Father 39      from intestinal blockage in [de-identified]    Stroke Mother 68       REVIEW OF SYSTEMS  Review of Systems   Constitutional: Negative for chills, fever and weight loss. Respiratory: Negative for shortness of breath. Cardiovascular: Negative for chest pain. Gastrointestinal: Negative for constipation. Negative for fecal incontinence   Genitourinary: Negative for dysuria. Negative for urinary incontinence   Musculoskeletal:        Per HPI   Skin: Negative for rash.    Neurological: Negative for dizziness, tingling, tremors, focal weakness and headaches. Endo/Heme/Allergies: Does not bruise/bleed easily. Psychiatric/Behavioral: The patient does not have insomnia. PHYSICAL EXAMINATION  Visit Vitals    /69    Pulse 87    Temp 97.8 °F (36.6 °C) (Oral)    Resp 16    Ht 5' 8\" (1.727 m)    Wt 164 lb (74.4 kg)    SpO2 97%    BMI 24.94 kg/m2         Accompanied by self. Constitutional:  Well developed, well nourished, in no acute distress. Psychiatric: Affect and mood are appropriate. Integumentary: No rashes or abrasions noted on exposed areas. Cardiovascular/Peripheral Vascular: Intact l pulses. No peripheral edema is noted. Lymphatic:  No evidence of lymphedema. No cervical lymphadenopathy. SPINE/MUSCULOSKELETAL EXAM    Lumbar spine:  No rash, ecchymosis, or gross obliquity. No fasciculations. No focal atrophy is noted. No tenderness to palpation at the sciatic notch. SI joints non-tender. Trochanters non tender. Sensation grossly intact to light touch. MOTOR:       Hip Flex  Quads Hamstrings Ankle DF EHL Ankle PF   Right +4/5 +4/5 +4/5 +4/5 +4/5 +4/5   Left +4/5 +4/5 +4/5 +4/5 +4/5 +4/5       Straight Leg raise negative. Ambulation without assistive device. FWB. Written by Anais Talavera, as dictated by Elizabeth Salgado MD.    I, Dr. Elizabeth Salgado MD, confirm that all documentation is accurate. Mr. Princess Arellano may have a reminder for a \"due or due soon\" health maintenance. I have asked that he contact his primary care provider for follow-up on this health maintenance.

## 2018-01-16 ENCOUNTER — TELEPHONE (OUTPATIENT)
Dept: CARDIOLOGY CLINIC | Age: 83
End: 2018-01-16

## 2018-02-09 ENCOUNTER — HOSPITAL ENCOUNTER (OUTPATIENT)
Dept: LAB | Age: 83
Discharge: HOME | End: 2018-02-09
Payer: MEDICARE

## 2018-02-09 DIAGNOSIS — I11.9 BENIGN HYPERTENSIVE HEART DISEASE WITHOUT HEART FAILURE: ICD-10-CM

## 2018-02-09 LAB
ALBUMIN SERPL-MCNC: 3.9 G/DL (ref 3.4–5)
ALBUMIN/GLOB SERPL: 1.1 {RATIO} (ref 0.8–1.7)
ALP SERPL-CCNC: 71 U/L (ref 45–117)
ALT SERPL-CCNC: 16 U/L (ref 16–61)
ANION GAP SERPL CALC-SCNC: 7 MMOL/L (ref 3–18)
AST SERPL-CCNC: 19 U/L (ref 15–37)
BILIRUB SERPL-MCNC: 1 MG/DL (ref 0.2–1)
BUN SERPL-MCNC: 19 MG/DL (ref 7–18)
BUN/CREAT SERPL: 19 (ref 12–20)
CALCIUM SERPL-MCNC: 9.1 MG/DL (ref 8.5–10.1)
CHLORIDE SERPL-SCNC: 103 MMOL/L (ref 100–108)
CO2 SERPL-SCNC: 29 MMOL/L (ref 21–32)
CREAT SERPL-MCNC: 1.01 MG/DL (ref 0.6–1.3)
GLOBULIN SER CALC-MCNC: 3.5 G/DL (ref 2–4)
GLUCOSE SERPL-MCNC: 89 MG/DL (ref 74–99)
POTASSIUM SERPL-SCNC: 4.2 MMOL/L (ref 3.5–5.5)
PROT SERPL-MCNC: 7.4 G/DL (ref 6.4–8.2)
SODIUM SERPL-SCNC: 139 MMOL/L (ref 136–145)

## 2018-02-09 PROCEDURE — 36415 COLL VENOUS BLD VENIPUNCTURE: CPT | Performed by: INTERNAL MEDICINE

## 2018-02-09 PROCEDURE — 80053 COMPREHEN METABOLIC PANEL: CPT | Performed by: INTERNAL MEDICINE

## 2018-02-21 ENCOUNTER — OFFICE VISIT (OUTPATIENT)
Dept: FAMILY MEDICINE CLINIC | Age: 83
End: 2018-02-21

## 2018-02-21 VITALS
HEIGHT: 68 IN | HEART RATE: 74 BPM | DIASTOLIC BLOOD PRESSURE: 72 MMHG | SYSTOLIC BLOOD PRESSURE: 137 MMHG | BODY MASS INDEX: 24.71 KG/M2 | OXYGEN SATURATION: 98 % | TEMPERATURE: 98.7 F | WEIGHT: 163 LBS | RESPIRATION RATE: 18 BRPM

## 2018-02-21 DIAGNOSIS — I11.9 BENIGN HYPERTENSIVE HEART DISEASE WITHOUT HEART FAILURE: Primary | ICD-10-CM

## 2018-02-21 DIAGNOSIS — I48.0 PAROXYSMAL ATRIAL FIBRILLATION (HCC): ICD-10-CM

## 2018-02-21 NOTE — PROGRESS NOTES
Patient is in the office today for follow up. 1. Have you been to the ER, urgent care clinic since your last visit? Hospitalized since your last visit? No    2. Have you seen or consulted any other health care providers outside of the [address] [place] [address] since your last visit?   Include any pap smears or colon screening. yes, Dr Lilia Lu Cardiology

## 2018-02-21 NOTE — PROGRESS NOTES
Subjective:       Chief Complaint  The patient presents for follow up of hypertension and high cholesterol. PATRIC Portillo is a 80 y.o. male seen for follow up of hyperlipidemia. Brian has hypertension. Hypertension well controlled, no significant medication side effects noted, on Toprol, hyperlipidemia fairly well controlled at age 80. Pt is followed by cardiology. Allergies are stable on Zyrtec and Nasacort prn and allergy shots. Diet and Lifestyle: generally follows a low fat low cholesterol diet, exercises regularly    Home BP Monitoring: is not measured at home. Other symptoms and concerns: pt's back pain is much improved with doing core exercises daily. Pt is followed by cardiology for his afib        Current Outpatient Prescriptions   Medication Sig    metoprolol succinate (TOPROL-XL) 50 mg XL tablet TAKE ONE TABLET BY MOUTH ONCE DAILY    budesonide (RHINOCORT AQUA) 32 mcg/actuation nasal spray 2 Sprays by Both Nostrils route daily. No current facility-administered medications for this visit.               Review of Systems  Respiratory: negative for dyspnea on exertion  Cardiovascular: negative for chest pain    Objective:     Visit Vitals    /72 (BP 1 Location: Left arm, BP Patient Position: Sitting)    Pulse 74    Temp 98.7 °F (37.1 °C) (Oral)    Resp 18    Ht 5' 8\" (1.727 m)    Wt 163 lb (73.9 kg)    SpO2 98%    BMI 24.78 kg/m2        Eyes - pupils equal and reactive, extraocular eye movements intact  Mouth - mucous membranes moist, pharynx normal without lesions  Neck - supple, no significant adenopathy, carotids upstroke normal bilaterally, no bruits  Chest - clear to auscultation, no wheezes, rales or rhonchi, symmetric air entry  Heart - normal rate, regular rhythm, normal S1, S2, 3/5 systolic murmur at RSB  Extremities - peripheral pulses normal, no pedal edema, no clubbing or cyanosis      Labs:   Lab Results   Component Value Date/Time    Cholesterol, total 147 08/07/2017 07:49 AM    HDL Cholesterol 59 08/07/2017 07:49 AM    LDL, calculated 76.4 08/07/2017 07:49 AM    Triglyceride 58 08/07/2017 07:49 AM    CHOL/HDL Ratio 2.5 08/07/2017 07:49 AM     Lab Results   Component Value Date/Time    ALT (SGPT) 16 02/09/2018 08:18 AM    AST (SGOT) 19 02/09/2018 08:18 AM    Alk. phosphatase 71 02/09/2018 08:18 AM    Bilirubin, total 1.0 02/09/2018 08:18 AM     Lab Results   Component Value Date/Time    GFR est AA >60 02/09/2018 08:18 AM    GFR est non-AA >60 02/09/2018 08:18 AM    Creatinine 1.01 02/09/2018 08:18 AM    BUN 19 (H) 02/09/2018 08:18 AM    Sodium 139 02/09/2018 08:18 AM    Potassium 4.2 02/09/2018 08:18 AM    Chloride 103 02/09/2018 08:18 AM    CO2 29 02/09/2018 08:18 AM              Assessment / Plan     Hypertension well controlled, on Toprol  Hyperlipidemia fairly well controlled with diet       ICD-10-CM ICD-9-CM    1. Hypertrension C24.9 756.91 METABOLIC PANEL, COMPREHENSIVE   2. Atrial fibrillation I48.0 427.31 Stable on Toprol and with pacemaker. Pt is followed by Cardiology. Dr Jose C Muñiz cholesterol diet, weight control and daily exercise discussed. Follow-up Disposition:  Return in about 6 months (around 8/21/2018) for OV, and Medicare Wellness Visit, labs 1 week before. Reviewed plan of care. Patient has provided input and agrees with goals.

## 2018-02-21 NOTE — MR AVS SNAPSHOT
Leighton Malay 
 
 
 [address] [address] [id number] [address] 
[phone] Patient: Ed Warner MRN: [id number] [facility]:[birth year] Visit Information Date & Time Provider Department Dept. Phone Encounter #  
 2/21/2018  4:00 PM Nikhil Dee, [address] [place] [address] [phone] [id number] Follow-up Instructions Return in about 6 months (around 8/21/2018) for OV, and Medicare Wellness Visit, labs 1 week before. Upcoming Health Maintenance Date Due DTaP/Tdap/Td series (1 - Tdap) 2/27/1954 ZOSTER VACCINE AGE 60> 12/27/1992 GLAUCOMA SCREENING Q2Y 2/27/1998 Pneumococcal 65+ Low/Medium Risk (1 of 2 - PCV13) 2/27/1998 Influenza Age 5 to Adult 8/1/2017 MEDICARE YEARLY EXAM 2/22/2018 Allergies as of 2/21/2018  Review Complete On: 2/21/2018 By: Nikhil Dee MD  
  
 Severity Noted Reaction Type Reactions Lisinopril    Other (comments) Irregular heart rate Other Plant, Animal, Environmental Low 10/05/2017    Other (comments) Grass, dust, and maple trees causes congestion Current Immunizations  Never Reviewed No immunizations on file. Not reviewed this visit Vitals BP Pulse Temp Resp Height(growth percentile) Weight(growth percentile)  
 137/72 (BP 1 Location: Left arm, BP Patient Position: Sitting) 74 98.7 °F (37.1 °C) (Oral) 18 5' 8\" (1.727 m) 163 lb (73.9 kg) SpO2 BMI Smoking Status 98% 24.78 kg/m2 Never Smoker BMI and BSA Data Body Mass Index Body Surface Area 24.78 kg/m 2 1.88 m 2 Preferred Pharmacy Pharmacy Name Phone [facility] [facility] [phone] Your Updated Medication List  
  
   
This list is accurate as of 2/21/18  4:47 PM.  Always use your most recent med list.  
  
  
  
  
 metoprolol succinate 50 mg XL tablet Commonly known as:  TOPROL-XL  
TAKE ONE TABLET BY MOUTH ONCE DAILY RHINOCORT AQUA 32 mcg/actuation nasal spray Generic drug:  budesonide 2 Sprays by Both Nostrils route daily. Follow-up Instructions Return in about 6 months (around 8/21/2018) for OV, and Medicare Wellness Visit, labs 1 week before. Patient Instructions High Blood Pressure: Care Instructions Your Care Instructions If your blood pressure is usually above 140/90, you have high blood pressure, or hypertension. That means the top number is 140 or higher or the bottom number is 90 or higher, or both. Despite what a lot of people think, high blood pressure usually doesn't cause headaches or make you feel dizzy or lightheaded. It usually has no symptoms. But it does increase your risk for heart attack, stroke, and kidney or eye damage. The higher your blood pressure, the more your risk increases. Your doctor will give you a goal for your blood pressure. Your goal will be based on your health and your age. An example of a goal is to keep your blood pressure below 140/90. Lifestyle changes, such as eating healthy and being active, are always important to help lower blood pressure. You might also take medicine to reach your blood pressure goal. 
Follow-up care is a key part of your treatment and safety. Be sure to make and go to all appointments, and call your doctor if you are having problems. It's also a good idea to know your test results and keep a list of the medicines you take. How can you care for yourself at home? Medical treatment · If you stop taking your medicine, your blood pressure will go back up. You may take one or more types of medicine to lower your blood pressure. Be safe with medicines. Take your medicine exactly as prescribed. Call your doctor if you think you are having a problem with your medicine. · Talk to your doctor before you start taking aspirin every day. Aspirin can help certain people lower their risk of a heart attack or stroke.  But taking aspirin isn't right for everyone, because it can cause serious bleeding. · See your doctor regularly. You may need to see the doctor more often at first or until your blood pressure comes down. · If you are taking blood pressure medicine, talk to your doctor before you take decongestants or anti-inflammatory medicine, such as ibuprofen. Some of these medicines can raise blood pressure. · Learn how to check your blood pressure at home. Lifestyle changes · Stay at a healthy weight. This is especially important if you put on weight around the waist. Losing even 10 pounds can help you lower your blood pressure. · If your doctor recommends it, get more exercise. Walking is a good choice. Bit by bit, increase the amount you walk every day. Try for at least 30 minutes on most days of the week. You also may want to swim, bike, or do other activities. · Avoid or limit alcohol. Talk to your doctor about whether you can drink any alcohol. · Try to limit how much sodium you eat to less than 2,300 milligrams (mg) a day. Your doctor may ask you to try to eat less than 1,500 mg a day. · Eat plenty of fruits (such as bananas and oranges), vegetables, legumes, whole grains, and low-fat dairy products. · Lower the amount of saturated fat in your diet. Saturated fat is found in animal products such as milk, cheese, and meat. Limiting these foods may help you lose weight and also lower your risk for heart disease. · Do not smoke. Smoking increases your risk for heart attack and stroke. If you need help quitting, talk to your doctor about stop-smoking programs and medicines. These can increase your chances of quitting for good. When should you call for help? Call 911 anytime you think you may need emergency care. This may mean having symptoms that suggest that your blood pressure is causing a serious heart or blood vessel problem. Your blood pressure may be over 180/110. ? For example, call 911 if: ? · You have symptoms of a heart attack. These may include: ¨ Chest pain or pressure, or a strange feeling in the chest. 
¨ Sweating. ¨ Shortness of breath. ¨ Nausea or vomiting. ¨ Pain, pressure, or a strange feeling in the back, neck, jaw, or upper belly or in one or both shoulders or arms. ¨ Lightheadedness or sudden weakness. ¨ A fast or irregular heartbeat. ? · You have symptoms of a stroke. These may include: 
¨ Sudden numbness, tingling, weakness, or loss of movement in your face, arm, or leg, especially on only one side of your body. ¨ Sudden vision changes. ¨ Sudden trouble speaking. ¨ Sudden confusion or trouble understanding simple statements. ¨ Sudden problems with walking or balance. ¨ A sudden, severe headache that is different from past headaches. ? · You have severe back or belly pain. ?Do not wait until your blood pressure comes down on its own. Get help right away. ?Call your doctor now or seek immediate care if: 
? · Your blood pressure is much higher than normal (such as 180/110 or higher), but you don't have symptoms. ? · You think high blood pressure is causing symptoms, such as: ¨ Severe headache. ¨ Blurry vision. ? Watch closely for changes in your health, and be sure to contact your doctor if: 
? · Your blood pressure measures 140/90 or higher at least 2 times. That means the top number is 140 or higher or the bottom number is 90 or higher, or both. ? · You think you may be having side effects from your blood pressure medicine. ? · Your blood pressure is usually normal, but it goes above normal at least 2 times. Where can you learn more? Go to http://kerline-bernabe.info/. Enter I965 in the search box to learn more about \"High Blood Pressure: Care Instructions. \" Current as of: September 21, 2016 Content Version: 11.4 © 0571-1113 [employer]wise, [employer].  Care instructions adapted under license by [address] (which disclaims liability or warranty for this information). If you have questions about a medical condition or this instruction, always ask your healthcare professional. [address] any warranty or liability for your use of this information. Introducing \[facility] [place] [facility]\"" & HEALTH SERVICES! Guero Coronado introduces Rypple patient portal. Now you can access parts of your medical record, email your doctor's office, and request medication refills online. 1. In your internet browser, go to [email or web address] 2. Click on the First Time User? Click Here link in the Sign In box. You will see the New Member Sign Up page. 3. Enter your Rypple Access Code exactly as it appears below. You will not need to use this code after youve completed the sign-up process. If you do not sign up before the expiration date, you must request a new code. · Rypple Access Code: [email or web address] Expires: 5/22/2018  4:19 PM 
 
4. Enter the last four digits of your Social Security Number (xxxx) and Date of Birth (mm/dd/yyyy) as indicated and click Submit. You will be taken to the next sign-up page. 5. Create a Rypple ID. This will be your Rypple login ID and cannot be changed, so think of one that is secure and easy to remember. 6. Create a Rypple password. You can change your password at any time. 7. Enter your Password Reset Question and Answer. This can be used at a later time if you forget your password. 8. Enter your e-mail address. You will receive e-mail notification when new information is available in [address] 9. Click Sign Up. You can now view and download portions of your medical record. 10. Click the Download Summary menu link to download a portable copy of your medical information. If you have questions, please visit the Frequently Asked Questions section of the Rypple website.  Remember, Rypple is NOT to be used for urgent needs. For medical emergencies, dial 911. Now available from your iPhone and Android! Please provide this summary of care documentation to your next provider. Your primary care clinician is listed as JOHAN NUGENT. If you have any questions after today's visit, please call [phone].

## 2018-02-21 NOTE — PATIENT INSTRUCTIONS
High Blood Pressure: Care Instructions  Your Care Instructions    If your blood pressure is usually above 140/90, you have high blood pressure, or hypertension. That means the top number is 140 or higher or the bottom number is 90 or higher, or both. Despite what a lot of people think, high blood pressure usually doesn't cause headaches or make you feel dizzy or lightheaded. It usually has no symptoms. But it does increase your risk for heart attack, stroke, and kidney or eye damage. The higher your blood pressure, the more your risk increases. Your doctor will give you a goal for your blood pressure. Your goal will be based on your health and your age. An example of a goal is to keep your blood pressure below 140/90. Lifestyle changes, such as eating healthy and being active, are always important to help lower blood pressure. You might also take medicine to reach your blood pressure goal.  Follow-up care is a key part of your treatment and safety. Be sure to make and go to all appointments, and call your doctor if you are having problems. It's also a good idea to know your test results and keep a list of the medicines you take. How can you care for yourself at home? Medical treatment  · If you stop taking your medicine, your blood pressure will go back up. You may take one or more types of medicine to lower your blood pressure. Be safe with medicines. Take your medicine exactly as prescribed. Call your doctor if you think you are having a problem with your medicine. · Talk to your doctor before you start taking aspirin every day. Aspirin can help certain people lower their risk of a heart attack or stroke. But taking aspirin isn't right for everyone, because it can cause serious bleeding. · See your doctor regularly. You may need to see the doctor more often at first or until your blood pressure comes down.   · If you are taking blood pressure medicine, talk to your doctor before you take decongestants or anti-inflammatory medicine, such as ibuprofen. Some of these medicines can raise blood pressure. · Learn how to check your blood pressure at home. Lifestyle changes  · Stay at a healthy weight. This is especially important if you put on weight around the waist. Losing even 10 pounds can help you lower your blood pressure. · If your doctor recommends it, get more exercise. Walking is a good choice. Bit by bit, increase the amount you walk every day. Try for at least 30 minutes on most days of the week. You also may want to swim, bike, or do other activities. · Avoid or limit alcohol. Talk to your doctor about whether you can drink any alcohol. · Try to limit how much sodium you eat to less than 2,300 milligrams (mg) a day. Your doctor may ask you to try to eat less than 1,500 mg a day. · Eat plenty of fruits (such as bananas and oranges), vegetables, legumes, whole grains, and low-fat dairy products. · Lower the amount of saturated fat in your diet. Saturated fat is found in animal products such as milk, cheese, and meat. Limiting these foods may help you lose weight and also lower your risk for heart disease. · Do not smoke. Smoking increases your risk for heart attack and stroke. If you need help quitting, talk to your doctor about stop-smoking programs and medicines. These can increase your chances of quitting for good. When should you call for help? Call 911 anytime you think you may need emergency care. This may mean having symptoms that suggest that your blood pressure is causing a serious heart or blood vessel problem. Your blood pressure may be over 180/110. ? For example, call 911 if:  ? · You have symptoms of a heart attack. These may include:  ¨ Chest pain or pressure, or a strange feeling in the chest.  ¨ Sweating. ¨ Shortness of breath. ¨ Nausea or vomiting.   ¨ Pain, pressure, or a strange feeling in the back, neck, jaw, or upper belly or in one or both shoulders or arms.  ¨ Lightheadedness or sudden weakness. ¨ A fast or irregular heartbeat. ? · You have symptoms of a stroke. These may include:  ¨ Sudden numbness, tingling, weakness, or loss of movement in your face, arm, or leg, especially on only one side of your body. ¨ Sudden vision changes. ¨ Sudden trouble speaking. ¨ Sudden confusion or trouble understanding simple statements. ¨ Sudden problems with walking or balance. ¨ A sudden, severe headache that is different from past headaches. ? · You have severe back or belly pain. ?Do not wait until your blood pressure comes down on its own. Get help right away. ?Call your doctor now or seek immediate care if:  ? · Your blood pressure is much higher than normal (such as 180/110 or higher), but you don't have symptoms. ? · You think high blood pressure is causing symptoms, such as:  ¨ Severe headache. ¨ Blurry vision. ? Watch closely for changes in your health, and be sure to contact your doctor if:  ? · Your blood pressure measures 140/90 or higher at least 2 times. That means the top number is 140 or higher or the bottom number is 90 or higher, or both. ? · You think you may be having side effects from your blood pressure medicine. ? · Your blood pressure is usually normal, but it goes above normal at least 2 times. Where can you learn more? Go to http://kerline-bernabe.info/. Enter B088 in the search box to learn more about \"High Blood Pressure: Care Instructions. \"  Current as of: September 21, 2016  Content Version: 11.4  © 6024-6899 [employer]. Care instructions adapted under license by [employer] (which disclaims liability or warranty for this information). If you have questions about a medical condition or this instruction, always ask your healthcare professional. [address] [address] any warranty or liability for your use of this information.

## 2018-03-19 ENCOUNTER — OFFICE VISIT (OUTPATIENT)
Dept: ORTHOPEDIC SURGERY | Age: 83
End: 2018-03-19

## 2018-03-19 VITALS
SYSTOLIC BLOOD PRESSURE: 132 MMHG | HEART RATE: 83 BPM | WEIGHT: 167.4 LBS | RESPIRATION RATE: 16 BRPM | DIASTOLIC BLOOD PRESSURE: 64 MMHG | BODY MASS INDEX: 25.37 KG/M2 | TEMPERATURE: 97.7 F | OXYGEN SATURATION: 98 % | HEIGHT: 68 IN

## 2018-03-19 DIAGNOSIS — M43.16 SPONDYLOLISTHESIS AT L4-L5 LEVEL: ICD-10-CM

## 2018-03-19 DIAGNOSIS — M48.061 SPINAL STENOSIS AT L4-L5 LEVEL: Primary | ICD-10-CM

## 2018-03-19 RX ORDER — GABAPENTIN 100 MG/1
CAPSULE ORAL
Qty: 30 CAP | Refills: 1 | Status: SHIPPED | OUTPATIENT
Start: 2018-03-19 | End: 2018-10-01 | Stop reason: SDUPTHER

## 2018-03-19 NOTE — PATIENT INSTRUCTIONS
Lumbar Spinal Stenosis: Care Instructions  Your Care Instructions    Stenosis in the spine is a narrowing of the canal that is around the spinal cord and nerve roots in your back. It can happen as part of aging. Sometimes bone and other tissue grow into this canal and press on the nerves that branch out from the spinal cord. This can cause pain, numbness, and weakness. When it happens in the lower part of your back, it is called lumbar spinal stenosis. It can cause problems in the legs, feet, and rear end (buttocks). You may be able to get relief from the symptoms of spinal stenosis by taking pain medicine. Your doctor may suggest physical therapy and exercises to keep your spine strong and flexible. Some people try steroid shots to reduce swelling. If pain and numbness in your legs are still so bad that you cannot do your normal activities, you may need surgery. Follow-up care is a key part of your treatment and safety. Be sure to make and go to all appointments, and call your doctor if you are having problems. It's also a good idea to know your test results and keep a list of the medicines you take. How can you care for yourself at home? · Take an over-the-counter pain medicine, such as acetaminophen (Tylenol), ibuprofen (Advil, Motrin), or naproxen (Aleve). Be safe with medicines. Read and follow all instructions on the label. · Do not take two or more pain medicines at the same time unless the doctor told you to. Many pain medicines have acetaminophen, which is Tylenol. Too much acetaminophen (Tylenol) can be harmful. · Stay at a healthy weight. Being overweight puts extra strain on your spine. · Change positions often when you sit or stand. This can ease pain. It may also reduce pressure on the spinal cord and its nerves. · Avoid doing things that make your symptoms worse. Walking downhill and standing for a long time may cause pain.   · Stretch and strengthen your back muscles as your doctor or physical therapist recommends. If your doctor says it is okay to do them, these exercises may help. ¨ Lie on your back with your knees bent. Gently pull one bent knee to your chest. Put that foot back on the floor, and then pull the other knee to your chest.  ¨ Do pelvic tilts. Lie on your back with your knees bent. Tighten your stomach muscles. Pull your belly button (navel) in and up toward your ribs. You should feel like your back is pressing to the floor and your hips and pelvis are slightly lifting off the floor. Hold for 6 seconds while breathing smoothly. ¨ Stand with your back flat against a wall. Slowly slide down until your knees are slightly bent. Hold for 10 seconds, then slide back up the wall. · Remove or change anything in your house that may cause you to fall. Keep walkways clear of clutter, electrical cords, and throw rugs. When should you call for help? Call 911 anytime you think you may need emergency care. For example, call if:  ? · You are unable to move a leg at all. ?Call your doctor now or seek immediate medical care if:  ? · You have new or worse symptoms in your legs, belly, or buttocks. Symptoms may include:  ¨ Numbness or tingling. ¨ Weakness. ¨ Pain. ? · You lose bladder or bowel control. ? Watch closely for changes in your health, and be sure to contact your doctor if you are not getting better as expected. Where can you learn more? Go to http://kerline-bernabe.info/. Jan Guy in the search box to learn more about \"Lumbar Spinal Stenosis: Care Instructions. \"  Current as of: March 21, 2017  Content Version: 11.4  © 9541-6989 [employer]. Care instructions adapted under license by [employer] (which disclaims liability or warranty for this information).  If you have questions about a medical condition or this instruction, always ask your healthcare professional. [address] any warranty or liability for your use of this information.

## 2018-03-19 NOTE — MR AVS SNAPSHOT
[address] [facility] 
 
 
 [address] [facility] [address] 
[phone] Patient: Jolly Doe MRN: [id number] Visit Information Date & Time Provider Department Dept. Phone Encounter #  
 3/19/2018  8:40 AM Erum Tristan MD [facility] [phone] [id number] Follow-up Instructions Return in about 6 weeks (around 4/30/2018). Your Appointments 5/7/2018 10:45 AM  
Follow Up with Erum Tristan MD  
[facility] ([address] [facility]) Appt Note: LUISA L4/5 4/10/18  
 [address] [facility] [address]  
[phone]  
  
   
 [address] [facility] [address] 8/16/2018  7:45 AM  
LAB with [facility] ([facility]) Appt Note: 6 mo f/u lab [address] [facility] [address] [address]  
[phone]  
  
   
 [address] [facility]  
  
    
 8/23/2018  1:00 PM  
Office Visit with Sandra Dudley MD  
[address] [facility] [address] [facility]) Appt Note: 6 mo f/u & mwv   
 [address] [facility], [facility] [address] [address]  
[phone]  
  
   
 [address] Upcoming Health Maintenance Date Due DTaP/Tdap/Td series (1 - Tdap) 2/27/1954 ZOSTER VACCINE AGE 60> 12/27/1992 GLAUCOMA SCREENING Q2Y 2/27/1998 Pneumococcal 65+ Low/Medium Risk (1 of 2 - PCV13) 2/27/1998 Influenza Age 5 to Adult 8/1/2017 MEDICARE YEARLY EXAM 2/22/2018 Allergies as of 3/19/2018  Review Complete On: 3/19/2018 By: Demarco Bella LPN Severity Noted Reaction Type Reactions Lisinopril    Other (comments) Irregular heart rate Other Plant, Animal, Environmental Low 10/05/2017    Other (comments) Grass, dust, and maple trees causes congestion Current Immunizations  Never Reviewed No immunizations on file. Not reviewed this visit You Were Diagnosed With   
  
 Codes Comments Spinal stenosis at L4-L5 level    -  Primary ICD-10-CM: M48.061 
ICD-9-CM: 724.02 Spondylolisthesis at L4-L5 level     ICD-10-CM: M43.16 
ICD-9-CM: 756.12 Vitals BP Pulse Temp Resp Height(growth percentile) Weight(growth percentile) 132/64 83 97.7 °F (36.5 °C) (Oral) 16 5' 8\" (1.727 m) 167 lb 6.4 oz (75.9 kg) SpO2 BMI Smoking Status 98% 25.45 kg/m2 Never Smoker BMI and BSA Data Body Mass Index Body Surface Area  
 25.45 kg/m 2 1.91 m 2 Preferred Pharmacy Pharmacy Name Phone [facility] [facility] [phone] Your Updated Medication List  
  
   
This list is accurate as of 3/19/18  9:39 AM.  Always use your most recent med list.  
  
  
  
  
 gabapentin 100 mg capsule Commonly known as:  NEURONTIN Take 1 tab PO QHS PRN for pain  
  
 ibuprofen 100 mg tablet Take 100 mg by mouth every six (6) hours as needed for Pain.  
  
 metoprolol succinate 50 mg XL tablet Commonly known as:  TOPROL-XL  
TAKE ONE TABLET BY MOUTH ONCE DAILY RHINOCORT AQUA 32 mcg/actuation nasal spray Generic drug:  budesonide 2 Sprays by Both Nostrils route daily. Prescriptions Sent to Pharmacy Refills  
 gabapentin (NEURONTIN) 100 mg capsule 1 Sig: Take 1 tab PO QHS PRN for pain  
 Class: Normal  
 Pharmacy: [address] [place] [address] #: [phone] Follow-up Instructions Return in about 6 weeks (around 4/30/2018). Patient Instructions Lumbar Spinal Stenosis: Care Instructions Your Care Instructions Stenosis in the spine is a narrowing of the canal that is around the spinal cord and nerve roots in your back. It can happen as part of aging. Sometimes bone and other tissue grow into this canal and press on the nerves that branch out from the spinal cord. This can cause pain, numbness, and weakness. When it happens in the lower part of your back, it is called lumbar spinal stenosis. It can cause problems in the legs, feet, and rear end (buttocks). You may be able to get relief from the symptoms of spinal stenosis by taking pain medicine. Your doctor may suggest physical therapy and exercises to keep your spine strong and flexible. Some people try steroid shots to reduce swelling. If pain and numbness in your legs are still so bad that you cannot do your normal activities, you may need surgery. Follow-up care is a key part of your treatment and safety. Be sure to make and go to all appointments, and call your doctor if you are having problems. It's also a good idea to know your test results and keep a list of the medicines you take. How can you care for yourself at home? · Take an over-the-counter pain medicine, such as acetaminophen (Tylenol), ibuprofen (Advil, Motrin), or naproxen (Aleve). Be safe with medicines. Read and follow all instructions on the label. · Do not take two or more pain medicines at the same time unless the doctor told you to. Many pain medicines have acetaminophen, which is Tylenol. Too much acetaminophen (Tylenol) can be harmful. · Stay at a healthy weight. Being overweight puts extra strain on your spine. · Change positions often when you sit or stand. This can ease pain. It may also reduce pressure on the spinal cord and its nerves. · Avoid doing things that make your symptoms worse. Walking downhill and standing for a long time may cause pain. · Stretch and strengthen your back muscles as your doctor or physical therapist recommends. If your doctor says it is okay to do them, these exercises may help. ¨ Lie on your back with your knees bent.  Gently pull one bent knee to your chest. Put that foot back on the floor, and then pull the other knee to your chest. 
¨ Do pelvic tilts. Lie on your back with your knees bent. Tighten your stomach muscles. Pull your belly button (navel) in and up toward your ribs. You should feel like your back is pressing to the floor and your hips and pelvis are slightly lifting off the floor. Hold for 6 seconds while breathing smoothly. ¨ Stand with your back flat against a wall. Slowly slide down until your knees are slightly bent. Hold for 10 seconds, then slide back up the wall. · Remove or change anything in your house that may cause you to fall. Keep walkways clear of clutter, electrical cords, and throw rugs. When should you call for help? Call 911 anytime you think you may need emergency care. For example, call if: 
? · You are unable to move a leg at all. ?Call your doctor now or seek immediate medical care if: 
? · You have new or worse symptoms in your legs, belly, or buttocks. Symptoms may include: ¨ Numbness or tingling. ¨ Weakness. ¨ Pain. ? · You lose bladder or bowel control. ? Watch closely for changes in your health, and be sure to contact your doctor if you are not getting better as expected. Where can you learn more? Go to http://kerline-bernabe.info/. Kwesi Gautam in the search box to learn more about \"Lumbar Spinal Stenosis: Care Instructions. \" Current as of: March 21, 2017 Content Version: 11.4 © 6639-0941 [employer]. Care instructions adapted under license by [employer] (which disclaims liability or warranty for this information). If you have questions about a medical condition or this instruction, always ask your healthcare professional. [place] [address] any warranty or liability for your use of this information. Introducing [facility]!    
 Kriss Saucedo introduces BIOSAFE patient portal. Now you can access parts of your medical record, email your doctor's office, and request medication refills online. 1. In your internet browser, go to [email or web address] 2. Click on the First Time User? Click Here link in the Sign In box. You will see the New Member Sign Up page. 3. Enter your HuoBi Access Code exactly as it appears below. You will not need to use this code after youve completed the sign-up process. If you do not sign up before the expiration date, you must request a new code. · HuoBi Access Code: [email or web address] Expires: 5/22/2018  5:19 PM 
 
4. Enter the last four digits of your Social Security Number (xxxx) and Date of Birth (mm/dd/yyyy) as indicated and click Submit. You will be taken to the next sign-up page. 5. Create a HuoBi ID. This will be your HuoBi login ID and cannot be changed, so think of one that is secure and easy to remember. 6. Create a HuoBi password. You can change your password at any time. 7. Enter your Password Reset Question and Answer. This can be used at a later time if you forget your password. 8. Enter your e-mail address. You will receive e-mail notification when new information is available in [address] 9. Click Sign Up. You can now view and download portions of your medical record. 10. Click the Download Summary menu link to download a portable copy of your medical information. If you have questions, please visit the Frequently Asked Questions section of the HuoBi website. Remember, HuoBi is NOT to be used for urgent needs. For medical emergencies, dial 911. Now available from your iPhone and Android! Please provide this summary of care documentation to your next provider. Your primary care clinician is listed as JOHAN NUGENT. If you have any questions after today's visit, please call [phone].

## 2018-03-19 NOTE — PROGRESS NOTES
[facility] [address].  [address]  [facility], [address]  Phone: [phone]  Fax: [phone]        Jaime Green  : 1933  PCP: Rae Gamboa MD    PROGRESS NOTE      ASSESSMENT AND PLAN    Diagnoses and all orders for this visit:    1. Spinal stenosis at L4-L5 level  -     SCHEDULE SURGERY    2. Spondylolisthesis at L4-L5 level  -     SCHEDULE SURGERY    Other orders  -     gabapentin (NEURONTIN) 100 mg capsule; Take 1 tab PO QHS PRN for pain    1. Advised to continue HEP. 2. Set up for LUISA at L4-5.   3. Discussed medications and spinal injections as possible treatment options. 4. Will have to come off of Motrin temporarily before injection. May take Tylenol if helpful. 5. Trial of Gabapentin. 6. Given information on stenosis. Follow-up Disposition:  Return in about 6 weeks (around 2018). HISTORY OF PRESENT ILLNESS  Jeffery Devlin is a 80 y.o. male. Pt presents to the office for a f/u visit for back pain. He underwent a L4-5 LUISA 10/2017. He states that he had about 6 weeks of no pain from his injection. He noticed that he started to require more Motrin during the day. His pain has increased to the point of waking up at night in pain. He returns today as he wishes to discuss more spinal injections. He is now having issues with trying to walk and stand for more than a quarter of a mile. He was able to walk a mile without issue after his spinal injections. Pt reports pain does radiate into BLE, buttocks and thighs. Nothing eases this pain until he takes Motrin. No recent falls or injuries. Pt denies weakness in BLE. Pt denies saddle paresthesias. Pt states he has been using Motrin 800 mg with relief. He is unable to function without his Motrin. Denies persistent fevers, chills, weight changes, neurogenic bowel or bladder symptoms. Pt denies recent ED visits or hospitalizations.  reviewed.  He had 3-4 years of relief from his first injection but only 6 weeks of relief from his second one in 10/2017. Has a pacemaker. He admits to bruise/bleed easily. Pain Assessment  3/19/2018   Location of Pain Back   Severity of Pain 5   Quality of Pain (No Data)   Quality of Pain Comment numbness and tingling   Duration of Pain -   Frequency of Pain Constant   Aggravating Factors (No Data)   Aggravating Factors Comment patient states he is unsure   Relieving Factors NSAID   Relieving Factors Comment Patient states block injection and walking relieves pain    Result of Injury -             CT Results (most recent):    Results from Hospital Encounter encounter on 10/24/13   CT SPINE LUMB WO CONT   Narrative CT Lumbar Spine Without Contrast      HISTORY: Lower back pain. COMPARISON: None. TECHNIQUE: 2.5 contiguous helically acquired axial images with sagittal and  coronal reformations for better evaluation of vertebral alignment,  intervertebral disc space height and interfacet relations. FINDINGS:    Atherosclerotic calcifications in the aorta, without aneurysm. No acute  abnormality observed in the partially included retroperitoneal soft tissues. No suspicious lytic or blastic bony lesion. A lucent lesion with trabeculated  internal components in the posterior left ilium has a benign appearance such as  hemangioma. The lumbar spine vertebra are preserved in height. Slight  anterolisthesis of L4 relative to L3 and L5, of approximately 2 mm. Otherwise  normal alignment. Mild-moderate L4-L5 disc space narrowing. There are a few  tiny osteophytes present in the lumbar spine. The pedicles appear  developmentally short, providing mildly diminished spinal canal caliber. The L5  vertebra is transitional. The wide L5 transverse processes pseudoarticulating  with the sacrum and more notably with bridging osteophytes across the  sacroiliac joints. L4-L5 facet joint hypertrophy. T12-L1: No significant disc protrusion. No canal stenosis.  Neural foramen are  patent. L1-L2: Small broad-based disc bulge with mild flattening of ventral thecal sac,  without canal stenosis. Neural foramen are patent. L2-L3: Mild broad-based disc bulge flattening ventral thecal sac which measures  8 mm AP dimension. There is a small amount of additional posterior epidural  fat, and further mild narrowing of the posterior canal by hypertrophy of  ligamentum flavum and mild facet hypertrophy. Mild bilateral neural foraminal  narrowing. L3-L4: Mild broad-based disc bulge with slight flattening of ventral thecal  sac. Retrolisthesis. 8.5 mm AP thecal sac size. Mild narrowing of the posterior  canal by hypertrophy of ligamentum flavum and mild facet hypertrophy. Moderate  narrowing of the bilateral neural foramen. L4-L5: Anterolisthesis. Additional moderate sized broad-based disc bulge. In  conjunction with moderate bilateral facet joint hypertrophy, and hypertrophy of  the ligamentum flavum, there is severe compression of the thecal sac to  approximately 4 mm. Moderate bilateral neural foraminal narrowing. The left  posterior margin L4 nerve may contact the disc. No definite impingement. Suspect impingement of the descending L5 nerves. L5-S1: Mild left paracentral central disc protrusion with mild impression on  ventral thecal sac. No canal stenosis. Neural foramen are patent. Impression IMPRESSION:  1. Severe multifactorial spinal stenosis at L4-L5 with presumed impingement of  the descending bilateral L5 nerves. 2. Mild canal stenosis at L2-L3 and L3-L4 superimposed on a congenitally,  mildly narrowed spinal canal.  3. Grade I listhesis at L3-L4 and L4-L5. 4. Moderate neural foraminal narrowing at L3-L4 and L4-L5. 5. Transitional L5 vertebra with pseudoarticulating components with the sacrum  and anterior sacroiliac osteophytes. 6. Facet hypertrophy as above, most notable at L4-L5.                PAST MEDICAL HISTORY   Past Medical History:   Diagnosis Date    Atrial fibrillation     paroxysmal   CHADS score 2  (-CHF, +HTN, +AGE, -DM, -CVA)    Back pain     Dyslipidemia     GERD     Hypertrension     Nephrolithiasis     Pacemaker     ,       [employer]    Sick sinus syndrome        Past Surgical History:   Procedure Laterality Date    CARDIAC SURG PROCEDURE UNLIST      Pacemaker    HX APPENDECTOMY      in his early 's    HX CATARACT REMOVAL        bilateral   .      MEDICATIONS    Current Outpatient Prescriptions   Medication Sig Dispense Refill    ibuprofen 100 mg tablet Take 100 mg by mouth every six (6) hours as needed for Pain.  metoprolol succinate (TOPROL-XL) 50 mg XL tablet TAKE ONE TABLET BY MOUTH ONCE DAILY 30 Tab 11    budesonide (RHINOCORT AQUA) 32 mcg/actuation nasal spray 2 Sprays by Both Nostrils route daily. ALLERGIES  Allergies   Allergen Reactions    Lisinopril Other (comments)     Irregular heart rate    Other Plant, Animal, Environmental Other (comments)     Grass, dust, and maple trees causes congestion          SOCIAL HISTORY    Social History     Social History    Marital status:      Spouse name: N/A    Number of children: N/A    Years of education: N/A     Occupational History    Not on file. Social History Main Topics    Smoking status: Never Smoker    Smokeless tobacco: Never Used    Alcohol use 2.5 oz/week     5 Glasses of wine per week    Drug use: No    Sexual activity: Not on file     Other Topics Concern    Not on file     Social History Narrative       FAMILY HISTORY  Family History   Problem Relation Age of Onset    Other Father 39      from intestinal blockage in [de-identified]    Stroke Mother 68       REVIEW OF SYSTEMS  Review of Systems   Constitutional: Negative for chills, fever and weight loss. Respiratory: Negative for shortness of breath. Cardiovascular: Negative for chest pain. Gastrointestinal: Negative for constipation.         Negative for fecal incontinence   Genitourinary: Negative for dysuria. Negative for urinary incontinence   Musculoskeletal:        Per HPI   Skin: Negative for rash. Neurological: Negative for dizziness, tingling, tremors, focal weakness and headaches. Endo/Heme/Allergies: Bruises/bleeds easily. Psychiatric/Behavioral: The patient does not have insomnia. PHYSICAL EXAMINATION  Visit Vitals    /64    Pulse 83    Temp 97.7 °F (36.5 °C) (Oral)    Resp 16    Ht 5' 8\" (1.727 m)    Wt 167 lb 6.4 oz (75.9 kg)    SpO2 98%    BMI 25.45 kg/m2         Accompanied by self. Constitutional:  Well developed, well nourished, in no acute distress. Psychiatric: Affect and mood are appropriate. Integumentary: No rashes or abrasions noted on exposed areas. Cardiovascular/Peripheral Vascular: Intact l pulses. No peripheral edema is noted. Lymphatic:  No evidence of lymphedema. No cervical lymphadenopathy. SPINE/MUSCULOSKELETAL EXAM    Lumbar spine:  No rash, ecchymosis, or gross obliquity. No fasciculations. No focal atrophy is noted. No tenderness to palpation at the sciatic notch. SI joints non-tender. Trochanters non tender. Sensation grossly intact to light touch. MOTOR:       Hip Flex  Quads Hamstrings Ankle DF EHL Ankle PF   Right +4/5 +4/5 +4/5 +4/5 +4/5 +4/5   Left +4/5 +4/5 +4/5 +4/5 +4/5 +4/5     Straight Leg raise negative. Ambulation without assistive device. FWB. Written by Demetria Infante, as dictated by Sofy Almodovar MD.    I, Dr. Sofy Almodovar MD, confirm that all documentation is accurate. Mr. Philly Uriarte may have a reminder for a \"due or due soon\" health maintenance. I have asked that he contact his primary care provider for follow-up on this health maintenance.

## 2018-03-27 ENCOUNTER — HOSPITAL ENCOUNTER (OUTPATIENT)
Age: 83
Setting detail: OUTPATIENT SURGERY
Discharge: HOME | End: 2018-03-27
Attending: PHYSICAL MEDICINE & REHABILITATION | Admitting: PHYSICAL MEDICINE & REHABILITATION
Payer: MEDICARE

## 2018-03-27 ENCOUNTER — APPOINTMENT (OUTPATIENT)
Dept: GENERAL RADIOLOGY | Age: 83
End: 2018-03-27
Attending: PHYSICAL MEDICINE & REHABILITATION
Payer: MEDICARE

## 2018-03-27 VITALS
RESPIRATION RATE: 18 BRPM | HEIGHT: 68 IN | BODY MASS INDEX: 25.31 KG/M2 | WEIGHT: 167 LBS | SYSTOLIC BLOOD PRESSURE: 151 MMHG | OXYGEN SATURATION: 100 % | HEART RATE: 86 BPM | TEMPERATURE: 98 F | DIASTOLIC BLOOD PRESSURE: 81 MMHG

## 2018-03-27 PROCEDURE — 74011636320 HC RX REV CODE- 636/320: Performed by: PHYSICAL MEDICINE & REHABILITATION

## 2018-03-27 PROCEDURE — 76010000009 HC PAIN MGT 0 TO 30 MIN PROC: Performed by: PHYSICAL MEDICINE & REHABILITATION

## 2018-03-27 PROCEDURE — 74011250636 HC RX REV CODE- 250/636: Performed by: PHYSICAL MEDICINE & REHABILITATION

## 2018-03-27 PROCEDURE — 74011250636 HC RX REV CODE- 250/636

## 2018-03-27 PROCEDURE — 74011000250 HC RX REV CODE- 250

## 2018-03-27 PROCEDURE — 74011636320 HC RX REV CODE- 636/320

## 2018-03-27 PROCEDURE — 77030003543 HC NDL EPDRL BBMI -A: Performed by: PHYSICAL MEDICINE & REHABILITATION

## 2018-03-27 PROCEDURE — 77030014124 HC TY EPDRL BBMI -A: Performed by: PHYSICAL MEDICINE & REHABILITATION

## 2018-03-27 RX ORDER — DIAZEPAM 5 MG/1
2.5-1 TABLET ORAL ONCE
Status: DISCONTINUED | OUTPATIENT
Start: 2018-03-27 | End: 2018-03-27 | Stop reason: HOSPADM

## 2018-03-27 RX ORDER — DEXAMETHASONE SODIUM PHOSPHATE 100 MG/10ML
INJECTION INTRAMUSCULAR; INTRAVENOUS AS NEEDED
Status: DISCONTINUED | OUTPATIENT
Start: 2018-03-27 | End: 2018-03-27 | Stop reason: HOSPADM

## 2018-03-27 RX ORDER — LIDOCAINE HYDROCHLORIDE 10 MG/ML
INJECTION, SOLUTION EPIDURAL; INFILTRATION; INTRACAUDAL; PERINEURAL AS NEEDED
Status: DISCONTINUED | OUTPATIENT
Start: 2018-03-27 | End: 2018-03-27 | Stop reason: HOSPADM

## 2018-03-27 NOTE — INTERVAL H&P NOTE
H&P Update:  Virginia Burnett was seen and briefly examined. History and physical has been reviewed.  . There have been no significant clinical changes since the completion of the originally dated History and Physical.    Signed By: Brittany Walker MD     March 27, 2018 3:36 PM

## 2018-03-27 NOTE — H&P (VIEW-ONLY)
[facility] [address]  [address], [address]  [place]  Phone: [phone]  Fax: [phone]        Jonathan Philippe  : 1933  PCP: Eva Gardner MD    PROGRESS NOTE      ASSESSMENT AND PLAN    Diagnoses and all orders for this visit:    1. Spinal stenosis at L4-L5 level  -     SCHEDULE SURGERY    2. Spondylolisthesis at L4-L5 level  -     SCHEDULE SURGERY    Other orders  -     gabapentin (NEURONTIN) 100 mg capsule; Take 1 tab PO QHS PRN for pain    1. Advised to continue HEP. 2. Set up for LUISA at L4-5.   3. Discussed medications and spinal injections as possible treatment options. 4. Will have to come off of Motrin temporarily before injection. May take Tylenol if helpful. 5. Trial of Gabapentin. 6. Given information on stenosis. Follow-up Disposition:  Return in about 6 weeks (around 2018). HISTORY OF PRESENT ILLNESS  Guillermina Bray is a 80 y.o. male. Pt presents to the office for a f/u visit for back pain. He underwent a L4-5 LUISA 10/2017. He states that he had about 6 weeks of no pain from his injection. He noticed that he started to require more Motrin during the day. His pain has increased to the point of waking up at night in pain. He returns today as he wishes to discuss more spinal injections. He is now having issues with trying to walk and stand for more than a quarter of a mile. He was able to walk a mile without issue after his spinal injections. Pt reports pain does radiate into BLE, buttocks and thighs. Nothing eases this pain until he takes Motrin. No recent falls or injuries. Pt denies weakness in BLE. Pt denies saddle paresthesias. Pt states he has been using Motrin 800 mg with relief. He is unable to function without his Motrin. Denies persistent fevers, chills, weight changes, neurogenic bowel or bladder symptoms. Pt denies recent ED visits or hospitalizations.  reviewed.  He had 3-4 years of relief from his first injection but only 6 weeks of relief from his second one in 10/2017. Has a pacemaker. He admits to bruise/bleed easily. Pain Assessment  3/19/2018   Location of Pain Back   Severity of Pain 5   Quality of Pain (No Data)   Quality of Pain Comment numbness and tingling   Duration of Pain -   Frequency of Pain Constant   Aggravating Factors (No Data)   Aggravating Factors Comment patient states he is unsure   Relieving Factors NSAID   Relieving Factors Comment Patient states block injection and walking relieves pain    Result of Injury -             CT Results (most recent):    Results from Hospital Encounter encounter on 10/24/13   CT SPINE LUMB WO CONT   Narrative CT Lumbar Spine Without Contrast      HISTORY: Lower back pain. COMPARISON: None. TECHNIQUE: 2.5 contiguous helically acquired axial images with sagittal and  coronal reformations for better evaluation of vertebral alignment,  intervertebral disc space height and interfacet relations. FINDINGS:    Atherosclerotic calcifications in the aorta, without aneurysm. No acute  abnormality observed in the partially included retroperitoneal soft tissues. No suspicious lytic or blastic bony lesion. A lucent lesion with trabeculated  internal components in the posterior left ilium has a benign appearance such as  hemangioma. The lumbar spine vertebra are preserved in height. Slight  anterolisthesis of L4 relative to L3 and L5, of approximately 2 mm. Otherwise  normal alignment. Mild-moderate L4-L5 disc space narrowing. There are a few  tiny osteophytes present in the lumbar spine. The pedicles appear  developmentally short, providing mildly diminished spinal canal caliber. The L5  vertebra is transitional. The wide L5 transverse processes pseudoarticulating  with the sacrum and more notably with bridging osteophytes across the  sacroiliac joints. L4-L5 facet joint hypertrophy. T12-L1: No significant disc protrusion. No canal stenosis.  Neural foramen are  patent. L1-L2: Small broad-based disc bulge with mild flattening of ventral thecal sac,  without canal stenosis. Neural foramen are patent. L2-L3: Mild broad-based disc bulge flattening ventral thecal sac which measures  8 mm AP dimension. There is a small amount of additional posterior epidural  fat, and further mild narrowing of the posterior canal by hypertrophy of  ligamentum flavum and mild facet hypertrophy. Mild bilateral neural foraminal  narrowing. L3-L4: Mild broad-based disc bulge with slight flattening of ventral thecal  sac. Retrolisthesis. 8.5 mm AP thecal sac size. Mild narrowing of the posterior  canal by hypertrophy of ligamentum flavum and mild facet hypertrophy. Moderate  narrowing of the bilateral neural foramen. L4-L5: Anterolisthesis. Additional moderate sized broad-based disc bulge. In  conjunction with moderate bilateral facet joint hypertrophy, and hypertrophy of  the ligamentum flavum, there is severe compression of the thecal sac to  approximately 4 mm. Moderate bilateral neural foraminal narrowing. The left  posterior margin L4 nerve may contact the disc. No definite impingement. Suspect impingement of the descending L5 nerves. L5-S1: Mild left paracentral central disc protrusion with mild impression on  ventral thecal sac. No canal stenosis. Neural foramen are patent. Impression IMPRESSION:  1. Severe multifactorial spinal stenosis at L4-L5 with presumed impingement of  the descending bilateral L5 nerves. 2. Mild canal stenosis at L2-L3 and L3-L4 superimposed on a congenitally,  mildly narrowed spinal canal.  3. Grade I listhesis at L3-L4 and L4-L5. 4. Moderate neural foraminal narrowing at L3-L4 and L4-L5. 5. Transitional L5 vertebra with pseudoarticulating components with the sacrum  and anterior sacroiliac osteophytes. 6. Facet hypertrophy as above, most notable at L4-L5.                PAST MEDICAL HISTORY   Past Medical History:   Diagnosis Date    Atrial fibrillation     paroxysmal   CHADS score 2  (-CHF, +HTN, +AGE, -DM, -CVA)    Back pain     Dyslipidemia     GERD     Hypertrension     Nephrolithiasis     Pacemaker     ,       [employer]    Sick sinus syndrome        Past Surgical History:   Procedure Laterality Date    CARDIAC SURG PROCEDURE UNLIST      Pacemaker    HX APPENDECTOMY      in his early 's    HX CATARACT REMOVAL        bilateral   .      MEDICATIONS    Current Outpatient Prescriptions   Medication Sig Dispense Refill    ibuprofen 100 mg tablet Take 100 mg by mouth every six (6) hours as needed for Pain.  metoprolol succinate (TOPROL-XL) 50 mg XL tablet TAKE ONE TABLET BY MOUTH ONCE DAILY 30 Tab 11    budesonide (RHINOCORT AQUA) 32 mcg/actuation nasal spray 2 Sprays by Both Nostrils route daily. ALLERGIES  Allergies   Allergen Reactions    Lisinopril Other (comments)     Irregular heart rate    Other Plant, Animal, Environmental Other (comments)     Grass, dust, and maple trees causes congestion          SOCIAL HISTORY    Social History     Social History    Marital status:      Spouse name: N/A    Number of children: N/A    Years of education: N/A     Occupational History    Not on file. Social History Main Topics    Smoking status: Never Smoker    Smokeless tobacco: Never Used    Alcohol use 2.5 oz/week     5 Glasses of wine per week    Drug use: No    Sexual activity: Not on file     Other Topics Concern    Not on file     Social History Narrative       FAMILY HISTORY  Family History   Problem Relation Age of Onset    Other Father 39      from intestinal blockage in [de-identified]    Stroke Mother 68       REVIEW OF SYSTEMS  Review of Systems   Constitutional: Negative for chills, fever and weight loss. Respiratory: Negative for shortness of breath. Cardiovascular: Negative for chest pain. Gastrointestinal: Negative for constipation.         Negative for fecal incontinence   Genitourinary: Negative for dysuria. Negative for urinary incontinence   Musculoskeletal:        Per HPI   Skin: Negative for rash. Neurological: Negative for dizziness, tingling, tremors, focal weakness and headaches. Endo/Heme/Allergies: Bruises/bleeds easily. Psychiatric/Behavioral: The patient does not have insomnia. PHYSICAL EXAMINATION  Visit Vitals    /64    Pulse 83    Temp 97.7 °F (36.5 °C) (Oral)    Resp 16    Ht 5' 8\" (1.727 m)    Wt 167 lb 6.4 oz (75.9 kg)    SpO2 98%    BMI 25.45 kg/m2         Accompanied by self. Constitutional:  Well developed, well nourished, in no acute distress. Psychiatric: Affect and mood are appropriate. Integumentary: No rashes or abrasions noted on exposed areas. Cardiovascular/Peripheral Vascular: Intact l pulses. No peripheral edema is noted. Lymphatic:  No evidence of lymphedema. No cervical lymphadenopathy. SPINE/MUSCULOSKELETAL EXAM    Lumbar spine:  No rash, ecchymosis, or gross obliquity. No fasciculations. No focal atrophy is noted. No tenderness to palpation at the sciatic notch. SI joints non-tender. Trochanters non tender. Sensation grossly intact to light touch. MOTOR:       Hip Flex  Quads Hamstrings Ankle DF EHL Ankle PF   Right +4/5 +4/5 +4/5 +4/5 +4/5 +4/5   Left +4/5 +4/5 +4/5 +4/5 +4/5 +4/5     Straight Leg raise negative. Ambulation without assistive device. FWB. Written by Eva Staton, as dictated by Maynor Ferrer MD.    I, Dr. Maynor Ferrer MD, confirm that all documentation is accurate. Mr. Nicki Sena may have a reminder for a \"due or due soon\" health maintenance. I have asked that he contact his primary care provider for follow-up on this health maintenance.

## 2018-03-27 NOTE — DISCHARGE INSTRUCTIONS
[facility]   ([facility])  Dr. Himanshu Thompson, Dr. NATH [facility], Dr. Shaylee Carroll not drive a car, operate heavy machinery or dangerous equipment for 24 hours. * Activity as tolerated; rest for the remainder of the day. * Resume pre-block medications including those for your family doctor. * Do not drink alcoholic beverages for 24 hours. Alcohol and the medications you have received may interact and cause an adverse reaction. * You may feel better this evening and worse tomorrow, as the numbing medications wears off and the steroid has yet to begin to work. After 48 hrs the steroid should begin to release bringing you relief. * You may shower this evening and remove any bandages. * Avoid hot tubs and heating pads for 24 hours. You may use cold packs on the procedure site as tolerated for the first 24 hours. * If a headache develops, drink plenty of fluids and rest.  Take over the counter medications for headache if needed. If the headache continues longer than 24 hours, call MD at the [address] [place] [address]. [phone]    * Continue taking pain medications as needed. * You may resume your regular diet if tolerated. Otherwise, start with sips of water and advance slowly. * If Diabetic: check your blood sugar three times a day for the next 3 days. If your sugar is greater than 300 call your family doctor. If your sugar is greater than 400, have someone transport you to the nearest Emergency Room. * If you experience any of the following problems, Please Call the [address] [place] [address] at [phone].         * Shortness of Breath    * Fever of 101 or higher    * Nausea / Vomiting    * Severe Headache    * Weakness or numbness in arms or legs that is not      resolving    * Prolonged increase in pain greater than 4 days      DISCHARGE SUMMARY from Nurse      PATIENT INSTRUCTIONS:    After oral sedation, for 24 hours or while taking prescription Narcotics:  · Limit your activities  · Do not drive and operate hazardous machinery  · Do not make important personal or business decisions  · Do  not drink alcoholic beverages  · If you have not urinated within 8 hours after discharge, please contact your surgeon on call. Report the following to your surgeon:  · Excessive pain, swelling, redness or odor of or around the surgical area  · Temperature over 101  · Nausea and vomiting lasting longer than 4 hours or if unable to take medications  · Any signs of decreased circulation or nerve impairment to extremity: change in color, persistent  numbness, tingling, coldness or increase pain  · Any questions            What to do at Home:  Recommended activity: Activity as tolerated, NO DRIVING FOR 12 Hours post injection          *  Please give a list of your current medications to your Primary Care Provider. *  Please update this list whenever your medications are discontinued, doses are      changed, or new medications (including over-the-counter products) are added. *  Please carry medication information at all times in case of emergency situations. These are general instructions for a healthy lifestyle:    No smoking/ No tobacco products/ Avoid exposure to second hand smoke    Surgeon General's Warning:  Quitting smoking now greatly reduces serious risk to your health. Obesity, smoking, and sedentary lifestyle greatly increases your risk for illness    A healthy diet, regular physical exercise & weight monitoring are important for maintaining a healthy lifestyle    You may be retaining fluid if you have a history of heart failure or if you experience any of the following symptoms:  Weight gain of 3 pounds or more overnight or 5 pounds in a week, increased swelling in our hands or feet or shortness of breath while lying flat in bed.   Please call your doctor as soon as you notice any of these symptoms; do not wait until your next office visit. Recognize signs and symptoms of STROKE:    F-face looks uneven    A-arms unable to move or move unevenly    S-speech slurred or non-existent    T-time-call 911 as soon as signs and symptoms begin-DO NOT go       Back to bed or wait to see if you get better-TIME IS BRAIN.

## 2018-03-27 NOTE — PROCEDURES
Intralaminar Epidural Steroid Procedure Note        Patient Name   Ernestine Webber  Date of Procedure: March 27, 2018  Preoperative Diagnosis: Spinal stenosis of lumbar region without neurogenic claudication [M48.061]  Spondylolisthesis at L4-L5 level [M43.16]  Postoperative Diagnosis: Same  Location [facility], [address]      Procedure:  Epidural Steroid Injection    Consent:  Informed consent was obtained prior to the procedure. In addition to the potential risks associated with the procedure itself, the patient was informed both verbally and in writing of the potential side effects of the use of glucocorticoid. The patient appeared to comprehend the informed consent and desired to have the procedure performed. Procedure in Detail:  The patient was taken to the procedure suite and placed in the prone position on the operating table on appropriate padding. The posterior lumbar region was prepped and draped in the usual sterile fashion. Intraoperative fluoroscopy was used to localize the L4-L5 interspace. The skin was infiltrated with 1% lidocaine. An 18-gauge Tuohy needle was advanced into the epidural space at L4-L5 under fluoroscopic guidance using the loss of resistance technique. No cerebrospinal fluid was seen throughout the procedure. Yes  A small amount of Isovue was injected into the epidural space, confirming appropriated needle placement on fluoroscopy. No vascular uptake was identified. Next, 2ml of 1% Lidocaine and 30mg of preservative free Dexamethasone were injected via the Tuohy needle. The needle was removed from the patient. The patient tolerated the procedure well and was discharged home with designated  and care instructions.       Signed By: Breezy Reilly MD                        March 27, 2018

## 2018-08-16 ENCOUNTER — HOSPITAL ENCOUNTER (OUTPATIENT)
Dept: LAB | Age: 83
Discharge: HOME | End: 2018-08-16
Payer: MEDICARE

## 2018-08-16 DIAGNOSIS — E78.5 DYSLIPIDEMIA: ICD-10-CM

## 2018-08-16 DIAGNOSIS — I11.9 BENIGN HYPERTENSIVE HEART DISEASE WITHOUT HEART FAILURE: ICD-10-CM

## 2018-08-16 LAB
ALBUMIN SERPL-MCNC: 3.5 G/DL (ref 3.4–5)
ALBUMIN/GLOB SERPL: 1 {RATIO} (ref 0.8–1.7)
ALP SERPL-CCNC: 64 U/L (ref 45–117)
ALT SERPL-CCNC: 19 U/L (ref 16–61)
ANION GAP SERPL CALC-SCNC: 7 MMOL/L (ref 3–18)
AST SERPL-CCNC: 22 U/L (ref 15–37)
BILIRUB SERPL-MCNC: 0.6 MG/DL (ref 0.2–1)
BUN SERPL-MCNC: 16 MG/DL (ref 7–18)
BUN/CREAT SERPL: 16 (ref 12–20)
CALCIUM SERPL-MCNC: 8.6 MG/DL (ref 8.5–10.1)
CHLORIDE SERPL-SCNC: 108 MMOL/L (ref 100–108)
CHOLEST SERPL-MCNC: 165 MG/DL
CO2 SERPL-SCNC: 29 MMOL/L (ref 21–32)
CREAT SERPL-MCNC: 1.03 MG/DL (ref 0.6–1.3)
GLOBULIN SER CALC-MCNC: 3.5 G/DL (ref 2–4)
GLUCOSE SERPL-MCNC: 95 MG/DL (ref 74–99)
HDLC SERPL-MCNC: 55 MG/DL (ref 40–60)
HDLC SERPL: 3 {RATIO} (ref 0–5)
LDLC SERPL CALC-MCNC: 96.6 MG/DL (ref 0–100)
LIPID PROFILE,FLP: NORMAL
POTASSIUM SERPL-SCNC: 4.4 MMOL/L (ref 3.5–5.5)
PROT SERPL-MCNC: 7 G/DL (ref 6.4–8.2)
SODIUM SERPL-SCNC: 144 MMOL/L (ref 136–145)
TRIGL SERPL-MCNC: 67 MG/DL (ref ?–150)
VLDLC SERPL CALC-MCNC: 13.4 MG/DL

## 2018-08-16 PROCEDURE — 80061 LIPID PANEL: CPT | Performed by: INTERNAL MEDICINE

## 2018-08-16 PROCEDURE — 80053 COMPREHEN METABOLIC PANEL: CPT | Performed by: INTERNAL MEDICINE

## 2018-08-16 PROCEDURE — 36415 COLL VENOUS BLD VENIPUNCTURE: CPT | Performed by: INTERNAL MEDICINE

## 2018-08-23 ENCOUNTER — OFFICE VISIT (OUTPATIENT)
Dept: FAMILY MEDICINE CLINIC | Age: 83
End: 2018-08-23

## 2018-08-23 VITALS
WEIGHT: 166 LBS | RESPIRATION RATE: 20 BRPM | OXYGEN SATURATION: 97 % | SYSTOLIC BLOOD PRESSURE: 117 MMHG | TEMPERATURE: 98 F | BODY MASS INDEX: 25.16 KG/M2 | DIASTOLIC BLOOD PRESSURE: 73 MMHG | HEART RATE: 73 BPM | HEIGHT: 68 IN

## 2018-08-23 DIAGNOSIS — E78.5 DYSLIPIDEMIA: ICD-10-CM

## 2018-08-23 DIAGNOSIS — Z00.00 MEDICARE ANNUAL WELLNESS VISIT, SUBSEQUENT: Primary | ICD-10-CM

## 2018-08-23 DIAGNOSIS — I11.9 BENIGN HYPERTENSIVE HEART DISEASE WITHOUT HEART FAILURE: ICD-10-CM

## 2018-08-23 DIAGNOSIS — I48.0 PAROXYSMAL ATRIAL FIBRILLATION (HCC): ICD-10-CM

## 2018-08-23 NOTE — PROGRESS NOTES
Patient is in the office today for 6 month follow up. 1. Have you been to the ER, urgent care clinic since your last visit? Hospitalized since your last visit? No    2. Have you seen or consulted any other health care providers outside of the [address] [place] [address] since your last visit? Include any pap smears or colon screening. No          This is the Subsequent Medicare Annual Wellness Exam, performed 12 months or more after the Initial AWV or the last Subsequent AWV    I have reviewed the patient's medical history in detail and updated the computerized patient record. History     Past Medical History:   Diagnosis Date    Atrial fibrillation     paroxysmal   CHADS score 2  (-CHF, +HTN, +AGE, -DM, -CVA)    Back pain     Dyslipidemia     GERD     Hypertrension     Nephrolithiasis     Pacemaker     ,       [employer]    Sick sinus syndrome       Past Surgical History:   Procedure Laterality Date    CARDIAC SURG PROCEDURE UNLIST      Pacemaker    HX APPENDECTOMY      in his early 's    HX CATARACT REMOVAL        bilateral    HX PACEMAKER       Current Outpatient Prescriptions   Medication Sig Dispense Refill    metoprolol succinate (TOPROL-XL) 50 mg XL tablet TAKE ONE TABLET BY MOUTH ONCE DAILY 30 Tab 11    ibuprofen 100 mg tablet Take 100 mg by mouth every six (6) hours as needed for Pain.  gabapentin (NEURONTIN) 100 mg capsule Take 1 tab PO QHS PRN for pain 30 Cap 1    budesonide (RHINOCORT AQUA) 32 mcg/actuation nasal spray 2 Sprays by Both Nostrils route daily.        Allergies   Allergen Reactions    Lisinopril Other (comments)     Irregular heart rate    Other Plant, Animal, Environmental Other (comments)     Grass, dust, and maple trees causes congestion     Family History   Problem Relation Age of Onset    Other Father 39      from intestinal blockage in [de-identified]    Stroke Mother 68     Social History   Substance Use Topics    Smoking status: Never Smoker  Smokeless tobacco: Never Used    Alcohol use 2.5 oz/week     5 Glasses of wine per week     Patient Active Problem List   Diagnosis Code    Hypertrension I11.9    Dyslipidemia E78.5    Atrial fibrillation I48.0    Sick sinus syndrome I49.5    ACP (advance care planning) Z71.89    Spinal stenosis at L4-L5 level M48.061    Spondylolisthesis at L4-L5 level M43.16       Depression Risk Factor Screening:     PHQ over the last two weeks 8/23/2018   Little interest or pleasure in doing things Not at all   Feeling down, depressed, irritable, or hopeless Not at all   Total Score PHQ 2 0     Alcohol Risk Factor Screening: You do not drink alcohol or very rarely. Functional Ability and Level of Safety:   Hearing Loss  Hearing is good. The patient needs further evaluation. Activities of Daily Living  The home contains: no safety equipment. Patient does total self care    Fall Risk  Fall Risk Assessment, last 12 mths 8/23/2018   Able to walk? Yes   Fall in past 12 months? No   Fall with injury? -   Number of falls in past 12 months -       Abuse Screen  Patient is not abused    Cognitive Screening   Evaluation of Cognitive Function:  Has your family/caregiver stated any concerns about your memory: no  Normal    Patient Care Team   Patient Care Team:  Henry Narayanan MD as PCP - General (Internal Medicine)  Verna Pollard MD (Cardiology)  Ana Mitchell MD (Ophthalmology)    Glaucoma Screening- 4 years ago pt encouraged to f/u  Pneumonia Vaccine- declines   Shingles Vaccine- declines  Tdap Vaccine- not UTD   Colonoscopy- Declines. Never had one. Declines FIT test.   PSA- declines   Advance Directive- Does not have one. Will consider getting one. 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 Assessment/Plan   Education and counseling provided:  Are appropriate based on today's review and evaluation    Diagnoses and all orders for this visit:    1.  Medicare annual wellness visit, subsequent    2. Hypertrension  -     METABOLIC PANEL, COMPREHENSIVE; Future    3. Dyslipidemia  -     LIPID PANEL; Future    4. Atrial fibrillation        Health Maintenance Due   Topic Date Due    DTaP/Tdap/Td series (1 - Tdap) 02/27/1954    ZOSTER VACCINE AGE 60>  12/27/1992    GLAUCOMA SCREENING Q2Y  02/27/1998    Pneumococcal 65+ Low/Medium Risk (1 of 2 - PCV13) 02/27/1998    Influenza Age 5 to Adult  08/01/2018     A comprehensive 5 year plan for medical care and screening exams was reviewed with pt and they received a copy of it.

## 2018-08-23 NOTE — PROGRESS NOTES
Subjective:       Chief Complaint  The patient presents for follow up of hypertension and high cholesterol. PATRIC Gusman is a 80 y.o. male seen for follow up of hyperlipidemia. Brian has hypertension. Hypertension well controlled, no significant medication side effects noted, on Toprol, hyperlipidemia fairly well controlled at age 80. Pt is followed by cardiology. Allergies are stable on Zyrtec and Nasacort prn and allergy shots. Diet and Lifestyle: generally follows a low fat low cholesterol diet, exercises regularly    Home BP Monitoring: is not measured at home. Other symptoms and concerns: pt's back pain is much improved with doing core exercises daily. Pt is followed by cardiology for his afib        Current Outpatient Prescriptions   Medication Sig    metoprolol succinate (TOPROL-XL) 50 mg XL tablet TAKE ONE TABLET BY MOUTH ONCE DAILY    ibuprofen 100 mg tablet Take 100 mg by mouth every six (6) hours as needed for Pain.  gabapentin (NEURONTIN) 100 mg capsule Take 1 tab PO QHS PRN for pain    budesonide (RHINOCORT AQUA) 32 mcg/actuation nasal spray 2 Sprays by Both Nostrils route daily. No current facility-administered medications for this visit.               Review of Systems  Respiratory: negative for dyspnea on exertion  Cardiovascular: negative for chest pain    Objective:     Visit Vitals    /73 (BP 1 Location: Left arm, BP Patient Position: Sitting)    Pulse 73    Temp 98 °F (36.7 °C) (Oral)    Resp 20    Ht 5' 8\" (1.727 m)    Wt 166 lb (75.3 kg)    SpO2 97%    BMI 25.24 kg/m2        Eyes - pupils equal and reactive, extraocular eye movements intact  Neck - supple, no significant adenopathy, carotids upstroke normal bilaterally, no bruits  Chest - clear to auscultation, no wheezes, rales or rhonchi, symmetric air entry  Heart - normal rate, regular rhythm, normal S1, S2, 3/5 systolic murmur at RSB  Extremities - peripheral pulses normal, no pedal edema, no clubbing or cyanosis      Labs:   Lab Results   Component Value Date/Time    Cholesterol, total 165 08/16/2018 07:39 AM    HDL Cholesterol 55 08/16/2018 07:39 AM    LDL, calculated 96.6 08/16/2018 07:39 AM    Triglyceride 67 08/16/2018 07:39 AM    CHOL/HDL Ratio 3.0 08/16/2018 07:39 AM     Lab Results   Component Value Date/Time    ALT (SGPT) 19 08/16/2018 07:39 AM    AST (SGOT) 22 08/16/2018 07:39 AM    Alk. phosphatase 64 08/16/2018 07:39 AM    Bilirubin, total 0.6 08/16/2018 07:39 AM     Lab Results   Component Value Date/Time    GFR est AA >60 08/16/2018 07:39 AM    GFR est non-AA >60 08/16/2018 07:39 AM    Creatinine 1.03 08/16/2018 07:39 AM    BUN 16 08/16/2018 07:39 AM    Sodium 144 08/16/2018 07:39 AM    Potassium 4.4 08/16/2018 07:39 AM    Chloride 108 08/16/2018 07:39 AM    CO2 29 08/16/2018 07:39 AM              Assessment / Plan     Hypertension well controlled, on Toprol  Hyperlipidemia fairly well controlled with diet       ICD-10-CM ICD-9-CM    1. Medicare annual wellness visit, subsequent Z00.00 V70.0    2. Hypertrension B02.7 390.95 METABOLIC PANEL, COMPREHENSIVE   3. Dyslipidemia E78.5 272.4 LIPID PANEL   4. Atrial fibrillation I48.0 427.31 Stable on Toprol and followed by Cardiology                  Low cholesterol diet, weight control and daily exercise discussed. Follow-up Disposition:  Return in about 6 months (around 2/23/2019) for labs 1 week before. Reviewed plan of care. Patient has provided input and agrees with goals.

## 2018-08-23 NOTE — PATIENT INSTRUCTIONS
Medicare Part B Preventive Services Limitations Recommendation Scheduled   Bone Mass Measurement  (age 72 & older, biennial) Requires diagnosis related to osteoporosis or estrogen deficiency. Biennial benefit unless patient has history of long-term glucocorticoid tx or baseline is needed because initial test was by other method     Cardiovascular Screening Blood Tests (every 5 years)  Total cholesterol, HDL, Triglycerides and ECG Order blood work  as a panel if possible and  for adults with routine risk  an electrocardiogram (ECG) at intervals determined by the provider. Lipid 8/18  cmp 8/18   Colorectal Cancer Screening  -Fecal occult blood test (annual)  -Flexible sigmoidoscopy (5y)  -Screening colonoscopy (10y)  -Barium Enema Colorectal cancer screening should be done for adults age 54-65 with no increased risk factors for colorectal cancer. There are a number of acceptable methods of screening for this type of cancer. Each test has its own benefits and drawbacks. Discuss with your provider what is most appropriate for you during your annual wellness visit.  The different tests include: colonoscopy (considered the best screening method), a fecal occult blood test, a fecal DNA test, and sigmoidoscopy  n/a   Counseling to Prevent Tobacco Use (up to 8 sessions per year)  - Counseling greater than 3 and up to 10 minutes  - Counseling greater than 10 minutes Patients must be asymptomatic of tobacco-related conditions to receive as preventive service     Diabetes Screening Tests (at least every 3 years, Medicare covers annually or at 6-month intervals for prediabetic patients)    Fasting blood sugar (FBS) or glucose tolerance test (GTT) All adults age 38-68 who are overweight should have a diabetes screening test once every three years.   -Other screening tests & preventive services for persons with diabetes include: an eye exam to screen for diabetic retinopathy, a kidney function test, a foot exam, and stricter control over your cholesterol. Diabetes Self-Management Training (DSMT) (no USPSTF recommendation) Requires referral by treating physician for patient with diabetes or renal disease. 10 hours of initial DSMT session of no less than 30 minutes each in a continuous 12-month period. 2 hours of follow-up DSMT in subsequent years. Glaucoma Screening (no USPSTF recommendation) Diabetes mellitus, family history, , age 48 or over,  American, age 72 or over     Human Immunodeficiency Virus (HIV) Screening (annually for increased risk patients)  HIV-1 and HIV-2 by EIA, BOWEN, rapid antibody test, or oral mucosa transudate Patient must be at increased risk for HIV infection per USPSTF guidelines or pregnant. Tests covered annually for patients at increased risk. Pregnant patients may receive up to 3 test during pregnancy. Medical Nutrition Therapy (MNT) (for diabetes or renal disease not recommended schedule) Requires referral by treating physician for patient with diabetes or renal disease. Can be provided in same year as diabetes self-management training (DSMT), and CMS recommends medical nutrition therapy take place after DSMT. Up to 3 hours for initial year and 2 hours in subsequent years. Prostate Cancer Screening (annually up to age 76)  - Digital rectal exam (REMY)  - Prostate specific antigen (PSA) Annually (age 48 or over), REMY not paid separately when covered E/M service is provided on same date  Men up to age 76 may need a screening blood test for prostate cancer at certain intervals, depending on their personal and family history. This decision is between the patient and his provider. Seasonal Influenza Vaccination (annually) All adults should have a flu vaccine yearly        Pneumococcal Vaccination (once after 72) All adults  over age 72 should receive the recommended pneumonia vaccines.  Current USPSTF guidelines recommend a series of two vaccines for the best pneumonia protection. Hepatitis B Vaccinations (if medium/high risk) Medium/high risk factors:  End-stage renal disease,  Hemophiliacs who received Factor VIII or IX concentrates, Clients of institutions for the mentally retarded, Persons who live in the same house as a HepB virus carrier, Homosexual men, Illicit injectable drug abusers. Shingles Vaccination A shingles vaccine is also recommended once in a lifetime after age 61     Ultrasound Screening for Abdominal Aortic Aneurysm (AAA) (once) An Abdominal Aortic Aneurysm (AAA) Screening is recommended for men age 73-68 who has ever smoked in their lifetime.   of the following criteria:  - Men who are 73-68 years old and have smoked more than 100 cigarettes in their lifetime.  -Anyone with a FH of AAA  -Anyone recommended for screening by USPSTF       Hep C All adults born between [facility] should be screened once for Hepatitis C     Tetanus  All adults should have a tetanus vaccine every 10 years

## 2018-08-23 NOTE — MR AVS SNAPSHOT
Tiffanie Francis 
 
 
 [address] [id number] [address] [facility] [address] 
[phone] Patient: Angelica Lew MRN: [id number] JYO:[id number] Visit Information Date & Time Provider Department Dept. Phone Encounter #  
 8/23/2018  1:00 PM Jeremy Ramirez, [address] [place] [address] [phone] [id number] Follow-up Instructions Return in about 6 months (around 2/23/2019) for labs 1 week before. Upcoming Health Maintenance Date Due DTaP/Tdap/Td series (1 - Tdap) 2/27/1954 ZOSTER VACCINE AGE 60> 12/27/1992 GLAUCOMA SCREENING Q2Y 2/27/1998 Pneumococcal 65+ Low/Medium Risk (1 of 2 - PCV13) 2/27/1998 MEDICARE YEARLY EXAM 3/14/2018 Influenza Age 5 to Adult 8/1/2018 Allergies as of 8/23/2018  Review Complete On: 8/23/2018 By: Jeremy Ramirez MD  
  
 Severity Noted Reaction Type Reactions Lisinopril    Other (comments) Irregular heart rate Other Plant, Animal, Environmental Low 10/05/2017    Other (comments) Grass, dust, and maple trees causes congestion Current Immunizations  Never Reviewed No immunizations on file. Not reviewed this visit You Were Diagnosed With   
  
 Codes Comments Medicare annual wellness visit, subsequent    -  Primary ICD-10-CM: Z00.00 ICD-9-CM: V70.0 Paroxysmal atrial fibrillation (HCC)     ICD-10-CM: I48.0 ICD-9-CM: 427.31 Benign hypertensive heart disease without heart failure     ICD-10-CM: I11.9 ICD-9-CM: 402.10 Vitals BP Pulse Temp Resp Height(growth percentile) Weight(growth percentile) 117/73 (BP 1 Location: Left arm, BP Patient Position: Sitting) 73 98 °F (36.7 °C) (Oral) 20 5' 8\" (1.727 m) 166 lb (75.3 kg) SpO2 BMI Smoking Status 97% 25.24 kg/m2 Never Smoker BMI and BSA Data Body Mass Index Body Surface Area  
 25.24 kg/m 2 1.9 m 2 Preferred Pharmacy Pharmacy Name Phone [facility] [facility] [phone] Your Updated Medication List  
  
   
This list is accurate as of 8/23/18  1:31 PM.  Always use your most recent med list.  
  
  
  
  
 gabapentin 100 mg capsule Commonly known as:  NEURONTIN Take 1 tab PO QHS PRN for pain  
  
 ibuprofen 100 mg tablet Take 100 mg by mouth every six (6) hours as needed for Pain.  
  
 metoprolol succinate 50 mg XL tablet Commonly known as:  TOPROL-XL  
TAKE ONE TABLET BY MOUTH ONCE DAILY RHINOCORT AQUA 32 mcg/actuation nasal spray Generic drug:  budesonide 2 Sprays by Both Nostrils route daily. Follow-up Instructions Return in about 6 months (around 2/23/2019) for labs 1 week before. Patient Instructions Medicare Part B Preventive Services Limitations Recommendation Scheduled Bone Mass Measurement 
(age 72 & older, biennial) Requires diagnosis related to osteoporosis or estrogen deficiency. Biennial benefit unless patient has history of long-term glucocorticoid tx or baseline is needed because initial test was by other method Cardiovascular Screening Blood Tests (every 5 years) Total cholesterol, HDL, Triglycerides and ECG Order blood work  as a panel if possible and  for adults with routine risk  an electrocardiogram (ECG) at intervals determined by the provider. Lipid 8/18 
cmp 8/18 Colorectal Cancer Screening 
-Fecal occult blood test (annual) -Flexible sigmoidoscopy (5y) 
-Screening colonoscopy (10y) -Barium Enema Colorectal cancer screening should be done for adults age 54-65 with no increased risk factors for colorectal cancer. There are a number of acceptable methods of screening for this type of cancer. Each test has its own benefits and drawbacks. Discuss with your provider what is most appropriate for you during your annual wellness visit.  The different tests include: colonoscopy (considered the best screening method), a fecal occult blood test, a fecal DNA test, and sigmoidoscopy  n/a Counseling to Prevent Tobacco Use (up to 8 sessions per year) - Counseling greater than 3 and up to 10 minutes - Counseling greater than 10 minutes Patients must be asymptomatic of tobacco-related conditions to receive as preventive service Diabetes Screening Tests (at least every 3 years, Medicare covers annually or at 6-month intervals for prediabetic patients) Fasting blood sugar (FBS) or glucose tolerance test (GTT) All adults age 38-68 who are overweight should have a diabetes screening test once every three years.  
-Other screening tests & preventive services for persons with diabetes include: an eye exam to screen for diabetic retinopathy, a kidney function test, a foot exam, and stricter control over your cholesterol. Diabetes Self-Management Training (DSMT) (no USPSTF recommendation) Requires referral by treating physician for patient with diabetes or renal disease. 10 hours of initial DSMT session of no less than 30 minutes each in a continuous 12-month period. 2 hours of follow-up DSMT in subsequent years. Glaucoma Screening (no USPSTF recommendation) Diabetes mellitus, family history, , age 48 or over,  American, age 72 or over Human Immunodeficiency Virus (HIV) Screening (annually for increased risk patients) HIV-1 and HIV-2 by EIA, BOWEN, rapid antibody test, or oral mucosa transudate Patient must be at increased risk for HIV infection per USPSTF guidelines or pregnant. Tests covered annually for patients at increased risk. Pregnant patients may receive up to 3 test during pregnancy. Medical Nutrition Therapy (MNT) (for diabetes or renal disease not recommended schedule) Requires referral by treating physician for patient with diabetes or renal disease.   Can be provided in same year as diabetes self-management training (DSMT), and CMS recommends medical nutrition therapy take place after DSMT. Up to 3 hours for initial year and 2 hours in subsequent years. Prostate Cancer Screening (annually up to age 76) - Digital rectal exam (REMY) - Prostate specific antigen (PSA) Annually (age 48 or over), REMY not paid separately when covered E/M service is provided on same date Men up to age 76 may need a screening blood test for prostate cancer at certain intervals, depending on their personal and family history. This decision is between the patient and his provider. Seasonal Influenza Vaccination (annually) All adults should have a flu vaccine yearly Pneumococcal Vaccination (once after 72) All adults  over age 72 should receive the recommended pneumonia vaccines. Current USPSTF guidelines recommend a series of two vaccines for the best pneumonia protection. Hepatitis B Vaccinations (if medium/high risk) Medium/high risk factors:  End-stage renal disease, Hemophiliacs who received Factor VIII or IX concentrates, Clients of institutions for the mentally retarded, Persons who live in the same house as a HepB virus carrier, Homosexual men, Illicit injectable drug abusers. Shingles Vaccination A shingles vaccine is also recommended once in a lifetime after age 61 Ultrasound Screening for Abdominal Aortic Aneurysm (AAA) (once) An Abdominal Aortic Aneurysm (AAA) Screening is recommended for men age 73-68 who has ever smoked in their lifetime. of the following criteria: 
- Men who are 73-68 years old and have smoked more than 100 cigarettes in their lifetime. 
-Anyone with a FH of AAA 
-Anyone recommended for screening by USPSTF Hep C All adults born between [birth year] and 1965 should be screened once for Hepatitis C Tetanus  All adults should have a tetanus vaccine every 10 years Introducing [facility] [place] [facility]!    
 Reza Damian introduces [employer] patient portal. Now you can access parts of your medical record, email your doctor's office, and request medication refills online. 1. In your internet browser, go to [email or web address] 2. Click on the First Time User? Click Here link in the Sign In box. You will see the New Member Sign Up page. 3. Enter your Veeq[employer] Access Code exactly as it appears below. You will not need to use this code after youve completed the sign-up process. If you do not sign up before the expiration date, you must request a new code. · Veeq[employer] Access Code: [email or web address] Expires: 11/21/2018  1:31 PM 
 
4. Enter the last four digits of your Social Security Number (xxxx) and Date of Birth (mm/dd/yyyy) as indicated and click Submit. You will be taken to the next sign-up page. 5. Create a Veeqo ID. This will be your Veeqo login ID and cannot be changed, so think of one that is secure and easy to remember. 6. Create a Veeqo password. You can change your password at any time. 7. Enter your Password Reset Question and Answer. This can be used at a later time if you forget your password. 8. Enter your e-mail address. You will receive e-mail notification when new information is available in [address] 9. Click Sign Up. You can now view and download portions of your medical record. 10. Click the Download Summary menu link to download a portable copy of your medical information. If you have questions, please visit the Frequently Asked Questions section of the [employer]ee[employer] website. Remember, Veeqo is NOT to be used for urgent needs. For medical emergencies, dial 911. Now available from your iPhone and Android! Please provide this summary of care documentation to your next provider. Your primary care clinician is listed as JOHAN NUGENT. If you have any questions after today's visit, please call [phone].

## 2018-10-01 ENCOUNTER — OFFICE VISIT (OUTPATIENT)
Dept: ORTHOPEDIC SURGERY | Age: 83
End: 2018-10-01

## 2018-10-01 VITALS
OXYGEN SATURATION: 98 % | DIASTOLIC BLOOD PRESSURE: 82 MMHG | HEART RATE: 83 BPM | HEIGHT: 68 IN | BODY MASS INDEX: 25.37 KG/M2 | WEIGHT: 167.4 LBS | RESPIRATION RATE: 18 BRPM | SYSTOLIC BLOOD PRESSURE: 130 MMHG | TEMPERATURE: 97.7 F

## 2018-10-01 DIAGNOSIS — M48.061 SPINAL STENOSIS AT L4-L5 LEVEL: Primary | ICD-10-CM

## 2018-10-01 DIAGNOSIS — M43.16 SPONDYLOLISTHESIS AT L4-L5 LEVEL: ICD-10-CM

## 2018-10-01 RX ORDER — GABAPENTIN 100 MG/1
CAPSULE ORAL
Qty: 30 CAP | Refills: 2 | Status: SHIPPED | OUTPATIENT
Start: 2018-10-01 | End: 2020-07-27 | Stop reason: SDUPTHER

## 2018-10-01 NOTE — H&P (VIEW-ONLY)
[facility] [address] 
[address], [address] Phone: [phone] Fax: [phone] Duglas Shi : 1933 PCP: Yazan Rowe MD 
 
PROGRESS NOTE ASSESSMENT AND PLAN Diagnoses and all orders for this visit: 1. Spinal stenosis at L4-L5 level 
-     gabapentin (NEURONTIN) 100 mg capsule; Take 1 tab PO QHS PRN for pain  Indications: NEUROPATHIC PAIN 
-     SCHEDULE SURGERY 2. Spondylolisthesis at L4-L5 level 
-     SCHEDULE SURGERY 1. Advised to continue HEP. 2. Set up for LUISA L4-5 3. Continue Gabapentin PRN 4. Given information on lumbar stenosis Follow-up Disposition: 
Return for after injections. HISTORY OF PRESENT ILLNESS Maria R Saez is a 80 y.o. male. Pt presents to the office for a f/u visit for stenosis. Pt underwent L4-5 LUISA 3/18. Last OV given trial of Gabapentin. Pt states he used the Gabapentin prior to his injection and a week after, so he did not have any left for this flare. Pt reports the Gabapentin was helpful especially at night. Pt affirms he has been doing wonderfully since the injection, until two weeks ago. Pt states he went overboard two weeks ago fixing up his yard. Since then he has experienced low back pain and BLE thigh pain and numbness. Wants repeat LUISA. Location of pain: low back Does pain radiate into extremities: BLE thighs Does patient have weakness: no  
Pt denies saddle paresthesias. Medications pt is on: [id number] PRN. Tylenol and Ibuprofen PRN with relief. Pt states he is taking more than he would like to recently. Pt denies recent ED visits or hospitalizations. Denies persistent fevers, chills, weight changes, neurogenic bowel or bladder symptoms. Pt denies any recent fevers, chills, antibiotics, recent cortisone injections, or infections. Treatments patient has tried: 
Physical therapy:Unknown Doing HEP: Unknown Non-opioid medications:  Yes 
 Spinal injections: Yes LUISA 3/18 with benefit 6 months Spinal surgery- No.  
 
 
 reviewed. PMHx of pace maker placement. Pain Assessment  10/1/2018 Location of Pain Back;Leg Location Modifiers Left;Right Severity of Pain 8 Quality of Pain Aching Quality of Pain Comment numbness, tingling Duration of Pain - Frequency of Pain Constant Aggravating Factors Squatting Aggravating Factors Comment - Relieving Factors NSAID Relieving Factors Comment ibuprofen Result of Injury -  
 
 
PAST MEDICAL HISTORY Past Medical History:  
Diagnosis Date  Atrial fibrillation   
 paroxysmal   CHADS score 2  (-CHF, +HTN, +AGE, -DM, -CVA)  Back pain  Dyslipidemia  GERD  Hypertrension  Nephrolithiasis  Pacemaker 11/01,   9/09    [employer]  
 Sick sinus syndrome Past Surgical History:  
Procedure Laterality Date  CARDIAC SURG PROCEDURE UNLIST Pacemaker  HX APPENDECTOMY    
 in his early 19's  HX CATARACT REMOVAL    
 1/07  bilateral  
 HX PACEMAKER Say Bock MEDICATIONS Current Outpatient Prescriptions Medication Sig Dispense Refill  ibuprofen 100 mg tablet Take 100 mg by mouth every six (6) hours as needed for Pain.  metoprolol succinate (TOPROL-XL) 50 mg XL tablet TAKE ONE TABLET BY MOUTH ONCE DAILY 30 Tab 11  
 budesonide (RHINOCORT AQUA) 32 mcg/actuation nasal spray 2 Sprays by Both Nostrils route daily.  gabapentin (NEURONTIN) 100 mg capsule Take 1 tab PO QHS PRN for pain 30 Cap 1 ALLERGIES Allergies Allergen Reactions  Lisinopril Other (comments) Irregular heart rate  Other Plant, Animal, Environmental Other (comments) Grass, dust, and maple trees causes congestion SOCIAL HISTORY Social History Social History  Marital status:  Spouse name: N/A  
 Number of children: N/A  
 Years of education: N/A Occupational History  Not on file. Social History Main Topics  Smoking status: Never Smoker  Smokeless tobacco: Never Used  Alcohol use 2.5 oz/week  
  5 Glasses of wine per week  Drug use: No  
 Sexual activity: Not on file Other Topics Concern  Not on file Social History Narrative FAMILY HISTORY Family History Problem Relation Age of Onset  Other Father 39  
   from intestinal blockage in [facility]  Stroke Mother 68 REVIEW OF SYSTEMS Review of Systems Constitutional: Negative for chills, fever and weight loss. Respiratory: Negative for shortness of breath. Cardiovascular: Negative for chest pain. Gastrointestinal: Negative for constipation. Negative for fecal incontinence Genitourinary: Negative for dysuria. Negative for urinary incontinence Musculoskeletal:  
     Per HPI Skin: Negative for rash. Neurological: Positive for tingling. Negative for dizziness, tremors, focal weakness and headaches. Endo/Heme/Allergies: Bruises/bleeds easily. Psychiatric/Behavioral: The patient does not have insomnia. PHYSICAL EXAMINATION Visit Vitals  /82  Pulse 83  Temp 97.7 °F (36.5 °C)  Resp 18  Ht 5' 8\" (1.727 m)  Wt 167 lb 6.4 oz (75.9 kg)  SpO2 98%  BMI 25.45 kg/m2 Accompanied by self. Constitutional:  Well developed, well nourished, in no acute distress. Psychiatric: Affect and mood are appropriate. Integumentary: No rashes or abrasions noted on exposed areas. Cardiovascular/Peripheral Vascular: Intact l pulses. No peripheral edema is noted. Lymphatic:  No evidence of lymphedema. No cervical lymphadenopathy. SPINE/MUSCULOSKELETAL EXAM 
 
Lumbar spine: No rash, ecchymosis, or gross obliquity. No fasciculations. No focal atrophy is noted. Tenderness to palpation midline L4-5. No tenderness to palpation at the sciatic notch. SI joints non-tender. Trochanters non tender. Sensation grossly intact to light touch.  
 
 
MOTOR:   
 
 Hip Flex  Quads Hamstrings Ankle DF EHL Ankle PF Right +4/5 +4/5 +4/5 +4/5 +4/5 +4/5 Left +4/5 +4/5 +4/5 +4/5 +4/5 +4/5 Straight Leg raise negative. Ambulation without assistive device. FWB. Written by Duglas White, as dictated by Mike Allen MD. 
 
I, Dr. Mike Allen MD, confirm that all documentation is accurate. Mr. Levi Saucedo may have a reminder for a \"due or due soon\" health maintenance. I have asked that he contact his primary care provider for follow-up on this health maintenance.

## 2018-10-01 NOTE — PATIENT INSTRUCTIONS
Lumbar Spinal Stenosis: Care Instructions Your Care Instructions Stenosis in the spine is a narrowing of the canal that is around the spinal cord and nerve roots in your back. It can happen as part of aging. Sometimes bone and other tissue grow into this canal and press on the nerves that branch out from the spinal cord. This can cause pain, numbness, and weakness. When it happens in the lower part of your back, it is called lumbar spinal stenosis. It can cause problems in the legs, feet, and rear end (buttocks). You may be able to get relief from the symptoms of spinal stenosis by taking pain medicine. Your doctor may suggest physical therapy and exercises to keep your spine strong and flexible. Some people try steroid shots to reduce swelling. If pain and numbness in your legs are still so bad that you cannot do your normal activities, you may need surgery. Follow-up care is a key part of your treatment and safety. Be sure to make and go to all appointments, and call your doctor if you are having problems. It's also a good idea to know your test results and keep a list of the medicines you take. How can you care for yourself at home? · Take an over-the-counter pain medicine. Nonsteroidal anti-inflammatory drugs (NSAIDs) such as ibuprofen or naproxen seem to work best. But if you can't take NSAIDs, you can try acetaminophen. Be safe with medicines. Read and follow all instructions on the label. · Do not take two or more pain medicines at the same time unless the doctor told you to. Many pain medicines have acetaminophen, which is Tylenol. Too much acetaminophen (Tylenol) can be harmful. · Stay at a healthy weight. Being overweight puts extra strain on your spine. · Change positions often when you sit or stand. This can ease pain. It may also reduce pressure on the spinal cord and its nerves. · Avoid doing things that make your symptoms worse.  Walking downhill and standing for a long time may cause pain. · Stretch and strengthen your back muscles as your doctor or physical therapist recommends. If your doctor says it is okay to do them, these exercises may help. ¨ Lie on your back with your knees bent. Gently pull one bent knee to your chest. Put that foot back on the floor, and then pull the other knee to your chest. 
¨ Do pelvic tilts. Lie on your back with your knees bent. Tighten your stomach muscles. Pull your belly button (navel) in and up toward your ribs. You should feel like your back is pressing to the floor and your hips and pelvis are slightly lifting off the floor. Hold for 6 seconds while breathing smoothly. ¨ Stand with your back flat against a wall. Slowly slide down until your knees are slightly bent. Hold for 10 seconds, then slide back up the wall. · Remove or change anything in your house that may cause you to fall. Keep walkways clear of clutter, electrical cords, and throw rugs. When should you call for help? Call 911 anytime you think you may need emergency care. For example, call if: 
  · You are unable to move a leg at all.  
[facility] your doctor now or seek immediate medical care if: 
  · You have new or worse symptoms in your legs, belly, or buttocks. Symptoms may include: ¨ Numbness or tingling. ¨ Weakness. ¨ Pain.  
  · You lose bladder or bowel control.  
 Watch closely for changes in your health, and be sure to contact your doctor if: 
  · You have a fever, lose weight, or don't feel well.  
  · You are not getting better as expected. Where can you learn more? Go to http://kerline-bernabe.info/. Ami Rasheed in the search box to learn more about \"Lumbar Spinal Stenosis: Care Instructions. \" Current as of: November 29, 2017 Content Version: 11.7 © 1983-4050 [employer].  Care instructions adapted under license by [employer] (which disclaims liability or warranty for this information). If you have questions about a medical condition or this instruction, always ask your healthcare professional. [place] [address] any warranty or liability for your use of this information.

## 2018-10-01 NOTE — PROGRESS NOTES
[facility] [address]. 
[address], [address] Phone: [phone] Fax: [phone] Hany Bryson : 1933 PCP: Luther Mccracken MD 
 
PROGRESS NOTE ASSESSMENT AND PLAN Diagnoses and all orders for this visit: 1. Spinal stenosis at L4-L5 level 
-     gabapentin (NEURONTIN) 100 mg capsule; Take 1 tab PO QHS PRN for pain  Indications: NEUROPATHIC PAIN 
-     SCHEDULE SURGERY 2. Spondylolisthesis at L4-L5 level 
-     SCHEDULE SURGERY 1. Advised to continue HEP. 2. Set up for LUISA L4-5 3. Continue Gabapentin PRN 4. Given information on lumbar stenosis Follow-up Disposition: 
Return for after injections. HISTORY OF PRESENT ILLNESS Pratik Gant is a 80 y.o. male. Pt presents to the office for a f/u visit for stenosis. Pt underwent L4-5 LUISA 3/18. Last OV given trial of Gabapentin. Pt states he used the Gabapentin prior to his injection and a week after, so he did not have any left for this flare. Pt reports the Gabapentin was helpful especially at night. Pt affirms he has been doing wonderfully since the injection, until two weeks ago. Pt states he went overboard two weeks ago fixing up his yard. Since then he has experienced low back pain and BLE thigh pain and numbness. Wants repeat LUISA. Location of pain: low back Does pain radiate into extremities: BLE thighs Does patient have weakness: no  
Pt denies saddle paresthesias. Medications pt is on: [id number] PRN. Tylenol and Ibuprofen PRN with relief. Pt states he is taking more than he would like to recently. Pt denies recent ED visits or hospitalizations. Denies persistent fevers, chills, weight changes, neurogenic bowel or bladder symptoms. Pt denies any recent fevers, chills, antibiotics, recent cortisone injections, or infections. Treatments patient has tried: 
Physical therapy:Unknown Doing HEP: Unknown Non-opioid medications:  Yes 
 Spinal injections: Yes LUISA 3/18 with benefit 6 months Spinal surgery- No.  
 
 
 reviewed. PMHx of pace maker placement. Pain Assessment  10/1/2018 Location of Pain Back;Leg Location Modifiers Left;Right Severity of Pain 8 Quality of Pain Aching Quality of Pain Comment numbness, tingling Duration of Pain - Frequency of Pain Constant Aggravating Factors Squatting Aggravating Factors Comment - Relieving Factors NSAID Relieving Factors Comment ibuprofen Result of Injury -  
 
 
PAST MEDICAL HISTORY Past Medical History:  
Diagnosis Date  Atrial fibrillation   
 paroxysmal   CHADS score 2  (-CHF, +HTN, +AGE, -DM, -CVA)  Back pain  Dyslipidemia  GERD  Hypertrension  Nephrolithiasis  Pacemaker 11/01,   9/09    [employer]  
 Sick sinus syndrome Past Surgical History:  
Procedure Laterality Date  CARDIAC SURG PROCEDURE UNLIST Pacemaker  HX APPENDECTOMY    
 in his early 19's  HX CATARACT REMOVAL    
 1/07  bilateral  
 HX PACEMAKER Say Bock MEDICATIONS Current Outpatient Prescriptions Medication Sig Dispense Refill  ibuprofen 100 mg tablet Take 100 mg by mouth every six (6) hours as needed for Pain.  metoprolol succinate (TOPROL-XL) 50 mg XL tablet TAKE ONE TABLET BY MOUTH ONCE DAILY 30 Tab 11  
 budesonide (RHINOCORT AQUA) 32 mcg/actuation nasal spray 2 Sprays by Both Nostrils route daily.  gabapentin (NEURONTIN) 100 mg capsule Take 1 tab PO QHS PRN for pain 30 Cap 1 ALLERGIES Allergies Allergen Reactions  Lisinopril Other (comments) Irregular heart rate  Other Plant, Animal, Environmental Other (comments) Grass, dust, and maple trees causes congestion SOCIAL HISTORY Social History Social History  Marital status:  Spouse name: N/A  
 Number of children: N/A  
 Years of education: N/A Occupational History  Not on file. Social History Main Topics  Smoking status: Never Smoker  Smokeless tobacco: Never Used  Alcohol use 2.5 oz/week  
  5 Glasses of wine per week  Drug use: No  
 Sexual activity: Not on file Other Topics Concern  Not on file Social History Narrative FAMILY HISTORY Family History Problem Relation Age of Onset  Other Father 39  
   from intestinal blockage in [facility]  Stroke Mother 68 REVIEW OF SYSTEMS Review of Systems Constitutional: Negative for chills, fever and weight loss. Respiratory: Negative for shortness of breath. Cardiovascular: Negative for chest pain. Gastrointestinal: Negative for constipation. Negative for fecal incontinence Genitourinary: Negative for dysuria. Negative for urinary incontinence Musculoskeletal:  
     Per HPI Skin: Negative for rash. Neurological: Positive for tingling. Negative for dizziness, tremors, focal weakness and headaches. Endo/Heme/Allergies: Bruises/bleeds easily. Psychiatric/Behavioral: The patient does not have insomnia. PHYSICAL EXAMINATION Visit Vitals  /82  Pulse 83  Temp 97.7 °F (36.5 °C)  Resp 18  Ht 5' 8\" (1.727 m)  Wt 167 lb 6.4 oz (75.9 kg)  SpO2 98%  BMI 25.45 kg/m2 Accompanied by self. Constitutional:  Well developed, well nourished, in no acute distress. Psychiatric: Affect and mood are appropriate. Integumentary: No rashes or abrasions noted on exposed areas. Cardiovascular/Peripheral Vascular: Intact l pulses. No peripheral edema is noted. Lymphatic:  No evidence of lymphedema. No cervical lymphadenopathy. SPINE/MUSCULOSKELETAL EXAM 
 
Lumbar spine: No rash, ecchymosis, or gross obliquity. No fasciculations. No focal atrophy is noted. Tenderness to palpation midline L4-5. No tenderness to palpation at the sciatic notch. SI joints non-tender. Trochanters non tender. Sensation grossly intact to light touch.  
 
 
MOTOR:   
 
 Hip Flex  Quads Hamstrings Ankle DF EHL Ankle PF Right +4/5 +4/5 +4/5 +4/5 +4/5 +4/5 Left +4/5 +4/5 +4/5 +4/5 +4/5 +4/5 Straight Leg raise negative. Ambulation without assistive device. FWB. Written by Lelia Ross, as dictated by Keerthi Zacarias MD. 
 
I, Dr. Keerthi Zacarias MD, confirm that all documentation is accurate. Mr. Sarah Em may have a reminder for a \"due or due soon\" health maintenance. I have asked that he contact his primary care provider for follow-up on this health maintenance.

## 2018-10-01 NOTE — MR AVS SNAPSHOT
[address] [facility] [address] [facility] [address] 
[phone] Patient: Shena Crook MRN: [id number] FTF:4/98/2182 Visit Information Date & Time Provider Department Dept. Phone Encounter #  
 10/1/2018 11:10 AM Erum Tristan MD [facility] [phone] [id number] Follow-up Instructions Return for after injections. Your Appointments 2/18/2019  7:45 AM  
LAB with [facility] ([facility]) Appt Note: lab [address] [facility] [address] [address]  
[phone]  
  
   
 [address], [place] [facility]  
  
    
 2/25/2019  1:00 PM  
Office Visit with Kristina Reid MD  
[address] [facility]) Appt Note: ov  
 [address], [place] [address] [address]  
[phone]  
  
   
 [address] Upcoming Health Maintenance Date Due DTaP/Tdap/Td series (1 - Tdap) 2/27/1954 Shingrix Vaccine Age 50> (1 of 2) 2/27/1983 GLAUCOMA SCREENING Q2Y 2/27/1998 Pneumococcal 65+ Low/Medium Risk (1 of 2 - PCV13) 2/27/1998 Influenza Age 5 to Adult 8/1/2018 MEDICARE YEARLY EXAM 8/24/2019 Allergies as of 10/1/2018  Review Complete On: 10/1/2018 By: Norm Eric Severity Noted Reaction Type Reactions Lisinopril    Other (comments) Irregular heart rate Other Plant, Animal, Environmental Low 10/05/2017    Other (comments) Grass, dust, and maple trees causes congestion Current Immunizations  Never Reviewed No immunizations on file. Not reviewed this visit You Were Diagnosed With   
  
 Codes Comments Spinal stenosis at L4-L5 level    -  Primary ICD-10-CM: M48.061 
ICD-9-CM: 724.02 Spondylolisthesis at L4-L5 level     ICD-10-CM: M43.16 
ICD-9-CM: 756.12 Vitals BP Pulse Temp Resp Height(growth percentile) Weight(growth percentile) 130/82 83 97.7 °F (36.5 °C) 18 5' 8\" (1.727 m) 167 lb 6.4 oz (75.9 kg) SpO2 BMI Smoking Status 98% 25.45 kg/m2 Never Smoker Vitals History BMI and BSA Data Body Mass Index Body Surface Area  
 25.45 kg/m 2 1.91 m 2 Preferred Pharmacy Pharmacy Name Phone [phone] [facility] [phone] Your Updated Medication List  
  
   
This list is accurate as of 10/1/18 12:18 PM.  Always use your most recent med list.  
  
  
  
  
 gabapentin 100 mg capsule Commonly known as:  NEURONTIN Take 1 tab PO QHS PRN for pain  Indications: NEUROPATHIC PAIN  
  
 ibuprofen 100 mg tablet Take 100 mg by mouth every six (6) hours as needed for Pain.  
  
 metoprolol succinate 50 mg XL tablet Commonly known as:  TOPROL-XL  
TAKE ONE TABLET BY MOUTH ONCE DAILY RHINOCORT AQUA 32 mcg/actuation nasal spray Generic drug:  budesonide 2 Sprays by Both Nostrils route daily. Prescriptions Printed Refills  
 gabapentin (NEURONTIN) 100 mg capsule 2 Sig: Take 1 tab PO QHS PRN for pain  Indications: NEUROPATHIC PAIN Class: Print Follow-up Instructions Return for after injections. Patient Instructions Lumbar Spinal Stenosis: Care Instructions Your Care Instructions Stenosis in the spine is a narrowing of the canal that is around the spinal cord and nerve roots in your back. It can happen as part of aging. Sometimes bone and other tissue grow into this canal and press on the nerves that branch out from the spinal cord. This can cause pain, numbness, and weakness. When it happens in the lower part of your back, it is called lumbar spinal stenosis. It can cause problems in the legs, feet, and rear end (buttocks).  
You may be able to get relief from the symptoms of spinal stenosis by taking pain medicine. Your doctor may suggest physical therapy and exercises to keep your spine strong and flexible. Some people try steroid shots to reduce swelling. If pain and numbness in your legs are still so bad that you cannot do your normal activities, you may need surgery. Follow-up care is a key part of your treatment and safety. Be sure to make and go to all appointments, and call your doctor if you are having problems. It's also a good idea to know your test results and keep a list of the medicines you take. How can you care for yourself at home? · Take an over-the-counter pain medicine. Nonsteroidal anti-inflammatory drugs (NSAIDs) such as ibuprofen or naproxen seem to work best. But if you can't take NSAIDs, you can try acetaminophen. Be safe with medicines. Read and follow all instructions on the label. · Do not take two or more pain medicines at the same time unless the doctor told you to. Many pain medicines have acetaminophen, which is Tylenol. Too much acetaminophen (Tylenol) can be harmful. · Stay at a healthy weight. Being overweight puts extra strain on your spine. · Change positions often when you sit or stand. This can ease pain. It may also reduce pressure on the spinal cord and its nerves. · Avoid doing things that make your symptoms worse. Walking downhill and standing for a long time may cause pain. · Stretch and strengthen your back muscles as your doctor or physical therapist recommends. If your doctor says it is okay to do them, these exercises may help. ¨ Lie on your back with your knees bent. Gently pull one bent knee to your chest. Put that foot back on the floor, and then pull the other knee to your chest. 
¨ Do pelvic tilts. Lie on your back with your knees bent. Tighten your stomach muscles. Pull your belly button (navel) in and up toward your ribs.  You should feel like your back is pressing to the floor and your hips and pelvis are slightly lifting off the floor. Hold for 6 seconds while breathing smoothly. ¨ Stand with your back flat against a wall. Slowly slide down until your knees are slightly bent. Hold for 10 seconds, then slide back up the wall. · Remove or change anything in your house that may cause you to fall. Keep walkways clear of clutter, electrical cords, and throw rugs. When should you call for help? Call 911 anytime you think you may need emergency care. For example, call if: 
  · You are unable to move a leg at all.  
[facility] your doctor now or seek immediate medical care if: 
  · You have new or worse symptoms in your legs, belly, or buttocks. Symptoms may include: ¨ Numbness or tingling. ¨ Weakness. ¨ Pain.  
  · You lose bladder or bowel control.  
 Watch closely for changes in your health, and be sure to contact your doctor if: 
  · You have a fever, lose weight, or don't feel well.  
  · You are not getting better as expected. Where can you learn more? Go to http://kerline-bernabe.info/. Maira Muñoz in the search box to learn more about \"Lumbar Spinal Stenosis: Care Instructions. \" Current as of: November 29, 2017 Content Version: 11.7 © 6937-1362 [employer]. Care instructions adapted under license by [employer] (which disclaims liability or warranty for this information). If you have questions about a medical condition or this instruction, always ask your healthcare professional. [place] [address] any warranty or liability for your use of this information. Introducing [facility]! [address] introduces [employer] patient portal. Now you can access parts of your medical record, email your doctor's office, and request medication refills online. 1. In your internet browser, go to [email or web address] 2. Click on the First Time User? Click Here link in the Sign In box.  You will see the New Member Sign Up page. 3. Enter your [employer] Access Code exactly as it appears below. You will not need to use this code after youve completed the sign-up process. If you do not sign up before the expiration date, you must request a new code. · [employer] Access Code: [email or web address] Expires: 11/21/2018  1:31 PM 
 
4. Enter the last four digits of your Social Security Number (xxxx) and Date of Birth (mm/dd/yyyy) as indicated and click Submit. You will be taken to the next sign-up page. 5. Create a [employer] ID. This will be your [employer] login ID and cannot be changed, so think of one that is secure and easy to remember. 6. Create a [employer] password. You can change your password at any time. 7. Enter your Password Reset Question and Answer. This can be used at a later time if you forget your password. 8. Enter your e-mail address. You will receive e-mail notification when new information is available in [address] 9. Click Sign Up. You can now view and download portions of your medical record. 10. Click the Download Summary menu link to download a portable copy of your medical information. If you have questions, please visit the Frequently Asked Questions section of the [employer] website. Remember, [employer] is NOT to be used for urgent needs. For medical emergencies, dial 911. Now available from your iPhone and Android! Please provide this summary of care documentation to your next provider. Your primary care clinician is listed as JOHAN NUGENT. If you have any questions after today's visit, please call [phone].

## 2018-10-30 ENCOUNTER — APPOINTMENT (OUTPATIENT)
Dept: GENERAL RADIOLOGY | Age: 83
End: 2018-10-30
Attending: PHYSICAL MEDICINE & REHABILITATION
Payer: MEDICARE

## 2018-10-30 ENCOUNTER — HOSPITAL ENCOUNTER (OUTPATIENT)
Age: 83
Setting detail: OUTPATIENT SURGERY
Discharge: HOME | End: 2018-10-30
Attending: PHYSICAL MEDICINE & REHABILITATION | Admitting: PHYSICAL MEDICINE & REHABILITATION
Payer: MEDICARE

## 2018-10-30 VITALS
WEIGHT: 167 LBS | DIASTOLIC BLOOD PRESSURE: 72 MMHG | BODY MASS INDEX: 25.31 KG/M2 | HEART RATE: 90 BPM | SYSTOLIC BLOOD PRESSURE: 143 MMHG | HEIGHT: 68 IN | TEMPERATURE: 98.2 F | OXYGEN SATURATION: 99 % | RESPIRATION RATE: 16 BRPM

## 2018-10-30 PROCEDURE — 76010000009 HC PAIN MGT 0 TO 30 MIN PROC: Performed by: PHYSICAL MEDICINE & REHABILITATION

## 2018-10-30 PROCEDURE — 77030003543 HC NDL EPDRL BBMI -A: Performed by: PHYSICAL MEDICINE & REHABILITATION

## 2018-10-30 PROCEDURE — 74011250636 HC RX REV CODE- 250/636

## 2018-10-30 PROCEDURE — 74011636320 HC RX REV CODE- 636/320: Performed by: PHYSICAL MEDICINE & REHABILITATION

## 2018-10-30 PROCEDURE — 77030014124 HC TY EPDRL BBMI -A: Performed by: PHYSICAL MEDICINE & REHABILITATION

## 2018-10-30 PROCEDURE — 74011250636 HC RX REV CODE- 250/636: Performed by: PHYSICAL MEDICINE & REHABILITATION

## 2018-10-30 PROCEDURE — 74011636320 HC RX REV CODE- 636/320

## 2018-10-30 RX ORDER — DEXAMETHASONE SODIUM PHOSPHATE 100 MG/10ML
INJECTION INTRAMUSCULAR; INTRAVENOUS AS NEEDED
Status: DISCONTINUED | OUTPATIENT
Start: 2018-10-30 | End: 2018-10-30 | Stop reason: HOSPADM

## 2018-10-30 RX ORDER — DIAZEPAM 5 MG/1
5-20 TABLET ORAL ONCE
Status: DISCONTINUED | OUTPATIENT
Start: 2018-10-30 | End: 2018-10-30 | Stop reason: HOSPADM

## 2018-10-30 RX ORDER — LIDOCAINE HYDROCHLORIDE 10 MG/ML
INJECTION, SOLUTION EPIDURAL; INFILTRATION; INTRACAUDAL; PERINEURAL AS NEEDED
Status: DISCONTINUED | OUTPATIENT
Start: 2018-10-30 | End: 2018-10-30 | Stop reason: HOSPADM

## 2018-10-30 NOTE — PROCEDURES
Intralaminar Epidural Steroid Procedure Note        Patient Name   Cherry Brody  Date of Procedure: October 30, 2018  Preoperative Diagnosis: Lumbar spinal stenosis  Postoperative Diagnosis: Same  Location [facility], [address]      Procedure:  Epidural Steroid Injection    Consent:  Informed consent was obtained prior to the procedure. In addition to the potential risks associated with the procedure itself, the patient was informed both verbally and in writing of the potential side effects of the use of glucocorticoid. The patient appeared to comprehend the informed consent and desired to have the procedure performed. Procedure in Detail:  The patient was taken to the procedure suite and placed in the prone position on the operating table on appropriate padding. The posterior lumbar region was prepped and draped in the usual sterile fashion. Intraoperative fluoroscopy was used to localize the L4-L5 interspace. Difficult entry at 25 degrees tilt. The skin was infiltrated with 1% lidocaine. An 18-gauge Tuohy needle was advanced into the epidural space at L4-L5 under fluoroscopic guidance using the loss of resistance technique. No cerebrospinal fluid was seen throughout the procedure. Yes  A small amount of Isovue was injected into the epidural space, confirming appropriated needle placement on fluoroscopy. No vascular uptake was identified. Next, 2ml of 1% Lidocaine and 30mg of preservative free Dexamethasone were injected via the Tuohy needle. The needle was removed from the patient. The patient tolerated the procedure well and was discharged home with designated  and care instructions. Approach at L3/4 in future.       Signed By: Nunu Jackson MD                        October 30, 2018

## 2018-10-30 NOTE — DISCHARGE INSTRUCTIONS
[facility]   ([facility])  Dr. Jimena Patricio, Dr. Brady Harrison, Dr. Kamari Parada not drive a car, operate heavy machinery or dangerous equipment for 24 hours. * Activity as tolerated; rest for the remainder of the day. * Resume pre-block medications including those for your family doctor. * Do not drink alcoholic beverages for 24 hours. Alcohol and the medications you have received may interact and cause an adverse reaction. * You may feel better this evening and worse tomorrow, as the numbing medications wears off and the steroid has yet to begin to work. After 48 hrs the steroid should begin to release bringing you relief. * You may shower this evening and remove any bandages. * Avoid hot tubs and heating pads for 24 hours. You may use cold packs on the procedure site as tolerated for the first 24 hours. * If a headache develops, drink plenty of fluids and rest.  Take over the counter medications for headache if needed. If the headache continues longer than 24 hours, call MD at the [address] [place] [address]. [phone]    * Continue taking pain medications as needed. * You may resume your regular diet if tolerated. Otherwise, start with sips of water and advance slowly. * If Diabetic: check your blood sugar three times a day for the next 3 days. If your sugar is greater than 300 call your family doctor. If your sugar is greater than 400, have someone transport you to the nearest Emergency Room. * If you experience any of the following problems, Please Call the [address] [place] [address] at [phone].         * Shortness of Breath    * Fever of 101 or higher    * Nausea / Vomiting    * Severe Headache    * Weakness or numbness in arms or legs that is not      resolving    * Prolonged increase in pain greater than 4 days      DISCHARGE SUMMARY from Nurse      PATIENT INSTRUCTIONS:    After oral sedation, for 24 hours or while taking prescription Narcotics:  · Limit your activities  · Do not drive and operate hazardous machinery  · Do not make important personal or business decisions  · Do  not drink alcoholic beverages  · If you have not urinated within 8 hours after discharge, please contact your surgeon on call. Report the following to your surgeon:  · Excessive pain, swelling, redness or odor of or around the surgical area  · Temperature over 101  · Nausea and vomiting lasting longer than 4 hours or if unable to take medications  · Any signs of decreased circulation or nerve impairment to extremity: change in color, persistent  numbness, tingling, coldness or increase pain  · Any questions            What to do at Home:  Recommended activity: Activity as tolerated, NO DRIVING FOR 12 Hours post injection          *  Please give a list of your current medications to your Primary Care Provider. *  Please update this list whenever your medications are discontinued, doses are      changed, or new medications (including over-the-counter products) are added. *  Please carry medication information at all times in case of emergency situations. These are general instructions for a healthy lifestyle:    No smoking/ No tobacco products/ Avoid exposure to second hand smoke    Surgeon General's Warning:  Quitting smoking now greatly reduces serious risk to your health. Obesity, smoking, and sedentary lifestyle greatly increases your risk for illness    A healthy diet, regular physical exercise & weight monitoring are important for maintaining a healthy lifestyle    You may be retaining fluid if you have a history of heart failure or if you experience any of the following symptoms:  Weight gain of 3 pounds or more overnight or 5 pounds in a week, increased swelling in our hands or feet or shortness of breath while lying flat in bed.   Please call your doctor as soon as you notice any of these symptoms; do not wait until your next office visit. Recognize signs and symptoms of STROKE:    F-face looks uneven    A-arms unable to move or move unevenly    S-speech slurred or non-existent    T-time-call 911 as soon as signs and symptoms begin-DO NOT go       Back to bed or wait to see if you get better-TIME IS BRAIN.

## 2018-10-30 NOTE — INTERVAL H&P NOTE
H&P Update: 
Mali Sender was seen and briefly examined. History and physical has been reviewed.   There have been no significant clinical changes since the completion of the originally dated History and Physical. 
 
Signed By: Shakira Mcgee MD   
 October 30, 2018 1:52 PM

## 2018-11-26 ENCOUNTER — OFFICE VISIT (OUTPATIENT)
Dept: ORTHOPEDIC SURGERY | Age: 83
End: 2018-11-26

## 2018-11-26 VITALS
RESPIRATION RATE: 20 BRPM | HEART RATE: 79 BPM | TEMPERATURE: 98.2 F | DIASTOLIC BLOOD PRESSURE: 61 MMHG | OXYGEN SATURATION: 98 % | SYSTOLIC BLOOD PRESSURE: 120 MMHG

## 2018-11-26 DIAGNOSIS — M48.061 SPINAL STENOSIS AT L4-L5 LEVEL: Primary | ICD-10-CM

## 2018-11-26 NOTE — PROGRESS NOTES
[address] 
[address], [address] [place], [address] [place] [address] Street Phone: [phone] Fax: [phone] Michael Nolasco : 1933 PCP: Adilia Hodge MD 
 
PROGRESS NOTE ASSESSMENT AND PLAN Diagnoses and all orders for this visit: 1. Spinal stenosis at L4-L5 level 1. Advised to continue HEP. Stretch in morning. 2. Continue medication regimen 3. Given information on preventing injury Follow-up Disposition: 
Return if symptoms worsen or fail to improve. HISTORY OF PRESENT ILLNESS Cherry Brody is a 80 y.o. male. Pt presents to the office for a f/u visit for back pain, stenosis. Pt underwent LUISA L4-5 10/20/18. Pt reports good relief from the injection. Pt denies negative side effects from injection. Pt reports pain in the morning that is better by breakfast. Pt reports increased stiffness after sitting for long periods of time. Location of pain: low back Does pain radiate into extremities: BLE tingling slight in morning/with walking Does patient have weakness: no  
Pt denies saddle paresthesias. Medications pt is on: Ibuprofen qD with relief. Gabapentin 100mg QHS PRN. Pt denies grogginess in the morning. Pt denies recent ED visits or hospitalizations. Denies persistent fevers, chills, weight changes, neurogenic bowel or bladder symptoms. Treatments patient has tried: 
Physical therapy:Yes Doing HEP: Yes Non-opioid medications: Yes Spinal injections: Yes LUISA L4-5 10/18 with benefit, previously 3/18 with 6 month benefit Spinal surgery- No.  
 
 
 reviewed. PMHx of pace maker placement. Pt enjoys playing golf and is able to continue. Pain Assessment  2018 Location of Pain Back Location Modifiers Inferior Severity of Pain 0 Quality of Pain -  
Quality of Pain Comment - Duration of Pain - Frequency of Pain - Aggravating Factors - Aggravating Factors Comment - Relieving Factors -  
 Relieving Factors Comment - Result of Injury -  
 
 
PAST MEDICAL HISTORY Past Medical History:  
Diagnosis Date  Atrial fibrillation   
 paroxysmal   CHADS score 2  (-CHF, +HTN, +AGE, -DM, -CVA)  Back pain  Dyslipidemia  GERD  Hypertrension  Nephrolithiasis  Pacemaker 11/01,   9/09    [employer]  
 Sick sinus syndrome Past Surgical History:  
Procedure Laterality Date  CARDIAC SURG PROCEDURE UNLIST Pacemaker  HX APPENDECTOMY    
 in his early 19's  HX CATARACT REMOVAL    
 1/07  bilateral  
 HX PACEMAKER Boni Garcia MEDICATIONS Current Outpatient Medications Medication Sig Dispense Refill  ibuprofen 100 mg tablet Take 100 mg by mouth every six (6) hours as needed for Pain.  metoprolol succinate (TOPROL-XL) 50 mg XL tablet TAKE ONE TABLET BY MOUTH ONCE DAILY 30 Tab 11  
 budesonide (RHINOCORT AQUA) 32 mcg/actuation nasal spray 2 Sprays by Both Nostrils route daily.  gabapentin (NEURONTIN) 100 mg capsule Take 1 tab PO QHS PRN for pain  Indications: NEUROPATHIC PAIN 30 Cap 2 ALLERGIES Allergies Allergen Reactions  Lisinopril Other (comments) Irregular heart rate  Other Plant, Animal, Environmental Other (comments) Grass, dust, and maple trees causes congestion SOCIAL HISTORY Social History Socioeconomic History  Marital status:  Spouse name: Not on file  Number of children: Not on file  Years of education: Not on file  Highest education level: Not on file Social Needs  Financial resource strain: Not on file  Food insecurity - worry: Not on file  Food insecurity - inability: Not on file  Transportation needs - medical: Not on file  Transportation needs - non-medical: Not on file Occupational History  Not on file Tobacco Use  Smoking status: Never Smoker  Smokeless tobacco: Never Used Substance and Sexual Activity  Alcohol use:  Yes  
 Alcohol/week: 2.5 oz Types: 5 Glasses of wine per week  Drug use: No  
 Sexual activity: Not on file Other Topics Concern  Not on file Social History Narrative  Not on file FAMILY HISTORY Family History Problem Relation Age of Onset  Other Father 39  
      from intestinal blockage in [facility]  Stroke Mother 68 REVIEW OF SYSTEMS Review of Systems Constitutional: Negative for chills, fever and weight loss. Respiratory: Negative for shortness of breath. Cardiovascular: Negative for chest pain. Gastrointestinal: Negative for constipation. Negative for fecal incontinence Genitourinary: Negative for dysuria. Negative for urinary incontinence Musculoskeletal:  
     Per HPI Skin: Negative for rash. Neurological: Positive for tingling. Negative for dizziness, tremors, focal weakness and headaches. Endo/Heme/Allergies: Does not bruise/bleed easily. Psychiatric/Behavioral: The patient does not have insomnia. PHYSICAL EXAMINATION Visit Vitals /61 Pulse 79 Temp 98.2 °F (36.8 °C) (Oral) Resp 20 SpO2 98% Accompanied by self. Constitutional:  Well developed, well nourished, in no acute distress. Psychiatric: Affect and mood are appropriate. Integumentary: No rashes or abrasions noted on exposed areas. Cardiovascular/Peripheral Vascular: Intact l pulses. No peripheral edema is noted. Lymphatic:  No evidence of lymphedema. No cervical lymphadenopathy. SPINE/MUSCULOSKELETAL EXAM 
 
 
Lumbar spine: No rash, ecchymosis, or gross obliquity. No fasciculations. No focal atrophy is noted. Tenderness to palpation none. No tenderness to palpation at the sciatic notch. SI joints non-tender. Trochanters non tender. Sensation grossly intact to light touch. MOTOR:   
 
 Hip Flex  Quads Hamstrings Ankle DF EHL Ankle PF Right +4/5 +4/5 +4/5 +4/5 +4/5 +4/5 Left +4/5 +4/5 +4/5 +4/5 +4/5 +4/5 Straight Leg raise negative. Ambulation without assistive device. FWB. Written by Emi Yen, as dictated by Madeleine Yu MD. 
 
I, Dr. Madeleine Yu MD, confirm that all documentation is accurate. Mr. Elli Geller may have a reminder for a \"due or due soon\" health maintenance. I have asked that he contact his primary care provider for follow-up on this health maintenance.

## 2018-11-26 NOTE — PATIENT INSTRUCTIONS
Back Care and Preventing Injuries: Care Instructions Your Care Instructions You can hurt your back doing many everyday activities: lifting a heavy box, bending down to garden, exercising at the gym, and even getting out of bed. But you can keep your back strong and healthy by doing some exercises. You also can follow a few tips for sitting, sleeping, and lifting to avoid hurting your back again. Talk to your doctor before you start an exercise program. Ask for help if you want to learn more about keeping your back healthy. Follow-up care is a key part of your treatment and safety. Be sure to make and go to all appointments, and call your doctor if you are having problems. It's also a good idea to know your test results and keep a list of the medicines you take. How can you care for yourself at home? · Stay at a healthy weight to avoid strain on your lower back. · Do not smoke. Smoking increases the risk of osteoporosis, which weakens the spine. If you need help quitting, talk to your doctor about stop-smoking programs and medicines. These can increase your chances of quitting for good. · Make sure you sleep in a position that maintains your back's normal curves and on a mattress that feels comfortable. Sleep on your side with a pillow between your knees, or sleep on your back with a pillow under your knees. These positions can reduce strain on your back. · When you get out of bed, lie on your side and bend both knees. Drop your feet over the edge of the bed as you push up with both arms. Scoot to the edge of the bed. Make sure your feet are in line with your rear end (buttocks), and then stand up. · If you must stand for a long time, put one foot on a stool, ledge, or box. Exercise to strengthen your back and other muscles · Get at least 30 minutes of exercise on most days of the week. Walking is a good choice.  You also may want to do other activities, such as running, swimming, cycling, or playing tennis or team sports. · Stretch your back muscles. Here are few exercises to try: 
? Lie on your back with your knees bent and your feet flat on the floor. Gently pull one bent knee to your chest. Put that foot back on the floor, and then pull the other knee to your chest. Hold for 15 to 30 seconds. Repeat 2 to 4 times. ? Do pelvic tilts. Lie on your back with your knees bent. Tighten your stomach muscles. Pull your belly button (navel) in and up toward your ribs. You should feel like your back is pressing to the floor and your hips and pelvis are slightly lifting off the floor. Hold for 6 seconds while breathing smoothly. · Keep your core muscles strong. The muscles of your back, belly (abdomen), and buttocks support your spine. ? Pull in your belly, and imagine pulling your navel toward your spine. Hold this for 6 seconds, then relax. Remember to keep breathing normally as you tense your muscles. ? Do curl-ups. Always do them with your knees bent. Keep your low back on the floor, and curl your shoulders toward your knees using a smooth, slow motion. Keep your arms folded across your chest. If this bothers your neck, try putting your hands behind your neck (not your head), with your elbows spread apart. ? Lie on your back with your knees bent and your feet flat on the floor. Tighten your belly muscles, and then push with your feet and raise your buttocks up a few inches. Hold this position 6 seconds as you continue to breathe normally, then lower yourself slowly to the floor. Repeat 8 to 12 times. ? If you like group exercise, try Pilates or yoga. These classes have poses that strengthen the core muscles. Protect your back when you sit · Place a small pillow, a rolled-up towel, or a lumbar roll in the curve of your back if you need extra support.  
· Sit in a chair that is low enough to let you place both feet flat on the floor with both knees nearly level with your hips. If your chair or desk is too high, use a foot rest to raise your knees. · When driving, keep your knees nearly level with your hips. Sit straight, and drive with both hands on the steering wheel. Your arms should be in a slightly bent position. · Try a kneeling chair, which helps tilt your hips forward. This takes pressure off your lower back. · Try sitting on an exercise ball. It can rock from side to side, which helps keep your back loose. Lift properly · Squat down, bending at the hips and knees only. If you need to, put one knee to the floor and extend your other knee in front of you, bent at a right angle (half kneeling). · Press your chest straight forward. This helps keep your upper back straight while keeping a slight arch in your low back. · Hold the load as close to your body as possible, at the level of your navel. · Use your feet to change direction, taking small steps. · Lead with your hips as you change direction. Keep your shoulders in line with your hips as you move. Do not twist your body. · Set down your load carefully, squatting with your knees and hips only. When should you call for help? Watch closely for changes in your health, and be sure to contact your doctor if you have any problems. Where can you learn more? Go to http://kerline-bernabe.info/. Enter S810 in the search box to learn more about \"Back Care and Preventing Injuries: Care Instructions. \" Current as of: November 29, 2017 Content Version: 11.8 © 5356-7702 [employer]. Care instructions adapted under license by [employer] (which disclaims liability or warranty for this information). If you have questions about a medical condition or this instruction, always ask your healthcare professional. [address] any warranty or liability for your use of this information.

## 2019-02-18 ENCOUNTER — HOSPITAL ENCOUNTER (OUTPATIENT)
Dept: LAB | Age: 84
Discharge: HOME | End: 2019-02-18
Payer: MEDICARE

## 2019-02-18 DIAGNOSIS — E78.5 DYSLIPIDEMIA: ICD-10-CM

## 2019-02-18 DIAGNOSIS — I11.9 BENIGN HYPERTENSIVE HEART DISEASE WITHOUT HEART FAILURE: ICD-10-CM

## 2019-02-18 LAB
ALBUMIN SERPL-MCNC: 3.5 G/DL (ref 3.4–5)
ALBUMIN/GLOB SERPL: 1.1 {RATIO} (ref 0.8–1.7)
ALP SERPL-CCNC: 69 U/L (ref 45–117)
ALT SERPL-CCNC: 16 U/L (ref 16–61)
ANION GAP SERPL CALC-SCNC: 6 MMOL/L (ref 3–18)
AST SERPL-CCNC: 19 U/L (ref 15–37)
BILIRUB SERPL-MCNC: 0.7 MG/DL (ref 0.2–1)
BUN SERPL-MCNC: 14 MG/DL (ref 7–18)
BUN/CREAT SERPL: 14 (ref 12–20)
CALCIUM SERPL-MCNC: 8.5 MG/DL (ref 8.5–10.1)
CHLORIDE SERPL-SCNC: 106 MMOL/L (ref 100–108)
CHOLEST SERPL-MCNC: 152 MG/DL
CO2 SERPL-SCNC: 28 MMOL/L (ref 21–32)
CREAT SERPL-MCNC: 0.98 MG/DL (ref 0.6–1.3)
GLOBULIN SER CALC-MCNC: 3.2 G/DL (ref 2–4)
GLUCOSE SERPL-MCNC: 99 MG/DL (ref 74–99)
HDLC SERPL-MCNC: 54 MG/DL (ref 40–60)
HDLC SERPL: 2.8 {RATIO} (ref 0–5)
LDLC SERPL CALC-MCNC: 85.4 MG/DL (ref 0–100)
LIPID PROFILE,FLP: NORMAL
POTASSIUM SERPL-SCNC: 4.1 MMOL/L (ref 3.5–5.5)
PROT SERPL-MCNC: 6.7 G/DL (ref 6.4–8.2)
SODIUM SERPL-SCNC: 140 MMOL/L (ref 136–145)
TRIGL SERPL-MCNC: 63 MG/DL (ref ?–150)
VLDLC SERPL CALC-MCNC: 12.6 MG/DL

## 2019-02-18 PROCEDURE — 36415 COLL VENOUS BLD VENIPUNCTURE: CPT

## 2019-02-18 PROCEDURE — 80053 COMPREHEN METABOLIC PANEL: CPT

## 2019-02-18 PROCEDURE — 80061 LIPID PANEL: CPT

## 2019-02-25 ENCOUNTER — OFFICE VISIT (OUTPATIENT)
Dept: FAMILY MEDICINE CLINIC | Age: 84
End: 2019-02-25

## 2019-02-25 VITALS
WEIGHT: 171.2 LBS | DIASTOLIC BLOOD PRESSURE: 71 MMHG | HEIGHT: 68 IN | SYSTOLIC BLOOD PRESSURE: 130 MMHG | TEMPERATURE: 98.2 F | RESPIRATION RATE: 16 BRPM | OXYGEN SATURATION: 97 % | BODY MASS INDEX: 25.94 KG/M2 | HEART RATE: 74 BPM

## 2019-02-25 DIAGNOSIS — E78.5 DYSLIPIDEMIA: ICD-10-CM

## 2019-02-25 DIAGNOSIS — I11.9 BENIGN HYPERTENSIVE HEART DISEASE WITHOUT HEART FAILURE: Primary | ICD-10-CM

## 2019-02-25 DIAGNOSIS — I48.0 PAROXYSMAL ATRIAL FIBRILLATION (HCC): ICD-10-CM

## 2019-02-25 NOTE — PROGRESS NOTES
Patient is here for 6 month follow up. 1. Have you been to the ER, urgent care clinic since your last visit? Hospitalized since your last visit? No 
 
2. Have you seen or consulted any other health care providers outside of the [address] [place] [address] since your last visit? Include any pap smears or colon screening. No  
 
Patient reports that he had his pacemaker checked by Cardiologist at [facility].

## 2019-02-25 NOTE — PATIENT INSTRUCTIONS

## 2019-02-25 NOTE — PROGRESS NOTES
Subjective: Chief Complaint The patient presents for follow up of hypertension and high cholesterol. Afib HPI Mikel Brower is a 80 y.o. male seen for follow up of hyperlipidemia. Healso has hypertension. Hypertension well controlled, no significant medication side effects noted, on Toprol, hyperlipidemia fairly well controlled at age 80. Pt is followed by cardiology. Allergies are stable on Zyrtec and Nasacort prn and allergy shots. Diet and Lifestyle: generally follows a low fat low cholesterol diet, exercises regularly Home BP Monitoring: is not measured at home. Other symptoms and concerns: pt's back pain is much improved with doing core exercises daily. Pt is followed by cardiology for his afib and has a pacemake which will need a new battery within about a year. Current Outpatient Medications Medication Sig  ibuprofen 100 mg tablet Take 100 mg by mouth every six (6) hours as needed for Pain.  metoprolol succinate (TOPROL-XL) 50 mg XL tablet TAKE ONE TABLET BY MOUTH ONCE DAILY  budesonide (RHINOCORT AQUA) 32 mcg/actuation nasal spray 2 Sprays by Both Nostrils route daily.  gabapentin (NEURONTIN) 100 mg capsule Take 1 tab PO QHS PRN for pain  Indications: NEUROPATHIC PAIN No current facility-administered medications for this visit. Review of Systems Respiratory: negative for dyspnea on exertion Cardiovascular: negative for chest pain Objective:  
 
Visit Vitals /71 (BP 1 Location: Left arm, BP Patient Position: Sitting) Pulse 74 Temp 98.2 °F (36.8 °C) (Oral) Resp 16 Ht 5' 8\" (1.727 m) Wt 171 lb 3.2 oz (77.7 kg) SpO2 97% BMI 26.03 kg/m² Eyes - pupils equal and reactive, extraocular eye movements intact Neck - supple, no significant adenopathy, carotids upstroke normal bilaterally, no bruits Chest - clear to auscultation, no wheezes, rales or rhonchi, symmetric air entry Heart - normal rate, regular rhythm, normal S1, S2, 3/5 systolic murmur at RSB Extremities - peripheral pulses normal, no pedal edema, no clubbing or cyanosis Labs:  
Lab Results Component Value Date/Time Cholesterol, total 152 02/18/2019 08:00 AM  
 HDL Cholesterol 54 02/18/2019 08:00 AM  
 LDL, calculated 85.4 02/18/2019 08:00 AM  
 Triglyceride 63 02/18/2019 08:00 AM  
 CHOL/HDL Ratio 2.8 02/18/2019 08:00 AM  
 
Lab Results Component Value Date/Time ALT (SGPT) 16 02/18/2019 08:00 AM  
 AST (SGOT) 19 02/18/2019 08:00 AM  
 Alk. phosphatase 69 02/18/2019 08:00 AM  
 Bilirubin, total 0.7 02/18/2019 08:00 AM  
 
Lab Results Component Value Date/Time GFR est AA >60 02/18/2019 08:00 AM  
 GFR est non-AA >60 02/18/2019 08:00 AM  
 Creatinine 0.98 02/18/2019 08:00 AM  
 BUN 14 02/18/2019 08:00 AM  
 Sodium 140 02/18/2019 08:00 AM  
 Potassium 4.1 02/18/2019 08:00 AM  
 Chloride 106 02/18/2019 08:00 AM  
 CO2 28 02/18/2019 08:00 AM  
  
 
 
 
 
Assessment / Plan Hypertension well controlled, on Toprol Hyperlipidemia fairly well controlled with diet ICD-10-CM ICD-9-CM 1. Hypertrension T83.6 430.56 METABOLIC PANEL, COMPREHENSIVE 2. Dyslipidemia E78.5 272.4 LIPID PANEL 3. Atrial fibrillation I48.0 427.31 Well controlled on Toprol and with Pacemaker. Pt followed by Cardiology Low cholesterol diet, weight control and daily exercise discussed. Follow-up Disposition: 
Return in about 6 months (around 8/25/2019) for OV, and Medicare Wellness Visit, labs 1 week before. Reviewed plan of care. Patient has provided input and agrees with goals.

## 2019-05-16 ENCOUNTER — OFFICE VISIT (OUTPATIENT)
Dept: ORTHOPEDIC SURGERY | Age: 84
End: 2019-05-16

## 2019-05-16 VITALS
DIASTOLIC BLOOD PRESSURE: 76 MMHG | BODY MASS INDEX: 26.07 KG/M2 | TEMPERATURE: 97.7 F | WEIGHT: 172 LBS | SYSTOLIC BLOOD PRESSURE: 138 MMHG | HEIGHT: 68 IN | HEART RATE: 83 BPM | RESPIRATION RATE: 16 BRPM | OXYGEN SATURATION: 97 %

## 2019-05-16 DIAGNOSIS — M43.16 SPONDYLOLISTHESIS AT L4-L5 LEVEL: ICD-10-CM

## 2019-05-16 DIAGNOSIS — M54.31 RIGHT SCIATIC NERVE PAIN: ICD-10-CM

## 2019-05-16 DIAGNOSIS — M48.061 SPINAL STENOSIS AT L4-L5 LEVEL: Primary | ICD-10-CM

## 2019-05-16 NOTE — PROGRESS NOTES
Gaurang Batista presents today for Chief Complaint Patient presents with  Back Pain Lumbar  Leg Pain Right  Follow-up Is someone accompanying this pt? NO Is the patient using any DME equipment during OV? NO Depression Screening: 
3 most recent PHQ Screens 5/16/2019 Little interest or pleasure in doing things Not at all Feeling down, depressed, irritable, or hopeless Not at all Total Score PHQ 2 0 Learning Assessment: 
Learning Assessment 3/24/2014 PRIMARY LEARNER Patient HIGHEST LEVEL OF EDUCATION - PRIMARY LEARNER  GRADUATED HIGH SCHOOL OR GED  
BARRIERS PRIMARY LEARNER NONE  
CO-LEARNER CAREGIVER No  
PRIMARY LANGUAGE ENGLISH  
LEARNER PREFERENCE PRIMARY READING  
ANSWERED BY patient RELATIONSHIP SELF Abuse Screening: 
Abuse Screening Questionnaire 8/23/2018 Do you ever feel afraid of your partner? Severiano Organ Are you in a relationship with someone who physically or mentally threatens you? Severiano Organ Is it safe for you to go home? Johnathan Au Fall Risk Fall Risk Assessment, last 12 mths 5/16/2019 Able to walk? Yes Fall in past 12 months? Yes Fall with injury? Yes  
Number of falls in past 12 months 2 Fall Risk Score 3 Coordination of Care: 1. Have you been to the ER, urgent care clinic since your last visit? NO  Hospitalized since your last visit? NO 
 
2. Have you seen or consulted any other health care providers outside of the [address] [place] [address] since your last visit? NO Include any pap smears or colon screening. NO Last  Checked 5/16/19

## 2019-05-16 NOTE — PROGRESS NOTES
[facility] [address] 
[address], [address] Phone: [phone] Fax: [phone] Kenneth Cedeno : 1933 PCP: Adelina Camara MD 
 
PROGRESS NOTE ASSESSMENT AND PLAN Diagnoses and all orders for this visit: 1. Spinal stenosis at L4-L5 level -     AMB POC XRAY, SPINE, LUMBOSACRAL; 4+ 
-     CT SPINE LUMB WO CONT; Future 2. Spondylolisthesis at L4-L5 level -     AMB POC XRAY, SPINE, LUMBOSACRAL; 4+ 
-     CT SPINE LUMB WO CONT; Future 3. Right sciatic nerve pain 
-     CT SPINE LUMB WO CONT; Future 1. Advised to continue HEP. 2. Discussed life style modification, PT, medication, spinal injection, and surgery as treatment options 3. Delay LUISA L4-5 to after CT 4. CT lumbar spine - increased RLE numbness, tingling. 2 falls. Known stenosis. 5. Given information on deciding about surgery F/U after CT HISTORY OF PRESENT ILLNESS Alfredo Alvarez is a 80 y.o. male. Pt presents to the office for a f/u visit for stenosis. Pt states he was doing well until 2019. He c/o pain and numbness and weakness in RLE, which has caused him to fall. He states the 'weakness' is worse than before and is more like clumsiness. He states walking longer aggravates his numbness and clumsiness. He denies significant pain at night. R leg giving way, has fallen twice in the past month. Generally active, plays golf. Location of pain: low back Does pain radiate into extremities: RLE numbness - feels clumsy Does patient have weakness: RLE clumsiness, gave out - no control Pt denies saddle paresthesias. Medications pt is on: Ibuprofen BID with relief. Tylenol ES PRN. Gabapentin 100mg QHS PRN, not used. Pt denies grogginess in the morning. Pt denies recent ED visits or hospitalizations. Denies persistent fevers, chills, weight changes, neurogenic bowel or bladder symptoms.   
 
Treatments patient has tried: 
Physical therapy:Yes 
 Doing HEP: Yes Non-opioid medications: Yes Spinal injections: Yes LUISA L4-5 10/18 with benefit, previously 3/18 with 6 month benefit Spinal surgery- No.  
 Last L CT 2013: stenosis and listhesis L4-5. 
  
 reviewed. PMHx of pace maker placement. Pt enjoys playing golf and is able to continue. Pain Assessment  5/16/2019 Location of Pain Back;Leg Location Modifiers Right Severity of Pain 3 Quality of Pain (No Data) Quality of Pain Comment numbness, weakness Duration of Pain - Frequency of Pain Constant Aggravating Factors Standing;Walking Aggravating Factors Comment - Relieving Factors NSAID Relieving Factors Comment tylenol Result of Injury -  
 
 
PAST MEDICAL HISTORY Past Medical History:  
Diagnosis Date  Atrial fibrillation   
 paroxysmal   CHADS score 2  (-CHF, +HTN, +AGE, -DM, -CVA)  Back pain  Dyslipidemia  GERD  Hypertrension  Nephrolithiasis  Pacemaker 11/01,   9/09    [employer]  
 Sick sinus syndrome Past Surgical History:  
Procedure Laterality Date  CARDIAC SURG PROCEDURE UNLIST Pacemaker  HX APPENDECTOMY    
 in his early 19's  HX CATARACT REMOVAL    
 1/07  bilateral  
 HX PACEMAKER Aric Broach MEDICATIONS Current Outpatient Medications Medication Sig Dispense Refill  ibuprofen 100 mg tablet Take 100 mg by mouth every six (6) hours as needed for Pain.  metoprolol succinate (TOPROL-XL) 50 mg XL tablet TAKE ONE TABLET BY MOUTH ONCE DAILY (Patient taking differently: 25 mg. TAKE ONE TABLET BY MOUTH ONCE DAILY) 30 Tab 11  
 budesonide (RHINOCORT AQUA) 32 mcg/actuation nasal spray 2 Sprays by Both Nostrils route daily.  gabapentin (NEURONTIN) 100 mg capsule Take 1 tab PO QHS PRN for pain  Indications: NEUROPATHIC PAIN 30 Cap 2 ALLERGIES Allergies Allergen Reactions  Lisinopril Other (comments) Irregular heart rate  Other Plant, Animal, Environmental Other (comments) Grass, dust, and maple trees causes congestion SOCIAL HISTORY Social History Socioeconomic History  Marital status:  Spouse name: Not on file  Number of children: Not on file  Years of education: Not on file  Highest education level: Not on file Occupational History  Not on file Social Needs  Financial resource strain: Not on file  Food insecurity:  
  Worry: Not on file Inability: Not on file  Transportation needs:  
  Medical: Not on file Non-medical: Not on file Tobacco Use  Smoking status: Never Smoker  Smokeless tobacco: Never Used Substance and Sexual Activity  Alcohol use: Yes Alcohol/week: 2.5 oz Types: 5 Glasses of wine per week  Drug use: No  
 Sexual activity: Not on file Lifestyle  Physical activity:  
  Days per week: Not on file Minutes per session: Not on file  Stress: Not on file Relationships  Social connections:  
  Talks on phone: Not on file Gets together: Not on file Attends Latter-day service: Not on file Active member of club or organization: Not on file Attends meetings of clubs or organizations: Not on file Relationship status: Not on file  Intimate partner violence:  
  Fear of current or ex partner: Not on file Emotionally abused: Not on file Physically abused: Not on file Forced sexual activity: Not on file Other Topics Concern  Not on file Social History Narrative  Not on file FAMILY HISTORY Family History Problem Relation Age of Onset  Other Father 39  
      from intestinal blockage in [facility]  Stroke Mother 68 REVIEW OF SYSTEMS Review of Systems Constitutional: Negative for chills, fever and weight loss. Respiratory: Negative for shortness of breath. Cardiovascular: Negative for chest pain. Gastrointestinal: Negative for constipation. Negative for fecal incontinence Genitourinary: Negative for dysuria. Negative for urinary incontinence Musculoskeletal: Positive for falls. Per HPI Skin: Negative for rash. Neurological: Positive for tingling. Negative for dizziness, tremors, focal weakness and headaches. Endo/Heme/Allergies: Does not bruise/bleed easily. Psychiatric/Behavioral: The patient does not have insomnia. PHYSICAL EXAMINATION Visit Vitals /76 Pulse 83 Temp 97.7 °F (36.5 °C) Resp 16 Ht 5' 8\" (1.727 m) Wt 172 lb (78 kg) SpO2 97% BMI 26.15 kg/m² Accompanied by self. Constitutional:  Well developed, well nourished, in no acute distress. Psychiatric: Affect and mood are appropriate. Integumentary: No rashes or abrasions noted on exposed areas. Cardiovascular/Peripheral Vascular: Intact l pulses. No peripheral edema is noted BLE. Lymphatic:  No evidence of lymphedema. No cervical lymphadenopathy. SPINE/MUSCULOSKELETAL EXAM 
 
 
Lumbar spine: No rash, ecchymosis, or gross obliquity. No fasciculations. No focal atrophy is noted. Tenderness to palpation R L4-5. No tenderness to palpation at the sciatic notch. SI joints non-tender. Trochanters non tender. Sensation grossly intact to light touch. MOTOR:   
 
 Hip Flex  Quads Hamstrings Ankle DF EHL Ankle PF Right +4/5 +4/5 +4/5 +4/5 +4/5 +4/5 Left +4/5 +4/5 +4/5 +4/5 +4/5 +4/5 Straight Leg raise negative. Intact Heel rise. Intact Toe rise. Ambulation without assistive device. FWB. RADIOGRAPHS Lumbar spine xray films reviewed: 
1) disc space narrowing L4-5 
2) slight listhesis L4-5 3) some motion on flexion, extension Written by Luci Flores, as dictated by Christine Rodríguez MD. 
 
I, Dr. Christine Rodríguez MD, confirm that all documentation is accurate. Mr. German Rodriguez may have a reminder for a \"due or due soon\" health maintenance.  I have asked that he contact his primary care provider for follow-up on this health maintenance.

## 2019-05-16 NOTE — PATIENT INSTRUCTIONS
Deciding About Surgery for Lumbar Spinal Stenosis How can you decide about surgery for lumbar spinal stenosis? What is lumbar spinal stenosis? Lumbar spinal stenosis is the narrowing of the spinal canal in the lower back. This occurs when bone and other tissues grow inside the openings in the spinal bones. This can squeeze the nerves that branch out from the spinal cord. The squeezing can cause pain, numbness, or weakness. It happens most often in the legs, feet, or buttocks. You may choose to have surgery to ease your symptoms. Or you may try other treatments instead. These include medicines, exercise, and physical therapy. What are key points about this decision? · If your symptoms are mild to moderate, then medicine, physical therapy, and exercise may be all you need. · You may want surgery if you have tried other treatment for a while and your symptoms are still so bad that you can't do your normal activities. · Your symptoms may come back after a few years. You may need surgery again. · Surgery will most likely help leg pain. But it may not help back pain as much. Why might you choose to have surgery? · Surgery can relieve pain. It can also improve how well you can walk. · You have tried other treatment for a while and your symptoms are still so bad that you can't do your normal activities. · Without surgery, your symptoms may still bother you. If symptoms are very painful, they most often will not improve on their own. · Your doctor may advise surgery if you can't control your bladder or bowels as well as you used to. Surgery is also an option if you notice sudden changes in how well you can walk or you are more clumsy than before. Why might you choose not to have surgery? · You may try other treatments to help your symptoms. These include medicine, exercise, and physical therapy. These other treatments work best for people with mild to moderate symptoms. · Risks from surgery include nerve injury and tissue tears. And you could have chronic pain, trouble passing urine, and a spine that is not stable. · All surgery has some risks. These include bleeding, infection, and risks from anesthesia. · You may not be able to return to all of your normal activities for many months. · Your symptoms may come back in a few years. You may need surgery again. Your decision Thinking about the facts and your feelings can help you make a decision that is right for you. Be sure you understand the benefits and risks of your options, and think about what else you need to do before you make the decision. Where can you learn more? Go to http://kerline-bernabe.info/. Enter J808 in the search box to learn more about \"Deciding About Surgery for Lumbar Spinal Stenosis. \" Current as of: September 20, 2018 Content Version: 11.9 © 6787-3445 [employer]. Care instructions adapted under license by [employer] (which disclaims liability or warranty for this information). If you have questions about a medical condition or this instruction, always ask your healthcare professional. [address] any warranty or liability for your use of this information.

## 2019-05-21 ENCOUNTER — HOSPITAL ENCOUNTER (OUTPATIENT)
Dept: CT IMAGING | Age: 84
Discharge: HOME | End: 2019-05-21
Attending: PHYSICAL MEDICINE & REHABILITATION
Payer: MEDICARE

## 2019-05-21 DIAGNOSIS — M48.061 SPINAL STENOSIS AT L4-L5 LEVEL: ICD-10-CM

## 2019-05-21 DIAGNOSIS — M54.31 RIGHT SCIATIC NERVE PAIN: ICD-10-CM

## 2019-05-21 DIAGNOSIS — M43.16 SPONDYLOLISTHESIS AT L4-L5 LEVEL: ICD-10-CM

## 2019-05-21 PROCEDURE — 72131 CT LUMBAR SPINE W/O DYE: CPT

## 2019-06-03 ENCOUNTER — OFFICE VISIT (OUTPATIENT)
Dept: ORTHOPEDIC SURGERY | Age: 84
End: 2019-06-03

## 2019-06-03 VITALS
RESPIRATION RATE: 17 BRPM | OXYGEN SATURATION: 96 % | SYSTOLIC BLOOD PRESSURE: 137 MMHG | TEMPERATURE: 97.8 F | BODY MASS INDEX: 25.94 KG/M2 | HEIGHT: 68 IN | WEIGHT: 171.2 LBS | HEART RATE: 83 BPM | DIASTOLIC BLOOD PRESSURE: 80 MMHG

## 2019-06-03 DIAGNOSIS — M43.16 SPONDYLOLISTHESIS OF LUMBAR REGION: Primary | ICD-10-CM

## 2019-06-03 DIAGNOSIS — M54.16 LUMBAR NEURITIS: ICD-10-CM

## 2019-06-03 RX ORDER — GABAPENTIN 100 MG/1
CAPSULE ORAL
Qty: 60 CAP | Refills: 2 | Status: SHIPPED | OUTPATIENT
Start: 2019-06-03 | End: 2019-11-22 | Stop reason: SDUPTHER

## 2019-06-03 NOTE — PROGRESS NOTES
[facility]orm[facility]  [address], [address]  [place] [address]  Phone: [phone]  Fax: [phone]        Marizol Salas  : 1933  PCP: Lonnie Yuan MD    PROGRESS NOTE      ASSESSMENT AND PLAN    Diagnoses and all orders for this visit:    1. Spondylolisthesis of lumbar region    2. Lumbar neuritis    Other orders  -     gabapentin (NEURONTIN) 100 mg capsule; Take 1 tab PO every day-BID PRN for nerve pain . 1. Advised to continue HEP. Ankle pumps. 2. Advised to wear wallet in front pocket or left side  3. May take Gabapentin 100mg BID PRN  4. Discussed life style modification, PT, medication, spinal injection, and surgery as treatment options. Currently quite functional.  Would hold off on further ESIs. 5. Given information on low back exercises    F/U PRN      HISTORY OF PRESENT ILLNESS  Gaurang Batista is a 80 y.o. male. Last visit pt was sent to have a [facility] spine CT. Images reviewed with the pt. Pt states he continues to stay active, but walking becomes difficult at times because he loses control of RLE. He states the numbness is annoying more than painful. He complains he cannot manage the numbness yet, only the pain. He notes the numbness is aggravated by walking 100 yards, anything less and it does not bother him. He notes the pain comes mostly with standing suddenly. Location of pain: low back  Does pain radiate into extremities: RLE numbness and pain  Does patient have weakness: RLE wobbly with walking   Pt denies saddle paresthesias. Medications pt is on: Ibuprofen BID PRN with benefit for pain, none for numbness. Gabapentin 100mg PRN, not taking currently. Pt denies recent ED visits or hospitalizations. Denies persistent fevers, chills, weight changes, neurogenic bowel or bladder symptoms.      Treatments patient has tried:  Physical therapy:Yes  Doing HEP: Yes  Non-opioid medications: Yes  Spinal injections: Yes LUISA L4-5 10/18 with benefit, previously 3/18 with 6 month benefit  Spinal surgery- No.   Last L CT 2019: listhesis and FA L4-5, lat recess stenosis     reviewed.  PMHx of pace maker placement. Pt enjoys playing golf and is able to continue. 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 Pain Assessment  6/3/2019   Location of Pain Back   Location Modifiers -   Severity of Pain 3   Quality of Pain Aching   Quality of Pain Comment -   Duration of Pain -   Frequency of Pain Constant   Aggravating Factors Walking;Standing   Aggravating Factors Comment -   Relieving Factors NSAID   Relieving Factors Comment -   Result of Injury -         CT Results (most recent):  Results from Hospital Encounter encounter on 05/21/19   CT SPINE LUMB WO CONT    Narrative EXAM: CT scan of the lumbar spine without IV contrast.    CLINICAL HISTORY/INDICATION: History of spinal stenosis and a spondylolisthesis  at L4-5. Javier Perdomo COMPARISON: CT scan of the lumbar spine dated October 24, 2013. Javier Perdomo TECHNIQUE: All CT scans at this facility are performed using dose optimization  technique as appropriate to a performed exam, to include automated exposure  control, adjustment of the mA and/or kV according to patient's size (including  appropriate matching for site-specific examinations), or use of iterative  reconstruction technique. Javier Perdomo FINDINGS:    A CT scan is performed on the lumbar spine. Axial images from the lower thoracic  vertebra to the sacrum are obtained with reconstruction in sagittal and coronal  images. There is persistent calcified atherosclerotic plaques in the abdominal  aorta. Disc space narrowing at L4-5 with progression of the anterolisthesis of  L4 on L5 is noted. The mean anterior posterior diameter of the canal is 11.3 mm. The axial images show bilateral facet arthropathy at L2-3, L3-4, L4-5 and L5-S1.   Joint space narrowing with subchondral sclerosis and hypertrophic bone formation  is noted along with osteophyte formation compromising the neural foramina. The  changes are most severe at the L4-5 level bilaterally and the L5-S1 level on the  right. Mild bilateral changes are noted at L3-4 and L2-3. The cephalocaudal  height of the vertebral bodies is maintained. .      Impression Progressive anterolisthesis of L4 on L5 when compared with the previous study. Anterior posterior diameter of the canal at L4-5 measures 11.3 mm. Bilateral facet arthropathy from L2 to L5 with the most severe changes at L4-5  bilaterally and L5-S1 on the right. No evidence for a fracture. Devon Chin PAST MEDICAL HISTORY   Past Medical History:   Diagnosis Date    Atrial fibrillation     paroxysmal   CHADS score 2  (-CHF, +HTN, +AGE, -DM, -CVA)    Back pain     Dyslipidemia     GERD     Hypertrension     Nephrolithiasis     Pacemaker     11/01,   9/09    [employer]    Sick sinus syndrome        Past Surgical History:   Procedure Laterality Date    CARDIAC SURG PROCEDURE UNLIST      Pacemaker    HX APPENDECTOMY      in his early 19's    HX CATARACT REMOVAL      1/07  bilateral    HX PACEMAKER     . MEDICATIONS      Current Outpatient Medications   Medication Sig Dispense Refill    gabapentin (NEURONTIN) 100 mg capsule Take 1 tab PO every day-BID PRN for nerve pain . 60 Cap 2    gabapentin (NEURONTIN) 100 mg capsule Take 1 tab PO QHS PRN for pain  Indications: NEUROPATHIC PAIN 30 Cap 2    ibuprofen 100 mg tablet Take 100 mg by mouth every six (6) hours as needed for Pain.  metoprolol succinate (TOPROL-XL) 50 mg XL tablet TAKE ONE TABLET BY MOUTH ONCE DAILY (Patient taking differently: 25 mg. TAKE ONE TABLET BY MOUTH ONCE DAILY) 30 Tab 11    budesonide (RHINOCORT AQUA) 32 mcg/actuation nasal spray 2 Sprays by Both Nostrils route daily.           ALLERGIES    Allergies   Allergen Reactions    Lisinopril Other (comments)     Irregular heart rate    Other Plant, Animal, Environmental Other (comments)     Grass, dust, and maple trees causes congestion          SOCIAL HISTORY    Social History     Socioeconomic History    Marital status:      Spouse name: Not on file    Number of children: Not on file    Years of education: Not on file    Highest education level: Not on file   Occupational History    Not on file   Social Needs    Financial resource strain: Not on file    Food insecurity:     Worry: Not on file     Inability: Not on file    Transportation needs:     Medical: Not on file     Non-medical: Not on file   Tobacco Use    Smoking status: Never Smoker    Smokeless tobacco: Never Used   Substance and Sexual Activity    Alcohol use: Yes     Alcohol/week: 2.5 oz     Types: 5 Glasses of wine per week    Drug use: No    Sexual activity: Not on file   Lifestyle    Physical activity:     Days per week: Not on file     Minutes per session: Not on file    Stress: Not on file   Relationships    Social connections:     Talks on phone: Not on file     Gets together: Not on file     Attends Tenriism service: Not on file     Active member of club or organization: Not on file     Attends meetings of clubs or organizations: Not on file     Relationship status: Not on file    Intimate partner violence:     Fear of current or ex partner: Not on file     Emotionally abused: Not on file     Physically abused: Not on file     Forced sexual activity: Not on file   Other Topics Concern    Not on file   Social History Narrative    Not on file       FAMILY HISTORY    Family History   Problem Relation Age of Onset    Other Father 39         from intestinal blockage in [de-identified]    Stroke Mother 68       REVIEW OF SYSTEMS  Review of Systems   Constitutional: Negative for chills, fever and weight loss. Respiratory: Negative for shortness of breath. Cardiovascular: Negative for chest pain. Gastrointestinal: Negative for constipation. Negative for fecal incontinence   Genitourinary: Negative for dysuria. Negative for urinary incontinence   Musculoskeletal:        Per HPI   Skin: Negative for rash. Neurological: Positive for tingling. Negative for dizziness, tremors, focal weakness and headaches. Endo/Heme/Allergies: Does not bruise/bleed easily. Psychiatric/Behavioral: The patient does not have insomnia. PHYSICAL EXAMINATION  Visit Vitals  /80   Pulse 83   Temp 97.8 °F (36.6 °C) (Oral)   Resp 17   Ht 5' 8\" (1.727 m)   Wt 171 lb 3.2 oz (77.7 kg)   SpO2 96%   BMI 26.03 kg/m²         Accompanied by self. Constitutional:  Well developed, well nourished, in no acute distress. Psychiatric: Affect and mood are appropriate. Integumentary: No rashes or abrasions noted on exposed areas. Cardiovascular/Peripheral Vascular: Intact l pulses. No peripheral edema is noted BLE. Lymphatic:  No evidence of lymphedema. No cervical lymphadenopathy. SPINE/MUSCULOSKELETAL EXAM      Lumbar spine:  No rash, ecchymosis, or gross obliquity. No fasciculations. No focal atrophy is noted. Tenderness to palpation R L4-5, R sciatic notch. SI joints non-tender. Trochanters non tender. Sensation grossly intact to light touch. MOTOR:       Hip Flex  Quads Hamstrings Ankle DF EHL Ankle PF   Right +4/5 +4/5 +4/5 +4/5 +4/5 +4/5   Left +4/5 +4/5 +4/5 +4/5 +4/5 +4/5       Straight Leg raise positive R 90 degrees. Ambulation without assistive device. FWB. Written by Kurtis Moore, as dictated by April Minaya MD.    I, Dr. April Minaya MD, confirm that all documentation is accurate. Mr. Jazmyne Anderson may have a reminder for a \"due or due soon\" health maintenance. I have asked that he contact his primary care provider for follow-up on this health maintenance.

## 2019-06-03 NOTE — PATIENT INSTRUCTIONS

## 2019-09-03 ENCOUNTER — LAB ONLY (OUTPATIENT)
Dept: FAMILY MEDICINE CLINIC | Age: 84
End: 2019-09-03

## 2019-09-03 ENCOUNTER — HOSPITAL ENCOUNTER (OUTPATIENT)
Dept: LAB | Age: 84
Discharge: HOME | End: 2019-09-03
Payer: MEDICARE

## 2019-09-03 DIAGNOSIS — I11.9 BENIGN HYPERTENSIVE HEART DISEASE WITHOUT HEART FAILURE: ICD-10-CM

## 2019-09-03 DIAGNOSIS — E78.5 DYSLIPIDEMIA: ICD-10-CM

## 2019-09-03 DIAGNOSIS — E78.5 DYSLIPIDEMIA: Primary | ICD-10-CM

## 2019-09-03 LAB
ALBUMIN SERPL-MCNC: 3.8 G/DL (ref 3.4–5)
ALBUMIN/GLOB SERPL: 1.2 {RATIO} (ref 0.8–1.7)
ALP SERPL-CCNC: 77 U/L (ref 45–117)
ALT SERPL-CCNC: 20 U/L (ref 16–61)
ANION GAP SERPL CALC-SCNC: 5 MMOL/L (ref 3–18)
AST SERPL-CCNC: 20 U/L (ref 10–38)
BILIRUB SERPL-MCNC: 0.7 MG/DL (ref 0.2–1)
BUN SERPL-MCNC: 15 MG/DL (ref 7–18)
BUN/CREAT SERPL: 14 (ref 12–20)
CALCIUM SERPL-MCNC: 9.1 MG/DL (ref 8.5–10.1)
CHLORIDE SERPL-SCNC: 104 MMOL/L (ref 100–111)
CO2 SERPL-SCNC: 30 MMOL/L (ref 21–32)
CREAT SERPL-MCNC: 1.05 MG/DL (ref 0.6–1.3)
GLOBULIN SER CALC-MCNC: 3.2 G/DL (ref 2–4)
GLUCOSE SERPL-MCNC: 86 MG/DL (ref 74–99)
POTASSIUM SERPL-SCNC: 4.1 MMOL/L (ref 3.5–5.5)
PROT SERPL-MCNC: 7 G/DL (ref 6.4–8.2)
SODIUM SERPL-SCNC: 139 MMOL/L (ref 136–145)

## 2019-09-03 PROCEDURE — 36415 COLL VENOUS BLD VENIPUNCTURE: CPT

## 2019-09-03 PROCEDURE — 80053 COMPREHEN METABOLIC PANEL: CPT

## 2019-09-03 PROCEDURE — 80061 LIPID PANEL: CPT

## 2019-09-04 LAB
CHOLEST SERPL-MCNC: 181 MG/DL
HDLC SERPL-MCNC: 52 MG/DL (ref 40–60)
HDLC SERPL: 3.5 {RATIO} (ref 0–5)
LDLC SERPL CALC-MCNC: 107 MG/DL (ref 0–100)
LIPID PROFILE,FLP: ABNORMAL
TRIGL SERPL-MCNC: 110 MG/DL (ref ?–150)
VLDLC SERPL CALC-MCNC: 22 MG/DL

## 2019-09-10 ENCOUNTER — OFFICE VISIT (OUTPATIENT)
Dept: FAMILY MEDICINE CLINIC | Age: 84
End: 2019-09-10

## 2019-09-10 VITALS
HEART RATE: 80 BPM | RESPIRATION RATE: 20 BRPM | BODY MASS INDEX: 26.52 KG/M2 | OXYGEN SATURATION: 96 % | HEIGHT: 68 IN | WEIGHT: 175 LBS | SYSTOLIC BLOOD PRESSURE: 140 MMHG | DIASTOLIC BLOOD PRESSURE: 65 MMHG | TEMPERATURE: 98.1 F

## 2019-09-10 DIAGNOSIS — E78.5 DYSLIPIDEMIA: ICD-10-CM

## 2019-09-10 DIAGNOSIS — Z00.00 MEDICARE ANNUAL WELLNESS VISIT, SUBSEQUENT: Primary | ICD-10-CM

## 2019-09-10 DIAGNOSIS — I48.0 PAROXYSMAL ATRIAL FIBRILLATION (HCC): ICD-10-CM

## 2019-09-10 DIAGNOSIS — I11.9 BENIGN HYPERTENSIVE HEART DISEASE WITHOUT HEART FAILURE: ICD-10-CM

## 2019-09-10 RX ORDER — METOPROLOL SUCCINATE 25 MG/1
TABLET, EXTENDED RELEASE ORAL
Refills: 6 | COMMUNITY
Start: 2019-09-07 | End: 2022-03-03

## 2019-09-10 RX ORDER — AZELASTINE 1 MG/ML
SPRAY, METERED NASAL
Refills: 11 | COMMUNITY
Start: 2019-09-07 | End: 2022-03-03

## 2019-09-10 NOTE — PATIENT INSTRUCTIONS
Medicare Wellness Visit, Male    The best way to live healthy is to have a lifestyle where you eat a well-balanced diet, exercise regularly, limit alcohol use, and quit all forms of tobacco/nicotine, if applicable. Regular preventive services are another way to keep healthy. Preventive services (vaccines, screening tests, monitoring & exams) can help personalize your care plan, which helps you manage your own care. Screening tests can find health problems at the earliest stages, when they are easiest to treat. [facility] follows the current, evidence-based guidelines published by the [facility] ([facility]) when recommending preventive services for our patients. Because we follow these guidelines, sometimes recommendations change over time as research supports it. (For example, a prostate screening blood test is no longer routinely recommended for men with no symptoms.)  Of course, you and your doctor may decide to screen more often for some diseases, based on your risk and co-morbidities (chronic disease you are already diagnosed with). Preventive services for you include:  - Medicare offers their members a free annual wellness visit, which is time for you and your primary care provider to discuss and plan for your preventive service needs. Take advantage of this benefit every year!  -All adults over age 72 should receive the recommended pneumonia vaccines. Current USPSTF guidelines recommend a series of two vaccines for the best pneumonia protection.   -All adults should have a flu vaccine yearly and an ECG.  All adults age 61 and older should receive a shingles vaccine once in their lifetime.    -All adults age 38-68 who are overweight should have a diabetes screening test once every three years.   -Other screening tests & preventive services for persons with diabetes include: an eye exam to screen for diabetic retinopathy, a kidney function test, a foot exam, and stricter control over your cholesterol.   -Cardiovascular screening for adults with routine risk involves an electrocardiogram (ECG) at intervals determined by the provider.   -Colorectal cancer screening should be done for adults age 54-65 with no increased risk factors for colorectal cancer. There are a number of acceptable methods of screening for this type of cancer. Each test has its own benefits and drawbacks. Discuss with your provider what is most appropriate for you during your annual wellness visit. The different tests include: colonoscopy (considered the best screening method), a fecal occult blood test, a fecal DNA test, and sigmoidoscopy.  -All adults born between [facility] should be screened once for Hepatitis C.  -An Abdominal Aortic Aneurysm (AAA) Screening is recommended for men age 73-68 who has ever smoked in their lifetime. Here is a list of your current Health Maintenance items (your personalized list of preventive services) with a due date:  Health Maintenance Due   Topic Date Due    DTaP/Tdap/Td  (1 - Tdap) 02/27/1954    Shingles Vaccine (1 of 2) 02/27/1983    Glaucoma Screening   02/27/1998    Pneumococcal Vaccine (1 of 2 - PCV13) 02/27/1998    Flu Vaccine  08/01/2019    Annual Well Visit  08/24/2019     Medicare Part B Preventive Services Limitations Recommendation Scheduled   Bone Mass Measurement  (age 72 & older, biennial) Requires diagnosis related to osteoporosis or estrogen deficiency. Biennial benefit unless patient has history of long-term glucocorticoid tx or baseline is needed because initial test was by other method  NA   Cardiovascular Screening Blood Tests (every 5 years)  Total cholesterol, HDL, Triglycerides and ECG Order blood work  as a panel if possible and  for adults with routine risk  an electrocardiogram (ECG) at intervals determined by the provider.    9/2019   Colorectal Cancer Screening  -Fecal occult blood test (annual)  -Flexible sigmoidoscopy (5y)  -Screening colonoscopy (10y)  -Barium Enema Colorectal cancer screening should be done for adults age 54-65 with no increased risk factors for colorectal cancer. There are a number of acceptable methods of screening for this type of cancer. Each test has its own benefits and drawbacks. Discuss with your provider what is most appropriate for you during your annual wellness visit. The different tests include: colonoscopy (considered the best screening method), a fecal occult blood test, a fecal DNA test, and sigmoidoscopy  NA   Counseling to Prevent Tobacco Use (up to 8 sessions per year)  - Counseling greater than 3 and up to 10 minutes  - Counseling greater than 10 minutes Patients must be asymptomatic of tobacco-related conditions to receive as preventive service  NA   Diabetes Screening Tests (at least every 3 years, Medicare covers annually or at 6-month intervals for prediabetic patients)    Fasting blood sugar (FBS) or glucose tolerance test (GTT) All adults age 38-68 who are overweight should have a diabetes screening test once every three years.   -Other screening tests & preventive services for persons with diabetes include: an eye exam to screen for diabetic retinopathy, a kidney function test, a foot exam, and stricter control over your cholesterol. 9/2019   Diabetes Self-Management Training (DSMT) (no USPSTF recommendation) Requires referral by treating physician for patient with diabetes or renal disease. 10 hours of initial DSMT session of no less than 30 minutes each in a continuous 12-month period. 2 hours of follow-up DSMT in subsequent years.      Glaucoma Screening (no USPSTF recommendation) Diabetes mellitus, family history, , age 48 or over,  American, age 72 or over  Ordered    Human Immunodeficiency Virus (HIV) Screening (annually for increased risk patients)  HIV-1 and HIV-2 by EIA, BOWEN, rapid antibody test, or oral mucosa transudate Patient must be at increased risk for HIV infection per USPSTF guidelines or pregnant. Tests covered annually for patients at increased risk. Pregnant patients may receive up to 3 test during pregnancy. NA   Medical Nutrition Therapy (MNT) (for diabetes or renal disease not recommended schedule) Requires referral by treating physician for patient with diabetes or renal disease. Can be provided in same year as diabetes self-management training (DSMT), and CMS recommends medical nutrition therapy take place after DSMT. Up to 3 hours for initial year and 2 hours in subsequent years. NA   Prostate Cancer Screening (annually up to age 76)  - Digital rectal exam (REMY)  - Prostate specific antigen (PSA) Annually (age 48 or over), REMY not paid separately when covered E/M service is provided on same date  Men up to age 76 may need a screening blood test for prostate cancer at certain intervals, depending on their personal and family history. This decision is between the patient and his provider. NA   Seasonal Influenza Vaccination (annually) All adults should have a flu vaccine yearly        Pneumococcal Vaccination (once after 72) All adults  over age 72 should receive the recommended pneumonia vaccines. Current USPSTF guidelines recommend a series of two vaccines for the best pneumonia protection. Hepatitis B Vaccinations (if medium/high risk) Medium/high risk factors:  End-stage renal disease,  Hemophiliacs who received Factor VIII or IX concentrates, Clients of institutions for the mentally retarded, Persons who live in the same house as a HepB virus carrier, Homosexual men, Illicit injectable drug abusers. Shingles Vaccination A shingles vaccine is also recommended once in a lifetime after age 61     Ultrasound Screening for Abdominal Aortic Aneurysm (AAA) (once) An Abdominal Aortic Aneurysm (AAA) Screening is recommended for men age 73-68 who has ever smoked in their lifetime.   of the following criteria:  - Men who are 73-68 years old and have smoked more than 100 cigarettes in their lifetime.  -Anyone with a FH of AAA  -Anyone recommended for screening by USPSTF       Hep C All adults born between [birth year] and 1965 should be screened once for Hepatitis C     Tetanus  All adults should have a tetanus vaccine every 10 years          High Blood Pressure: Care Instructions  Overview    It's normal for blood pressure to go up and down throughout the day. But if it stays up, you have high blood pressure. Another name for high blood pressure is hypertension. Despite what a lot of people think, high blood pressure usually doesn't cause headaches or make you feel dizzy or lightheaded. It usually has no symptoms. But it does increase your risk of stroke, heart attack, and other problems. You and your doctor will talk about your risks of these problems based on your blood pressure. Your doctor will give you a goal for your blood pressure. Your goal will be based on your health and your age. Lifestyle changes, such as eating healthy and being active, are always important to help lower blood pressure. You might also take medicine to reach your blood pressure goal.  Follow-up care is a key part of your treatment and safety. Be sure to make and go to all appointments, and call your doctor if you are having problems. It's also a good idea to know your test results and keep a list of the medicines you take. How can you care for yourself at home? Medical treatment  · If you stop taking your medicine, your blood pressure will go back up. You may take one or more types of medicine to lower your blood pressure. Be safe with medicines. Take your medicine exactly as prescribed. Call your doctor if you think you are having a problem with your medicine. · Talk to your doctor before you start taking aspirin every day. Aspirin can help certain people lower their risk of a heart attack or stroke.  But taking aspirin isn't right for everyone, because it can cause serious bleeding. · See your doctor regularly. You may need to see the doctor more often at first or until your blood pressure comes down. · If you are taking blood pressure medicine, talk to your doctor before you take decongestants or anti-inflammatory medicine, such as ibuprofen. Some of these medicines can raise blood pressure. · Learn how to check your blood pressure at home. Lifestyle changes  · Stay at a healthy weight. This is especially important if you put on weight around the waist. Losing even 10 pounds can help you lower your blood pressure. · If your doctor recommends it, get more exercise. Walking is a good choice. Bit by bit, increase the amount you walk every day. Try for at least 30 minutes on most days of the week. You also may want to swim, bike, or do other activities. · Avoid or limit alcohol. Talk to your doctor about whether you can drink any alcohol. · Try to limit how much sodium you eat to less than 2,300 milligrams (mg) a day. Your doctor may ask you to try to eat less than 1,500 mg a day. · Eat plenty of fruits (such as bananas and oranges), vegetables, legumes, whole grains, and low-fat dairy products. · Lower the amount of saturated fat in your diet. Saturated fat is found in animal products such as milk, cheese, and meat. Limiting these foods may help you lose weight and also lower your risk for heart disease. · Do not smoke. Smoking increases your risk for heart attack and stroke. If you need help quitting, talk to your doctor about stop-smoking programs and medicines. These can increase your chances of quitting for good. When should you call for help? Call 911 anytime you think you may need emergency care. This may mean having symptoms that suggest that your blood pressure is causing a serious heart or blood vessel problem. Your blood pressure may be over 180/120.   For example, call 911 if:    · You have symptoms of a heart attack. These may include:  ?  Chest pain or pressure, or a strange feeling in the chest.  ? Sweating. ? Shortness of breath. ? Nausea or vomiting. ? Pain, pressure, or a strange feeling in the back, neck, jaw, or upper belly or in one or both shoulders or arms. ? Lightheadedness or sudden weakness. ? A fast or irregular heartbeat.     · You have symptoms of a stroke. These may include:  ? Sudden numbness, tingling, weakness, or loss of movement in your face, arm, or leg, especially on only one side of your body. ? Sudden vision changes. ? Sudden trouble speaking. ? Sudden confusion or trouble understanding simple statements. ? Sudden problems with walking or balance. ? A sudden, severe headache that is different from past headaches.     · You have severe back or belly pain.    Do not wait until your blood pressure comes down on its own. Get help right away.   Call your doctor now or seek immediate care if:    · Your blood pressure is much higher than normal (such as 180/120 or higher), but you don't have symptoms.     · You think high blood pressure is causing symptoms, such as:  ? Severe headache.  ? Blurry vision.    Watch closely for changes in your health, and be sure to contact your doctor if:    · Your blood pressure measures higher than your doctor recommends at least 2 times. That means the top number is higher or the bottom number is higher, or both.     · You think you may be having side effects from your blood pressure medicine. Where can you learn more? Go to http://kerline-bernabe.info/. Enter E354 in the search box to learn more about \"High Blood Pressure: Care Instructions. \"  Current as of: July 22, 2018  Content Version: 12.1  © 4052-8982 [employer]. Care instructions adapted under license by [employer] (which disclaims liability or warranty for this information).  If you have questions about a medical condition or this instruction, always ask your healthcare professional. [employer] disclaims any warranty or liability for your use of this information.

## 2019-09-10 NOTE — PROGRESS NOTES
This is the Subsequent Medicare Annual Wellness Exam, performed 12 months or more after the Initial AWV or the last Subsequent AWV    I have reviewed the patient's medical history in detail and updated the computerized patient record. History     Past Medical History:   Diagnosis Date    Atrial fibrillation     paroxysmal   CHADS score 2  (-CHF, +HTN, +AGE, -DM, -CVA)    Back pain     Dyslipidemia     GERD     Hypertrension     Nephrolithiasis     Pacemaker     ,       [employer]    Sick sinus syndrome       Past Surgical History:   Procedure Laterality Date    CARDIAC SURG PROCEDURE UNLIST      Pacemaker    HX APPENDECTOMY      in his early s    HX CATARACT REMOVAL        bilateral    HX PACEMAKER       Current Outpatient Medications   Medication Sig Dispense Refill    azelastine (ASTELIN) 137 mcg (0.1 %) nasal spray USE 2 SPRAY(S) IN EACH NOSTRIL TWICE DAILY  11    metoprolol succinate (TOPROL-XL) 25 mg XL tablet TAKE 1 TABLET BY MOUTH ONCE DAILY  6    gabapentin (NEURONTIN) 100 mg capsule Take 1 tab PO QHS PRN for pain  Indications: NEUROPATHIC PAIN 30 Cap 2    ibuprofen 100 mg tablet Take 100 mg by mouth every six (6) hours as needed for Pain.  gabapentin (NEURONTIN) 100 mg capsule Take 1 tab PO every day-BID PRN for nerve pain . 60 Cap 2     Allergies   Allergen Reactions    Lisinopril Other (comments)     Irregular heart rate    Other Plant, Animal, Environmental Other (comments)     Grass, dust, and maple trees causes congestion     Family History   Problem Relation Age of Onset    Other Father 39         from intestinal blockage in [de-identified]    Stroke Mother 68     Social History     Tobacco Use    Smoking status: Never Smoker    Smokeless tobacco: Never Used   Substance Use Topics    Alcohol use:  Yes     Alcohol/week: 4.2 standard drinks     Types: 5 Glasses of wine per week     Patient Active Problem List   Diagnosis Code    Hypertrension I11.9    Dyslipidemia E78.5    Atrial fibrillation I48.0    Sick sinus syndrome I49.5    ACP (advance care planning) Z71.89    Spinal stenosis at L4-L5 level M48.061    Spondylolisthesis at L4-L5 level M43.16       Depression Risk Factor Screening:     3 most recent PHQ Screens 9/10/2019   Little interest or pleasure in doing things Not at all   Feeling down, depressed, irritable, or hopeless Not at all   Total Score PHQ 2 0     Alcohol Risk Factor Screening:   Patient states he drink one glass of red wine nightly. Functional Ability and Level of Safety:   Hearing Loss  Hearing is good. Patient does she ENT. Activities of Daily Living  The home contains: no safety equipment. Patient does total self care    Fall Risk  Fall Risk Assessment, last 12 mths 9/10/2019   Able to walk? Yes   Fall in past 12 months? No   Fall with injury? -   Number of falls in past 12 months -   Fall Risk Score -       Abuse Screen  Patient is not abused    Cognitive Screening   Evaluation of Cognitive Function:  Has your family/caregiver stated any concerns about your memory: no  Normal    Patient Care Team   Patient Care Team:  Caden Mcmahon MD as PCP - General (Internal Medicine)  Bridgett Martinez MD (Cardiology)  Allison Carey MD (Ophthalmology)     Glaucoma Screening- 4 years ago pt encouraged to f/u  Pneumonia Vaccine- declines   Shingles Vaccine- declines  Tdap Vaccine- not UTD   Colonoscopy- Declines. Never had one. Declines FIT test.   PSA- declines   Advance Directive- Does not have one. Will consider getting one. 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 Medicare annual wellness visit, subsequent    2. Hypertrension  -     METABOLIC PANEL, COMPREHENSIVE; Future    3.  Dyslipidemia  -     LIPID PANEL; Future    4. Atrial fibrillation        Health Maintenance Due   Topic Date Due    DTaP/Tdap/Td series (1 - Tdap) 02/27/1954    Shingrix Vaccine Age 50> (1 of 2) 02/27/1983    GLAUCOMA SCREENING Q2Y  02/27/1998    Pneumococcal 65+ years (1 of 2 - PCV13) 02/27/1998    Influenza Age 5 to Adult  08/01/2019     A comprehensive 5 year plan for medical care and screening exams was reviewed with pt and they received a copy of it.

## 2019-09-10 NOTE — PROGRESS NOTES
Patient is in the office today for a 6 month follow up, and Medicare Wellness Visit. Do you have an Advance Directive no  Do you want more information: information declined     1. Have you been to the ER, urgent care clinic since your last visit? Hospitalized since your last visit? No    2. Have you seen or consulted any other health care providers outside of the [address] [place] [address] since your last visit? Include any pap smears or colon screening.  No

## 2019-09-10 NOTE — PROGRESS NOTES
Subjective:       Chief Complaint  The patient presents for follow up of hypertension and high cholesterol. Afib        PATRIC Camacho is a 80 y.o. male seen for follow up of hyperlipidemia. Brian has hypertension. Hypertension well controlled, no significant medication side effects noted, on Toprol, hyperlipidemia fairly well controlled at age 80. Pt is followed by cardiology. Allergies are stable on Zyrtec and Nasacort prn and allergy shots. Diet and Lifestyle: generally follows a low fat low cholesterol diet, exercises sporadically With his worsening spinal stenosis he has not been as active as he was in the past. He rides a cart when he plays golf instead of walking. Home BP Monitoring: is not measured at home. Other symptoms and concerns:  Pt is followed by cardiology for his afib and has a pacemaker. Current Outpatient Medications   Medication Sig    azelastine (ASTELIN) 137 mcg (0.1 %) nasal spray USE 2 SPRAY(S) IN EACH NOSTRIL TWICE DAILY    metoprolol succinate (TOPROL-XL) 25 mg XL tablet TAKE 1 TABLET BY MOUTH ONCE DAILY    gabapentin (NEURONTIN) 100 mg capsule Take 1 tab PO QHS PRN for pain  Indications: NEUROPATHIC PAIN    ibuprofen 100 mg tablet Take 100 mg by mouth every six (6) hours as needed for Pain.  gabapentin (NEURONTIN) 100 mg capsule Take 1 tab PO every day-BID PRN for nerve pain . No current facility-administered medications for this visit.               Review of Systems  Respiratory: negative for dyspnea on exertion  Cardiovascular: negative for chest pain    Objective:     Visit Vitals  /65   Pulse 80   Temp 98.1 °F (36.7 °C) (Oral)   Resp 20   Ht 5' 8\" (1.727 m)   Wt 175 lb (79.4 kg)   SpO2 96%   BMI 26.61 kg/m²        Eyes - pupils equal and reactive, extraocular eye movements intact  Neck - supple, no significant adenopathy, carotids upstroke normal bilaterally, no bruits  Chest - clear to auscultation, no wheezes, rales or rhonchi, symmetric air entry  Heart - normal rate, regular rhythm, normal S1, S2, 3/5 systolic murmur at RSB  Extremities - peripheral pulses normal, no pedal edema, no clubbing or cyanosis      Labs:   Lab Results   Component Value Date/Time    Cholesterol, total 181 09/03/2019 08:05 AM    HDL Cholesterol 52 09/03/2019 08:05 AM    LDL, calculated 107 (H) 09/03/2019 08:05 AM    Triglyceride 110 09/03/2019 08:05 AM    CHOL/HDL Ratio 3.5 09/03/2019 08:05 AM     Lab Results   Component Value Date/Time    ALT (SGPT) 20 09/03/2019 08:05 AM    AST (SGOT) 20 09/03/2019 08:05 AM    Alk. phosphatase 77 09/03/2019 08:05 AM    Bilirubin, total 0.7 09/03/2019 08:05 AM     Lab Results   Component Value Date/Time    GFR est AA >60 09/03/2019 08:05 AM    GFR est non-AA >60 09/03/2019 08:05 AM    Creatinine 1.05 09/03/2019 08:05 AM    BUN 15 09/03/2019 08:05 AM    Sodium 139 09/03/2019 08:05 AM    Potassium 4.1 09/03/2019 08:05 AM    Chloride 104 09/03/2019 08:05 AM    CO2 30 09/03/2019 08:05 AM              Assessment / Plan     Hypertension well controlled, on Toprol  Hyperlipidemia fairly well controlled with diet       ICD-10-CM ICD-9-CM    1. Medicare annual wellness visit, subsequent Z00.00 V70.0    2. Hypertrension B90.1 322.13 METABOLIC PANEL, COMPREHENSIVE   3. Dyslipidemia E78.5 272.4 LIPID PANEL   4. Atrial fibrillation I48.0 427.31 Stable on Toprol. Pt is followed by cardiology                  Low cholesterol diet, weight control and daily exercise discussed. Follow-up and Dispositions    · Return in about 6 months (around 3/10/2020) for labs 1 week before. Reviewed plan of care. Patient has provided input and agrees with goals.

## 2019-11-22 DIAGNOSIS — M54.16 LUMBAR NEURITIS: ICD-10-CM

## 2019-11-22 DIAGNOSIS — M48.061 SPINAL STENOSIS AT L4-L5 LEVEL: ICD-10-CM

## 2019-11-22 DIAGNOSIS — M43.16 SPONDYLOLISTHESIS OF LUMBAR REGION: Primary | ICD-10-CM

## 2019-11-23 RX ORDER — GABAPENTIN 100 MG/1
CAPSULE ORAL
Qty: 60 CAP | Refills: 1 | Status: SHIPPED | OUTPATIENT
Start: 2019-11-23 | End: 2020-07-27 | Stop reason: DRUGHIGH

## 2020-03-02 ENCOUNTER — HOSPITAL ENCOUNTER (OUTPATIENT)
Dept: LAB | Age: 85
Discharge: HOME | End: 2020-03-02
Payer: MEDICARE

## 2020-03-02 DIAGNOSIS — E78.5 DYSLIPIDEMIA: ICD-10-CM

## 2020-03-02 DIAGNOSIS — I11.9 BENIGN HYPERTENSIVE HEART DISEASE WITHOUT HEART FAILURE: ICD-10-CM

## 2020-03-02 LAB
ALBUMIN SERPL-MCNC: 3.4 G/DL (ref 3.4–5)
ALBUMIN/GLOB SERPL: 0.9 {RATIO} (ref 0.8–1.7)
ALP SERPL-CCNC: 74 U/L (ref 45–117)
ALT SERPL-CCNC: 19 U/L (ref 16–61)
ANION GAP SERPL CALC-SCNC: 6 MMOL/L (ref 3–18)
AST SERPL-CCNC: 18 U/L (ref 10–38)
BILIRUB SERPL-MCNC: 0.5 MG/DL (ref 0.2–1)
BUN SERPL-MCNC: 20 MG/DL (ref 7–18)
BUN/CREAT SERPL: 19 (ref 12–20)
CALCIUM SERPL-MCNC: 8.8 MG/DL (ref 8.5–10.1)
CHLORIDE SERPL-SCNC: 106 MMOL/L (ref 100–111)
CHOLEST SERPL-MCNC: 168 MG/DL
CO2 SERPL-SCNC: 28 MMOL/L (ref 21–32)
CREAT SERPL-MCNC: 1.03 MG/DL (ref 0.6–1.3)
GLOBULIN SER CALC-MCNC: 3.6 G/DL (ref 2–4)
GLUCOSE SERPL-MCNC: 90 MG/DL (ref 74–99)
HDLC SERPL-MCNC: 47 MG/DL (ref 40–60)
HDLC SERPL: 3.6 {RATIO} (ref 0–5)
LDLC SERPL CALC-MCNC: 105.8 MG/DL (ref 0–100)
LIPID PROFILE,FLP: ABNORMAL
POTASSIUM SERPL-SCNC: 3.9 MMOL/L (ref 3.5–5.5)
PROT SERPL-MCNC: 7 G/DL (ref 6.4–8.2)
SODIUM SERPL-SCNC: 140 MMOL/L (ref 136–145)
TRIGL SERPL-MCNC: 76 MG/DL (ref ?–150)
VLDLC SERPL CALC-MCNC: 15.2 MG/DL

## 2020-03-02 PROCEDURE — 80053 COMPREHEN METABOLIC PANEL: CPT

## 2020-03-02 PROCEDURE — 80061 LIPID PANEL: CPT

## 2020-03-02 PROCEDURE — 36415 COLL VENOUS BLD VENIPUNCTURE: CPT

## 2020-03-10 ENCOUNTER — OFFICE VISIT (OUTPATIENT)
Dept: FAMILY MEDICINE CLINIC | Age: 85
End: 2020-03-10

## 2020-03-10 VITALS
HEART RATE: 73 BPM | OXYGEN SATURATION: 96 % | HEIGHT: 68 IN | WEIGHT: 176 LBS | TEMPERATURE: 98.1 F | BODY MASS INDEX: 26.67 KG/M2 | DIASTOLIC BLOOD PRESSURE: 72 MMHG | SYSTOLIC BLOOD PRESSURE: 155 MMHG | RESPIRATION RATE: 20 BRPM

## 2020-03-10 DIAGNOSIS — M48.062 SPINAL STENOSIS OF LUMBAR REGION WITH NEUROGENIC CLAUDICATION: ICD-10-CM

## 2020-03-10 DIAGNOSIS — I48.0 PAROXYSMAL ATRIAL FIBRILLATION (HCC): ICD-10-CM

## 2020-03-10 DIAGNOSIS — I11.9 BENIGN HYPERTENSIVE HEART DISEASE WITHOUT HEART FAILURE: Primary | ICD-10-CM

## 2020-03-10 DIAGNOSIS — E78.5 DYSLIPIDEMIA: ICD-10-CM

## 2020-03-10 RX ORDER — MELOXICAM 7.5 MG/1
7.5 TABLET ORAL DAILY
Qty: 90 TAB | Refills: 1 | Status: SHIPPED | OUTPATIENT
Start: 2020-03-10 | End: 2020-07-27

## 2020-03-10 NOTE — PROGRESS NOTES
Patient is in the office today for 6 month follow up. 1. Have you been to the ER, urgent care clinic since your last visit? Hospitalized since your last visit? No    2. Have you seen or consulted any other health care providers outside of the [address] [place] [address] since your last visit? Include any pap smears or colon screening.  No

## 2020-03-10 NOTE — PATIENT INSTRUCTIONS
High Blood Pressure: Care Instructions Overview It's normal for blood pressure to go up and down throughout the day. But if it stays up, you have high blood pressure. Another name for high blood pressure is hypertension. Despite what a lot of people think, high blood pressure usually doesn't cause headaches or make you feel dizzy or lightheaded. It usually has no symptoms. But it does increase your risk of stroke, heart attack, and other problems. You and your doctor will talk about your risks of these problems based on your blood pressure. Your doctor will give you a goal for your blood pressure. Your goal will be based on your health and your age. Lifestyle changes, such as eating healthy and being active, are always important to help lower blood pressure. You might also take medicine to reach your blood pressure goal. 
Follow-up care is a key part of your treatment and safety. Be sure to make and go to all appointments, and call your doctor if you are having problems. It's also a good idea to know your test results and keep a list of the medicines you take. How can you care for yourself at home? Medical treatment · If you stop taking your medicine, your blood pressure will go back up. You may take one or more types of medicine to lower your blood pressure. Be safe with medicines. Take your medicine exactly as prescribed. Call your doctor if you think you are having a problem with your medicine. · Talk to your doctor before you start taking aspirin every day. Aspirin can help certain people lower their risk of a heart attack or stroke. But taking aspirin isn't right for everyone, because it can cause serious bleeding. · See your doctor regularly. You may need to see the doctor more often at first or until your blood pressure comes down. · If you are taking blood pressure medicine, talk to your doctor before you take decongestants or anti-inflammatory medicine, such as ibuprofen. Some of these medicines can raise blood pressure. · Learn how to check your blood pressure at home. Lifestyle changes · Stay at a healthy weight. This is especially important if you put on weight around the waist. Losing even 10 pounds can help you lower your blood pressure. · If your doctor recommends it, get more exercise. Walking is a good choice. Bit by bit, increase the amount you walk every day. Try for at least 30 minutes on most days of the week. You also may want to swim, bike, or do other activities. · Avoid or limit alcohol. Talk to your doctor about whether you can drink any alcohol. · Try to limit how much sodium you eat to less than 2,300 milligrams (mg) a day. Your doctor may ask you to try to eat less than 1,500 mg a day. · Eat plenty of fruits (such as bananas and oranges), vegetables, legumes, whole grains, and low-fat dairy products. · Lower the amount of saturated fat in your diet. Saturated fat is found in animal products such as milk, cheese, and meat. Limiting these foods may help you lose weight and also lower your risk for heart disease. · Do not smoke. Smoking increases your risk for heart attack and stroke. If you need help quitting, talk to your doctor about stop-smoking programs and medicines. These can increase your chances of quitting for good. When should you call for help? Call  911 anytime you think you may need emergency care. This may mean having symptoms that suggest that your blood pressure is causing a serious heart or blood vessel problem. Your blood pressure may be over 180/120. 
 For example, call  911 if: 
  · You have symptoms of a heart attack. These may include: 
? Chest pain or pressure, or a strange feeling in the chest. 
? Sweating. ? Shortness of breath. ? Nausea or vomiting. ? Pain, pressure, or a strange feeling in the back, neck, jaw, or upper belly or in one or both shoulders or arms. ? Lightheadedness or sudden weakness. ? A fast or irregular heartbeat.  
  · You have symptoms of a stroke. These may include: 
? Sudden numbness, tingling, weakness, or loss of movement in your face, arm, or leg, especially on only one side of your body. ? Sudden vision changes. ? Sudden trouble speaking. ? Sudden confusion or trouble understanding simple statements. ? Sudden problems with walking or balance. ? A sudden, severe headache that is different from past headaches.  
  · You have severe back or belly pain.  
 Do not wait until your blood pressure comes down on its own. Get help right away. 
 Call your doctor now or seek immediate care if: 
  · Your blood pressure is much higher than normal (such as 180/120 or higher), but you don't have symptoms.  
  · You think high blood pressure is causing symptoms, such as: 
? Severe headache. 
? Blurry vision.  
 Watch closely for changes in your health, and be sure to contact your doctor if: 
  · Your blood pressure measures higher than your doctor recommends at least 2 times. That means the top number is higher or the bottom number is higher, or both.  
  · You think you may be having side effects from your blood pressure medicine. Where can you learn more? Go to http://kerline-bernabe.info/. Enter Y409 in the search box to learn more about \"High Blood Pressure: Care Instructions. \" Current as of: April 9, 2019 Content Version: 12.2 © 7147-2297 [employer]. Care instructions adapted under license by [employer] (which disclaims liability or warranty for this information). If you have questions about a medical condition or this instruction, always ask your healthcare professional. [place] [address] any warranty or liability for your use of this information.

## 2020-03-10 NOTE — PROGRESS NOTES
Subjective:       Chief Complaint  The patient presents for follow up of hypertension and high cholesterol. Afib        HPI  Fer Jarrell is a 80 y.o. male seen for follow up of hyperlipidemia. Brian has hypertension. Hypertension well controlled, no significant medication side effects noted, on Toprol, hyperlipidemia fairly well controlled at age 80. Pt is followed by cardiology. Allergies are stable on Zyrtec and Nasacort prn and allergy shots. Diet and Lifestyle: generally follows a low fat low cholesterol diet, exercises sporadically With his worsening spinal stenosis and neurogenic claudication he has not been as active as he was in the past. He works in his yard and tries to got to the gym and do a sitting exercise like riding a bike or exercise rowing machine 2-3x/week. Home BP Monitoring: is  measured at home and SBP is ~130's    Other symptoms and concerns:  Pt is followed by cardiology for his afib and has a pacemaker. Current Outpatient Medications   Medication Sig    meloxicam (MOBIC) 7.5 mg tablet Take 1 Tab by mouth daily.  gabapentin (NEURONTIN) 100 mg capsule TAKE 1 CAPSULE BY MOUTH ONE TO TWO TIMES DAILY AS NEEDED FOR NERVE PAIN    azelastine (ASTELIN) 137 mcg (0.1 %) nasal spray USE 2 SPRAY(S) IN EACH NOSTRIL TWICE DAILY    metoprolol succinate (TOPROL-XL) 25 mg XL tablet TAKE 1 TABLET BY MOUTH ONCE DAILY    gabapentin (NEURONTIN) 100 mg capsule Take 1 tab PO QHS PRN for pain  Indications: NEUROPATHIC PAIN    ibuprofen 100 mg tablet Take 100 mg by mouth every six (6) hours as needed for Pain. No current facility-administered medications for this visit.               Review of Systems  Respiratory: negative for dyspnea on exertion  Cardiovascular: negative for chest pain    Objective:     Visit Vitals  /72 (BP 1 Location: Left arm, BP Patient Position: Sitting)   Pulse 73   Temp 98.1 °F (36.7 °C) (Oral)   Resp 20   Ht 5' 8\" (1.727 m)   Wt 176 lb (79.8 kg)   SpO2 96%   BMI 26.76 kg/m²        Eyes - pupils equal and reactive, extraocular eye movements intact  Neck - supple, no significant adenopathy, carotids upstroke normal bilaterally, no bruits  Chest - clear to auscultation, no wheezes, rales or rhonchi, symmetric air entry  Heart - normal rate, regular rhythm, normal S1, S2, 3/5 systolic murmur at RSB  Extremities - peripheral pulses normal, no pedal edema, no clubbing or cyanosis      Labs:   Lab Results   Component Value Date/Time    Cholesterol, total 168 03/02/2020 08:15 AM    HDL Cholesterol 47 03/02/2020 08:15 AM    LDL, calculated 105.8 (H) 03/02/2020 08:15 AM    Triglyceride 76 03/02/2020 08:15 AM    CHOL/HDL Ratio 3.6 03/02/2020 08:15 AM     Lab Results   Component Value Date/Time    ALT (SGPT) 19 03/02/2020 08:15 AM    AST (SGOT) 18 03/02/2020 08:15 AM    Alk. phosphatase 74 03/02/2020 08:15 AM    Bilirubin, total 0.5 03/02/2020 08:15 AM     Lab Results   Component Value Date/Time    GFR est AA >60 03/02/2020 08:15 AM    GFR est non-AA >60 03/02/2020 08:15 AM    Creatinine 1.03 03/02/2020 08:15 AM    BUN 20 (H) 03/02/2020 08:15 AM    Sodium 140 03/02/2020 08:15 AM    Potassium 3.9 03/02/2020 08:15 AM    Chloride 106 03/02/2020 08:15 AM    CO2 28 03/02/2020 08:15 AM              Assessment / Plan     Hypertension normally well controlled, on Toprol. Could be elevated due to pain. Hyperlipidemia fairly well controlled with diet       ICD-10-CM ICD-9-CM    1. Hypertrension I11.9 402.10 LIPID PANEL   2. Dyslipidemia E69.1 954.5 METABOLIC PANEL, COMPREHENSIVE   3. Atrial fibrillation I48.0 427.31 Stable on Toprol. Pt followed by Dr Noah Corrigan    4. Spinal stenosis of lumbar region with neurogenic claudication M48.062 724.03 Fairly well controlled on Neurontin QHS and tylenol. consider Cymbalta 30 mg in the future. Low cholesterol diet, weight control and daily exercise discussed.      Follow-up and Dispositions    · Return in about 6 months (around 9/10/2020) for OV, and Medicare Wellness Visit, labs 1 week before. Reviewed plan of care. Patient has provided input and agrees with goals.

## 2020-07-27 ENCOUNTER — OFFICE VISIT (OUTPATIENT)
Dept: ORTHOPEDIC SURGERY | Age: 85
End: 2020-07-27

## 2020-07-27 VITALS
DIASTOLIC BLOOD PRESSURE: 75 MMHG | TEMPERATURE: 97.5 F | HEART RATE: 63 BPM | WEIGHT: 179 LBS | SYSTOLIC BLOOD PRESSURE: 160 MMHG | HEIGHT: 68 IN | BODY MASS INDEX: 27.13 KG/M2 | OXYGEN SATURATION: 97 %

## 2020-07-27 DIAGNOSIS — M48.061 SPINAL STENOSIS AT L4-L5 LEVEL: ICD-10-CM

## 2020-07-27 RX ORDER — GABAPENTIN 100 MG/1
100 CAPSULE ORAL
Qty: 90 CAP | Refills: 5 | Status: SHIPPED | OUTPATIENT
Start: 2020-07-27 | End: 2021-02-22 | Stop reason: DRUGHIGH

## 2020-07-27 NOTE — PATIENT INSTRUCTIONS
Low Back Pain: Exercises Introduction Here are some examples of exercises for you to try. The exercises may be suggested for a condition or for rehabilitation. Start each exercise slowly. Ease off the exercises if you start to have pain. You will be told when to start these exercises and which ones will work best for you. How to do the exercises Press-up 1. Lie on your stomach, supporting your body with your forearms. 2. Press your elbows down into the floor to raise your upper back. As you do this, relax your stomach muscles and allow your back to arch without using your back muscles. As your press up, do not let your hips or pelvis come off the floor. 3. Hold for 15 to 30 seconds, then relax. 4. Repeat 2 to 4 times. Alternate arm and leg (bird dog) exercise Do this exercise slowly. Try to keep your body straight at all times, and do not let one hip drop lower than the other. 1. Start on the floor, on your hands and knees. 2. Tighten your belly muscles. 3. Raise one leg off the floor, and hold it straight out behind you. Be careful not to let your hip drop down, because that will twist your trunk. 4. Hold for about 6 seconds, then lower your leg and switch to the other leg. 5. Repeat 8 to 12 times on each leg. 6. Over time, work up to holding for 10 to 30 seconds each time. 7. If you feel stable and secure with your leg raised, try raising the opposite arm straight out in front of you at the same time. Knee-to-chest exercise 1. Lie on your back with your knees bent and your feet flat on the floor. 2. Bring one knee to your chest, keeping the other foot flat on the floor (or keeping the other leg straight, whichever feels better on your lower back). 3. Keep your lower back pressed to the floor. Hold for at least 15 to 30 seconds. 4. Relax, and lower the knee to the starting position. 5. Repeat with the other leg. Repeat 2 to 4 times with each leg. 6. To get more stretch, put your other leg flat on the floor while pulling your knee to your chest.   
Curl-ups 1. Lie on the floor on your back with your knees bent at a 90-degree angle. Your feet should be flat on the floor, about 12 inches from your buttocks. 2. Cross your arms over your chest. If this bothers your neck, try putting your hands behind your neck (not your head), with your elbows spread apart. 3. Slowly tighten your belly muscles and raise your shoulder blades off the floor. 4. Keep your head in line with your body, and do not press your chin to your chest. 
5. Hold this position for 1 or 2 seconds, then slowly lower yourself back down to the floor. 6. Repeat 8 to 12 times. Pelvic tilt exercise 1. Lie on your back with your knees bent. 2. \"Brace\" your stomach. This means to tighten your muscles by pulling in and imagining your belly button moving toward your spine. You should feel like your back is pressing to the floor and your hips and pelvis are rocking back. 3. Hold for about 6 seconds while you breathe smoothly. 4. Repeat 8 to 12 times. Heel dig bridging 1. Lie on your back with both knees bent and your ankles bent so that only your heels are digging into the floor. Your knees should be bent about 90 degrees. 2. Then push your heels into the floor, squeeze your buttocks, and lift your hips off the floor until your shoulders, hips, and knees are all in a straight line. 3. Hold for about 6 seconds as you continue to breathe normally, and then slowly lower your hips back down to the floor and rest for up to 10 seconds. 4. Do 8 to 12 repetitions. Hamstring stretch in doorway 1. Lie on your back in a doorway, with one leg through the open door. 2. Slide your leg up the wall to straighten your knee. You should feel a gentle stretch down the back of your leg. 3. Hold the stretch for at least 15 to 30 seconds.  Do not arch your back, point your toes, or bend either knee. Keep one heel touching the floor and the other heel touching the wall. 4. Repeat with your other leg. 5. Do 2 to 4 times for each leg. Hip flexor stretch 1. Kneel on the floor with one knee bent and one leg behind you. Place your forward knee over your foot. Keep your other knee touching the floor. 2. Slowly push your hips forward until you feel a stretch in the upper thigh of your rear leg. 3. Hold the stretch for at least 15 to 30 seconds. Repeat with your other leg. 4. Do 2 to 4 times on each side. Wall sit 1. Stand with your back 10 to 12 inches away from a wall. 2. Lean into the wall until your back is flat against it. 3. Slowly slide down until your knees are slightly bent, pressing your lower back into the wall. 4. Hold for about 6 seconds, then slide back up the wall. 5. Repeat 8 to 12 times. Follow-up care is a key part of your treatment and safety. Be sure to make and go to all appointments, and call your doctor if you are having problems. It's also a good idea to know your test results and keep a list of the medicines you take. Where can you learn more? Go to http://kerline-bernabe.info/ Enter Y121 in the search box to learn more about \"Low Back Pain: Exercises. \" Current as of: March 2, 2020               Content Version: 12.5 © 2006-2020 [employer]. Care instructions adapted under license by [employer] (which disclaims liability or warranty for this information). If you have questions about a medical condition or this instruction, always ask your healthcare professional. [address] any warranty or liability for your use of this information. Preventing Falls: Care Instructions Your Care Instructions Getting around your home safely can be a challenge if you have injuries or health problems that make it easy for you to fall.  Loose rugs and furniture in walkways are among the dangers for many older people who have problems walking or who have poor eyesight. People who have conditions such as arthritis, osteoporosis, or dementia also have to be careful not to fall. You can make your home safer with a few simple measures. Follow-up care is a key part of your treatment and safety. Be sure to make and go to all appointments, and call your doctor if you are having problems. It's also a good idea to know your test results and keep a list of the medicines you take. How can you care for yourself at home? Taking care of yourself · You may get dizzy if you do not drink enough water. To prevent dehydration, drink plenty of fluids, enough so that your urine is light yellow or clear like water. Choose water and other caffeine-free clear liquids. If you have kidney, heart, or liver disease and have to limit fluids, talk with your doctor before you increase the amount of fluids you drink. · Exercise regularly to improve your strength, muscle tone, and balance. Walk if you can. Swimming may be a good choice if you cannot walk easily. · Have your vision and hearing checked each year or any time you notice a change. If you have trouble seeing and hearing, you might not be able to avoid objects and could lose your balance. · Know the side effects of the medicines you take. Ask your doctor or pharmacist whether the medicines you take can affect your balance. Sleeping pills or sedatives can affect your balance. · Limit the amount of alcohol you drink. Alcohol can impair your balance and other senses. · Ask your doctor whether calluses or corns on your feet need to be removed. If you wear loose-fitting shoes because of calluses or corns, you can lose your balance and fall. · Talk to your doctor if you have numbness in your feet. Preventing falls at home · Remove raised doorway thresholds, throw rugs, and clutter.  Repair loose carpet or raised areas in the floor. · Move furniture and electrical cords to keep them out of walking paths. · Use nonskid floor wax, and wipe up spills right away, especially on ceramic tile floors. · If you use a walker or cane, put rubber tips on it. If you use crutches, clean the bottoms of them regularly with an abrasive pad, such as steel wool. · Keep your house well lit, especially [facility], and outside walkways. Use night-lights in areas such as hallways and bathrooms. Add extra light switches or use remote switches (such as switches that go on or off when you clap your hands) to make it easier to turn lights on if you have to get up during the night. · Install sturdy handrails on stairways. · Move items in your cabinets so that the things you use a lot are on the lower shelves (about waist level). · Keep a cordless phone and a flashlight with new batteries by your bed. If possible, put a phone in each of the main rooms of your house, or carry a cell phone in case you fall and cannot reach a phone. Or, you can wear a device around your neck or wrist. You push a button that sends a signal for help. · Wear low-heeled shoes that fit well and give your feet good support. Use footwear with nonskid soles. Check the heels and soles of your shoes for wear. Repair or replace worn heels or soles. · Do not wear socks without shoes on wood floors. · Walk on the grass when the sidewalks are slippery. If you live in an area that gets snow and ice in the winter, sprinkle salt on slippery steps and sidewalks. Preventing falls in the bath · Install grab bars and nonskid mats inside and outside your shower or tub and near the toilet and sinks. · Use shower chairs and bath benches. · Use a hand-held shower head that will allow you to sit while showering.  
· Get into a tub or shower by putting the weaker leg in first. Get out of a tub or shower with your strong side first. 
 · Repair loose toilet seats and consider installing a raised toilet seat to make getting on and off the toilet easier. · Keep your bathroom door unlocked while you are in the shower. Where can you learn more? Go to http://kerline-bernabe.info/ Enter [id number] [id number] in the search box to learn more about \"Preventing Falls: Care Instructions. \" Current as of: August 7, 2019               Content Version: 12.5 © 6551-7871 [employer]. Care instructions adapted under license by [employer] (which disclaims liability or warranty for this information). If you have questions about a medical condition or this instruction, always ask your healthcare professional. [address] any warranty or liability for your use of this information.

## 2020-07-27 NOTE — PROGRESS NOTES
Chase Mccormick [facility] [address].  [address], [address]  [place], [address] [place] [address] Street  Phone: [phone]  Fax: [phone]        Dhara Palumbo  : 1933  PCP: Katie May MD    PROGRESS NOTE      ASSESSMENT AND PLAN    Diagnoses and all orders for this visit:    1. Spinal stenosis at L4-L5 level  -     gabapentin (NEURONTIN) 100 mg capsule; Take 1 Cap by mouth three (3) times daily as needed for Pain. Max Daily Amount: 300 mg. Indications: neuropathic pain      1. 80 y.o. male w/symptomatic LSS. Does better on low dose maintenance Gabapentin. 2. Advised to continue HEP  3. Please note that Gabapentin is a controlled substance in  [facility] as of 19. This is not a narcotic medication, but has some abuse and addiction potential, especially when combined with opioids. 4. Advised to not mix Mobic and Ibuprofen  5. Changed Gabapentin to BID-TID PRN  6. Given information on low back exercises and fall prevention    Follow-up and Dispositions    · Return for 6-8 months. HISTORY OF PRESENT ILLNESS  Paula Mcghee is a 80 y.o. male. Pt was last evaluated 2019 for lumbar spondylolisthesis. Gabapentin 100 mg BID PRN. Pt states that he had been doing well until around 6 mo ago when his back pain restarted. He reports that his walking distance is about 50-60 yards until his BLE become numb. Describes a short standing tolerance. Affirms stretching/exercise to try and strengthen his muscles. Denies side effects w/Gabapentin, reports benefit.     Pain Assessment  2020   Location of Pain Back;Leg   Location Modifiers Right   Severity of Pain 6   Quality of Pain (No Data)   Quality of Pain Comment numbness/weakness   Duration of Pain -   Frequency of Pain Constant   Aggravating Factors Walking   Aggravating Factors Comment -   Relieving Factors Rest   Relieving Factors Comment -   Result of Injury -       Does pain radiate into extremities: RLE pain  Numbness/tingling: BLE numbness with prolonged walking  Does patient have weakness: Denies weakness   Pt denies saddle paresthesias. Medications pt is on: Ibuprofen BID PRN with benefit for pain, none for numbness. Gabapentin 100mg PRN, not taking currently. Denies persistent fevers, chills, weight changes, neurogenic bowel or bladder symptoms.      Treatments patient has tried:  Physical therapy:Yes  Doing HEP: Yes  Non-opioid medications: Yes  Spinal injections: Yes LUISA L4-5 10/18 with benefit, previously 3/18 with 6 month benefit    Spinal surgery- No.     Last L CT 2019: listhesis and FA L4-5, lat recess stenosis      reviewed.  PMHx of pace maker placement. Pt enjoys playing golf and is able to continue. 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 PAST MEDICAL HISTORY   Past Medical History:   Diagnosis Date    Atrial fibrillation     paroxysmal   CHADS score 2  (-CHF, +HTN, +AGE, -DM, -CVA)    Back pain     Dyslipidemia     GERD     Hypertrension     Nephrolithiasis     Pacemaker     11/01,   9/09    [employer]    Sick sinus syndrome        Past Surgical History:   Procedure Laterality Date    CARDIAC SURG PROCEDURE UNLIST      Pacemaker    HX APPENDECTOMY      in his early 19's    HX CATARACT REMOVAL      1/07  bilateral    HX PACEMAKER     . MEDICATIONS      Current Outpatient Medications   Medication Sig Dispense Refill    gabapentin (NEURONTIN) 100 mg capsule Take 1 Cap by mouth three (3) times daily as needed for Pain. Max Daily Amount: 300 mg. Indications: neuropathic pain 90 Cap 5    metoprolol succinate (TOPROL-XL) 25 mg XL tablet TAKE 1 TABLET BY MOUTH ONCE DAILY  6    ibuprofen 100 mg tablet Take 200 mg by mouth every six (6) hours as needed for Pain.  azelastine (ASTELIN) 137 mcg (0.1 %) nasal spray USE 2 SPRAY(S) IN EACH NOSTRIL TWICE DAILY  11        Controlled Substance Monitoring:    No flowsheet data found.      ALLERGIES    Allergies   Allergen Reactions    Lisinopril Other (comments) Irregular heart rate    Other Plant, Animal, Environmental Other (comments)     Grass, dust, and maple trees causes congestion          SOCIAL HISTORY    Social History     Socioeconomic History    Marital status:      Spouse name: Not on file    Number of children: Not on file    Years of education: Not on file    Highest education level: Not on file   Occupational History    Not on file   Social Needs    Financial resource strain: Not on file    Food insecurity     Worry: Not on file     Inability: Not on file   [employer] needs     Medical: Not on file     Non-medical: Not on file   Tobacco Use    Smoking status: Never Smoker    Smokeless tobacco: Never Used   Substance and Sexual Activity    Alcohol use: Yes     Alcohol/week: 4.2 standard drinks     Types: 5 Glasses of wine per week    Drug use: No    Sexual activity: Not on file   Lifestyle    Physical activity     Days per week: Not on file     Minutes per session: Not on file    Stress: Not on file   Relationships    Social connections     Talks on phone: Not on file     Gets together: Not on file     Attends Alevism service: Not on file     Active member of club or organization: Not on file     Attends meetings of clubs or organizations: Not on file     Relationship status: Not on file    Intimate partner violence     Fear of current or ex partner: Not on file     Emotionally abused: Not on file     Physically abused: Not on file     Forced sexual activity: Not on file   Other Topics Concern    Not on file   Social History Narrative    Not on file       FAMILY HISTORY    Family History   Problem Relation Age of Onset    Other Father 39         from intestinal blockage in [de-identified]    Stroke Mother 68       REVIEW OF SYSTEMS  Review of Systems   Constitutional: Negative for chills, fever and weight loss. Respiratory: Negative for shortness of breath. Cardiovascular: Negative for chest pain.    Gastrointestinal: Negative for constipation. Negative for fecal incontinence   Genitourinary: Negative for dysuria. Negative for urinary incontinence   Musculoskeletal: Positive for back pain. Per HPI   Skin: Negative for rash. Neurological: Positive for tingling and sensory change. Negative for dizziness, tremors, focal weakness and headaches. Endo/Heme/Allergies: Does not bruise/bleed easily. Psychiatric/Behavioral: The patient does not have insomnia. PHYSICAL EXAMINATION  Visit Vitals  /75 (BP 1 Location: Right arm, BP Patient Position: Sitting)   Pulse 63   Temp 97.5 °F (36.4 °C) (Oral)   Ht 5' 8\" (1.727 m)   Wt 179 lb (81.2 kg)   SpO2 97%   BMI 27.22 kg/m²       Accompanied by family member. Constitutional:  Well developed, well nourished, in no acute distress. Psychiatric: Affect and mood are appropriate. Integumentary: No rashes or abrasions noted on exposed areas. Cardiovascular/Peripheral Vascular: No peripheral edema is noted BLE. SPINE/MUSCULOSKELETAL EXAM    Lumbar spine:  No rash, ecchymosis, or gross obliquity. No fasciculations. No focal atrophy is noted. Tenderness to palpation B/L L4-5. No tenderness to palpation at the sciatic notch. SI joints non-tender. Trochanters non tender. MOTOR:       Hip Flex  Quads Hamstrings Ankle DF EHL Ankle PF   Right +4/5 +4/5 +4/5 +4/5 +4/5 +4/5   Left +4/5 +4/5 +4/5 +4/5 +4/5 +4/5   Hamstring tightness R>L    Straight Leg raise negative. Ambulation without assistive device. FWB. Written by Joseph Buckner, as dictated by Rimma Conley MD.    I, Dr. Rimma Conley MD, confirm that all documentation is accurate. Mr. Melia Scott may have a reminder for a \"due or due soon\" health maintenance. I have asked that he contact his primary care provider for follow-up on this health maintenance.

## 2021-02-22 ENCOUNTER — OFFICE VISIT (OUTPATIENT)
Dept: ORTHOPEDIC SURGERY | Age: 86
End: 2021-02-22
Payer: MEDICARE

## 2021-02-22 VITALS
DIASTOLIC BLOOD PRESSURE: 68 MMHG | TEMPERATURE: 97.3 F | SYSTOLIC BLOOD PRESSURE: 133 MMHG | HEIGHT: 68 IN | OXYGEN SATURATION: 98 % | HEART RATE: 67 BPM | WEIGHT: 174 LBS | BODY MASS INDEX: 26.37 KG/M2

## 2021-02-22 DIAGNOSIS — R26.89 IMBALANCE: ICD-10-CM

## 2021-02-22 DIAGNOSIS — M48.062 LUMBAR STENOSIS WITH NEUROGENIC CLAUDICATION: Primary | ICD-10-CM

## 2021-02-22 PROCEDURE — 99214 OFFICE O/P EST MOD 30 MIN: CPT | Performed by: PHYSICAL MEDICINE & REHABILITATION

## 2021-02-22 PROCEDURE — G8427 DOCREV CUR MEDS BY ELIG CLIN: HCPCS | Performed by: PHYSICAL MEDICINE & REHABILITATION

## 2021-02-22 PROCEDURE — G8432 DEP SCR NOT DOC, RNG: HCPCS | Performed by: PHYSICAL MEDICINE & REHABILITATION

## 2021-02-22 PROCEDURE — 1101F PT FALLS ASSESS-DOCD LE1/YR: CPT | Performed by: PHYSICAL MEDICINE & REHABILITATION

## 2021-02-22 PROCEDURE — G8419 CALC BMI OUT NRM PARAM NOF/U: HCPCS | Performed by: PHYSICAL MEDICINE & REHABILITATION

## 2021-02-22 PROCEDURE — G8536 NO DOC ELDER MAL SCRN: HCPCS | Performed by: PHYSICAL MEDICINE & REHABILITATION

## 2021-02-22 RX ORDER — CETIRIZINE HCL 10 MG
10 TABLET ORAL DAILY
COMMUNITY

## 2021-02-22 RX ORDER — GABAPENTIN 100 MG/1
CAPSULE ORAL
Qty: 60 CAP | Refills: 5 | Status: SHIPPED | OUTPATIENT
Start: 2021-02-22 | End: 2021-09-01

## 2021-02-22 NOTE — PATIENT INSTRUCTIONS

## 2021-02-22 NOTE — PROGRESS NOTES
Verbal order entered per Dr. Reynaldo Majano as documented on blue sheet: 1. Physical therapy referral- lumbar stenosis 2. Gabapentin 100 mg - take 1 PO Q HS and one PO every day prn nerve pain.  #60 with 5 RF

## 2021-02-22 NOTE — PROGRESS NOTES
Glorious Batters presents today for   Chief Complaint   Patient presents with    Back Pain       Is someone accompanying this pt? Yes, male     Is the patient using any DME equipment during [address]? no    Depression Screening:  3 most recent PHQ Screens 3/10/2020   Little interest or pleasure in doing things Not at all   Feeling down, depressed, irritable, or hopeless Not at all   Total Score PHQ 2 0       Learning Assessment:  Learning Assessment 3/24/2014   PRIMARY LEARNER Patient   HIGHEST LEVEL OF EDUCATION - PRIMARY LEARNER  GRADUATED HIGH SCHOOL OR GED   BARRIERS PRIMARY LEARNER NONE   CO-LEARNER CAREGIVER No   PRIMARY LANGUAGE ENGLISH   LEARNER PREFERENCE PRIMARY READING   ANSWERED BY patient    RELATIONSHIP SELF       Abuse Screening:  Abuse Screening Questionnaire 9/10/2019   Do you ever feel afraid of your partner? N   Are you in a relationship with someone who physically or mentally threatens you? N   Is it safe for you to go home? Y       Fall Risk  Fall Risk Assessment, last 12 mths 3/10/2020   Able to walk? Yes   Fall in past 12 months? No   Number of falls in past 12 months -   Fall with injury? -         Coordination of Care:  1. Have you been to the ER, urgent care clinic since your last visit? no  Hospitalized since your last visit? no    2. Have you seen or consulted any other health care providers outside of the [address] [place] [address] since your last visit? no Include any pap smears or colon screening.  no

## 2021-02-22 NOTE — PROGRESS NOTES
Chase Mccormick [address]  [address], [address]  [place]  Phone: [phone]  Fax: [phone]        Rash[facility]  : 1933  PCP: Den Carr MD    PROGRESS NOTE      ASSESSMENT AND PLAN    Diagnoses and all orders for this visit:    1. Lumbar stenosis with neurogenic claudication  -     REFERRAL TO PHYSICAL THERAPY  -     gabapentin (NEURONTIN) 100 mg capsule; Take one PO QHS routine and one PO daily prn nerve pain. 2. Imbalance      1. 80 y.o. male w/symptomatic lumbar stenosis, was doing better while on Gabapentin. Concerned about balance and fall risk. 2. Advised to continue HEP  3. Referred to PT  4. Advised to avoid processed foods to reduce inflammation  5. Change Gabapentin to 100 mg QHS with a PRN dose  6. Decrease OTC Ibuprofen to PRN  7. Recommended Tylenol 650 mg TID PRN  8. Given information on low back exercises    Follow-up and Dispositions    · Return in about 6 months (around 2021), or if symptoms worsen or fail to improve. HISTORY OF PRESENT ILLNESS  Jose Dupont is a 80 y.o. male. Pt was last evaluated 2020 for lumbar stenosis. Changed Gabapentin to BID-TID PRN. Since his last visit, pt denies recent ED visits or hospitalizations. He reports that his back has constant soreness that restricts how much he can do. Pt complains that his numbness/tingling in the legs have increased. He reports that he has fallen as a result of the numbness as well. Walking tolerance 30-40 yards. Standing tolerance short. Denies problems with prolonged sitting. Positive shopping cart. He admits to losing his medications sometime when traveling for his golf tournaments. Denies having a cane at home. Pt states that his wife fell and broke her hip. Son w/him today.     Pain Assessment  2021   Location of Pain Back   Location Modifiers -   Severity of Pain 3   Quality of Pain Aching;Dull   Quality of Pain Comment -   Duration of Pain Persistent   Frequency of Pain Constant   Aggravating Factors Walking;Standing   Aggravating Factors Comment -   Limiting Behavior Some   Relieving Factors Other (Comment)   Relieving Factors Comment sitting, rest   Result of Injury No       Does pain radiate into extremities: RLE pain  Numbness/tingling: BLE numbness with prolonged walking  Does patient have weakness: No  Pt denies saddle paresthesias.    Denies persistent fevers, chills, weight changes, neurogenic bowel or bladder symptoms.      Treatments patient has tried:  Physical therapy:Yes  Doing HEP: Yes  Beneficial medications: Ibuprofen 200 mg qD-BID PRN. Gabapentin 100 mg BID-TID PRN. Failed medications: Yes  Spinal injections: Yes LUISA L4-5 10/18 with benefit, previously 3/18 with 6 month benefit     Spinal surgery- No.      L CT 2019: listhesis and FA L4-5, lat recess stenosis      reviewed. PMHx of pace maker placement. Pt enjoys playing golf and is able to continue. 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     PAST MEDICAL HISTORY   Past Medical History:   Diagnosis Date    Atrial fibrillation     paroxysmal   CHADS score 2  (-CHF, +HTN, +AGE, -DM, -CVA)    Back pain     Dyslipidemia     GERD     Hypertrension     Nephrolithiasis     Pacemaker     11/01,   9/09    [employer]    Sick sinus syndrome         Past Surgical History:   Procedure Laterality Date    HX APPENDECTOMY      in his early 19's    HX CATARACT REMOVAL      1/07  bilateral    HX PACEMAKER      [place] CARDIAC SURG PROCEDURE UNLIST      Pacemaker         MEDICATIONS      Current Outpatient Medications   Medication Sig Dispense Refill    cetirizine (ZyrTEC) 10 mg tablet Take 10 mg by mouth daily.  gabapentin (NEURONTIN) 100 mg capsule Take one PO QHS routine and one PO daily prn nerve pain.  60 Cap 5    azelastine (ASTELIN) 137 mcg (0.1 %) nasal spray USE 2 SPRAY(S) IN EACH NOSTRIL TWICE DAILY  11    metoprolol succinate (TOPROL-XL) 25 mg XL tablet TAKE 1 TABLET BY MOUTH ONCE DAILY  6    ibuprofen 100 mg tablet Take 200 mg by mouth every six (6) hours as needed for Pain. Controlled Substance Monitoring:    No flowsheet data found. ALLERGIES    Allergies   Allergen Reactions    Lisinopril Other (comments)     Irregular heart rate    Other Plant, Animal, Environmental Other (comments)     Grass, dust, and maple trees causes congestion          PHYSICAL EXAMINATION  Visit Vitals  /68 (BP 1 Location: Left upper arm, BP Patient Position: Sitting, BP Cuff Size: Adult)   Pulse 67   Temp 97.3 °F (36.3 °C) (Skin)   Ht 5' 8\" (1.727 m)   Wt 174 lb (78.9 kg)   SpO2 98% Comment: RA   BMI 26.46 kg/m²         Accompanied by family member, son. Constitutional:  Well developed, well nourished, in no acute distress. Psychiatric: Affect and mood are appropriate. Integumentary: No rashes or abrasions noted on exposed areas. Cardiovascular/Peripheral Vascular: No peripheral edema is noted BLE. SPINE/MUSCULOSKELETAL EXAM    Lumbar spine:  No rash, ecchymosis, or gross obliquity. No fasciculations. No focal atrophy is noted. Tenderness to palpation L4-5, mildly L sciatic notch. SI joints non-tender. Trochanters non tender. MOTOR:       Hip Flex  Quads Hamstrings Ankle DF EHL Ankle PF   Right +4/5 +4/5 +4/5 +4/5 +4/5 +4/5   Left +4/5 +4/5 +4/5 +4/5 +4/5 +4/5     DTRs are hypoactive patella. Straight Leg raise negative. Ambulation without assistive device. Difficulty w/turns. Wide base of support. Written by [employer], as dictated by Noemi Kelsey MD.    I, Dr. Noemi Kelsey MD, confirm that all documentation is accurate. Mr. Terrance Lester may have a reminder for a \"due or due soon\" health maintenance. I have asked that he contact his primary care provider for follow-up on this health maintenance.

## 2021-03-08 ENCOUNTER — HOSPITAL ENCOUNTER (OUTPATIENT)
Dept: PHYSICAL THERAPY | Age: 86
Discharge: HOME | End: 2021-03-08
Payer: MEDICARE

## 2021-03-08 PROCEDURE — 97162 PT EVAL MOD COMPLEX 30 MIN: CPT | Performed by: PHYSICAL THERAPIST

## 2021-03-08 PROCEDURE — 97535 SELF CARE MNGMENT TRAINING: CPT | Performed by: PHYSICAL THERAPIST

## 2021-03-08 NOTE — PROGRESS NOTES
PT DAILY TREATMENT NOTE     Patient Name: Fatoumata Spray  Date:3/10/2021  : 1933  [x]  Patient  Verified  [place] / Plan: [place] MED[place] & [place] / Product Type: Medicare /    In time:2:00P  Out time:2:35P  Total Treatment Time (min): 35min  Visit #: 1 of 10    Medicare/BCBS Only   Total Timed Codes (min):  20 1:1 Treatment Time:  20       Treatment Area: Low back pain [M54.5]    SUBJECTIVE  Pain Level (0-10 scale): 2/10  Any medication changes, allergies to medications, adverse drug reactions, diagnosis change, or new procedure performed?: [x] No    [] Yes (see summary sheet for update)  Subjective functional status/changes:   [] No changes reported    Patient lives with wife in single story home with 5 JUDY and bilateral handrails. Wife is currently at his son's house rehabilitating from a hip fracture, anticipated to return home by end of the month. She is there because if she were to fall, patient would not be able to help her up. Aggravating factors: progressive onset over the last year. Walking greater than 5 min creates tingling and numbness down the R leg. Takes about 15 min to subside once he is sitting.    Eases: sitting for 10-15 min after standing or walking, ibuprofen, the gabapentin    OBJECTIVE    15 min [x]Eval                  []Re-Eval       20 min Self Management to include HEP review  [x] See flow sheet :   Rationale: increase strength and improve coordination to improve the patients ability to improve independent movement in the home          With   [] TE   [] TA   [] neuro   [x] other: Patient Education: [x] Review HEP    [x] Progressed/Changed HEP based on:   [x] positioning   [] body mechanics   [] transfers   [] heat/ice application    [] other:      Other Objective/Functional Measures:     Slump Test: negative bilaterally    LSP A/ROM: restricted in all directions by 50%    5 Times Sit to Stand Test: 23 secs    Reflexes diminished at 1 in B LEs    MMT: 3/5 throughout    Gait: shuffled, wide KELL, decreased gait speed, forward trunk lean, decreased step length, minimal toe off and heel strike     Pain Level (0-10 scale) post treatment: 2/10    ASSESSMENT/Changes in Function: see POC    Patient will continue to benefit from skilled PT services to modify and progress therapeutic interventions, address functional mobility deficits, address ROM deficits, address strength deficits, analyze and address soft tissue restrictions, analyze and cue movement patterns, analyze and modify body mechanics/ergonomics, assess and modify postural abnormalities, address imbalance/dizziness and instruct in home and community integration to attain remaining goals.      [x]  See Plan of Care  []  See progress note/recertification  []  See Discharge Summary         Progress towards goals / Updated goals:  See POC    PLAN  [x]  Upgrade activities as tolerated     []  Continue plan of care  []  Update interventions per flow sheet       []  Discharge due to:_  []  Other:_      Mirian Haas PT 3/10/2021  2:21 PM    Future Appointments   Date Time Provider Sushil Raoi   3/11/2021  2:15 PM Demian Knapp, PTA [facility]   3/16/2021  2:15 PM Rosanna Haas, PT [facility]   3/18/2021  2:15 PM Anahi Monge, PTA [facility]   3/23/2021  2:15 PM Rosanna Haas, PT [facility]   3/25/2021  1:30 PM Rosanna Haas, PT [facility]   3/30/2021  2:15 PM Rosanna Haas, PT [facility]   4/1/2021  2:15 PM Mirian Haas, PT [facility]   4/6/2021  2:15 PM Mirian Haas, PT [facility]   4/8/2021  2:15 PM Sandhya Magaña, PT [facility]   4/13/2021  2:15 PM Sandhya Magaña, PT [facility]   4/15/2021  2:15 PM Sandhya Magaña, PT [facility]   8/23/2021 11:00 AM Charline Rosas MD [facility] AMB

## 2021-03-08 NOTE — PROGRESS NOTES
[facility]  [address] [facility], [address] [place] [address], [facility] [address]  [phone] fax      Plan of Care/ Statement of Necessity for Physical Therapy Services    Patient name: Peterson Yuan Start of Care: 3/8/2021   Referral source: Akilah Quintana MD : 1933    Medical Diagnosis: Low back pain [M54.5]  Payor: Beena Carey / Plan: [place] MED[place]ARE PART A & B / Product Type: Medicare /  Onset Date: 2020    Treatment Diagnosis: Low back pain, bilateral knees   Prior Hospitalization: see medical history Provider#: [id number]   Medications: Verified on Patient summary List    Comorbidities: back pain   Prior Level of Function: Patient reports I with ADL, golfing 1-2x/week, able to walk to the golf course independently. The Plan of Care and following information is based on the information from the initial evaluation. Assessment/ key information: Patient is a pleasant older adult male with sub-acute onset of sub-acute low back with radiating R LE pain with prolonged walking >5min. Appears to be irritation of L4-5 nerve root with lumbar extension preference. Will continue to assess during follow up sessions. Treatment will consistent of graded activity exposure, HEP, PNE and discussion of lifestyle changes. Evaluation Complexity History MEDIUM  Complexity : 1-2 comorbidities / personal factors will impact the outcome/ POC ; Examination LOW Complexity : 1-2 Standardized tests and measures addressing body structure, function, activity limitation and / or participation in recreation  ;Presentation MEDIUM Complexity : Evolving with changing characteristics  ; Clinical Decision Making MEDIUM Complexity : FOTO score of 26-74  Overall Complexity Rating: MEDIUM  Problem List: pain affecting function, decrease ROM, decrease strength, edema affecting function, impaired gait/ balance, decrease ADL/ functional abilitiies, decrease activity tolerance, decrease flexibility/ joint mobility and decrease transfer abilities   Treatment Plan may include any combination of the following: Therapeutic exercise, Therapeutic activities, Neuromuscular re-education, Physical agent/modality, Gait/balance training, Manual therapy, Patient education, Self Care training, Functional mobility training, Home safety training, Stair training and Other: PNE  Patient / Family readiness to learn indicated by: asking questions, trying to perform skills and interest  Persons(s) to be included in education: patient (P) and family support person (FSP);list Son   Barriers to Learning/Limitations: None  Patient Goal (s): I want to be able to walk [place].   Patient Self Reported Health Status: good  Rehabilitation Potential: good    Short Term Goals: To be accomplished in 3 weeks:  1. I with HEP   Eval: established. Long Term Goals: To be accomplished in 6 weeks:  1. Patient will be able to perform 10 sit to stands without UEs in 30 secs. Eval: unable  2. Patient will be able to transfer on/off the floor without external support and without loss of balance. Eval: needs external support. 3. Patient will be able to walk 20 min without onset of R LE numbness and tingling. Frequency / Duration: Patient to be seen 2 times per week for 6 weeks. Patient/ Caregiver education and instruction: Diagnosis, prognosis, self care, activity modification, brace/ splint application, exercises and other PNE and HEP   [x]  Plan of care has been reviewed with PTA    Certification Period: 3/8/21 - 4/6/21  Rosanna Haas, PT 3/8/2021 2:20 PM    ________________________________________________________________________    I certify that the above Therapy Services are being furnished while the patient is under my care. I agree with the treatment plan and certify that this therapy is necessary.     Physician's Signature:____________Date:_________TIME:________     Angel Hopkins MD  ** Signature, Date and Time must be completed for valid certification **    Please sign and return to [facility] [address]  [address] [place] [address], [address] [place] [address], [address] [place] [address] [address]  [phone] fax

## 2021-03-11 ENCOUNTER — HOSPITAL ENCOUNTER (OUTPATIENT)
Dept: PHYSICAL THERAPY | Age: 86
Discharge: HOME | End: 2021-03-11
Payer: MEDICARE

## 2021-03-11 PROCEDURE — 97110 THERAPEUTIC EXERCISES: CPT

## 2021-03-11 PROCEDURE — 97530 THERAPEUTIC ACTIVITIES: CPT

## 2021-03-11 PROCEDURE — 97112 NEUROMUSCULAR REEDUCATION: CPT

## 2021-03-11 NOTE — PROGRESS NOTES
PT DAILY TREATMENT NOTE     Patient Name: Sharyn Philippe  Date:3/11/2021  : 1933  [x]  Patient  Verified  Payor: [place] MED[place]ARE / Plan: [place] MEDICARE PART A & B / Product Type: Medicare /    In time:2:23  Out time:3:05  Total Treatment Time (min): 52  Visit #: 2 of 10    Medicare/BCBS Only   Total Timed Codes (min):  52 1:1 Treatment Time:  52       Treatment Area: Low back pain [M54.5]    SUBJECTIVE  Pain Level (0-10 scale): 4-5  Any medication changes, allergies to medications, adverse drug reactions, diagnosis change, or new procedure performed?: [x] No    [] Yes (see summary sheet for update)  Subjective functional status/changes:   [] No changes reported  \"\"Some stiffness. \"    OBJECTIVE    Modality rationale: decrease edema, decrease inflammation, decrease pain, increase tissue extensibility and increase muscle contraction/control to improve the patients ability to perform ADL    Min Type Additional Details    [] Estim:  []Unatt       []IFC  []Premod                        []Other:  []w/ice   []w/heat  Position:  Location:    [] Estim: []Att    []TENS instruct  []NMES                    []Other:  []w/US   []w/ice   []w/heat  Position:  Location:    []  Traction: [] Cervical       []Lumbar                       [] Prone          []Supine                       []Intermittent   []Continuous Lbs:  [] before manual  [] after manual    []  Ultrasound: []Continuous   [] Pulsed                           []1MHz   []3MHz W/cm2:  Location:    []  Iontophoresis with dexamethasone         Location: [] Take home patch   [] In clinic    []  Ice     []  heat  []  Ice massage  []  Laser   []  Anodyne Position:  Location:    []  Laser with stim  []  Other:  Position:  Location:    []  Vasopneumatic Device Pressure:       [] lo [] med [] hi   Temperature: [] lo [] med [] hi   [x] Skin assessment post-treatment:  [x]intact []redness- no adverse reaction    []redness  adverse reaction:      min []Eval []Re-Eval       22 min Therapeutic Exercise:  [x] See flow sheet :   Rationale: increase ROM, increase strength and improve coordination to improve the patients ability to perform ADL    10 min Therapeutic Activity:  [x]  See flow sheet :   Rationale: increase ROM, increase strength and improve coordination  to improve the patients ability to perform ADL      15 min Neuromuscular Re-education:  [x]  See flow sheet :   Rationale: increase ROM, increase strength, improve coordination, improve balance and increase proprioception  to improve the patients ability to perform ADL      min Manual Therapy: The manual therapy interventions were performed at a separate and distinct time from the therapeutic activities interventions. Rationale: decrease pain, increase ROM, increase tissue extensibility, decrease edema , decrease trigger points and increase postural awareness to perform ADL     5 min Gait Training:  _100__ feet with _no__ device on level surfaces with __SBA_ level of assist   Rationale: With   [x] TE   [] TA   [] neuro   [] other: Patient Education: [x] Review HEP    [] Progressed/Changed HEP based on:   [] positioning   [] body mechanics   [] transfers   [] heat/ice application    [] other:      Other Objective/Functional Measures:      Pain Level (0-10 scale) post treatment:  0    ASSESSMENT/Changes in Function:  Pt reports benefit with Back bends. Pt completed each there ex fairly well with cues. Pt presents with tightness along the spine. Following there ex pain was resolved but pt felt little numbness at left LE during ambulation.     Patient will continue to benefit from skilled PT services to address functional mobility deficits, address ROM deficits, address strength deficits, analyze and address soft tissue restrictions, analyze and cue movement patterns, analyze and modify body mechanics/ergonomics, assess and modify postural abnormalities and instruct in home and community integration to attain remaining goals. [x]  See Plan of Care  []  See progress note/recertification  []  See Discharge Summary         Progress towards goals / Updated goals:  1. I with HEP              Eval: established. Current: progressing 3/11  Long Term Goals: To be accomplished in 6 weeks:  1. Patient will be able to perform 10 sit to stands without UEs in 30 secs. Eval: unable    2. Patient will be able to transfer on/off the floor without external support and without loss of balance. Eval: needs external support.   3. Patient will be able to walk 20 min without onset of R LE numbness and tingling.       PLAN  [x]  Upgrade activities as tolerated     []  Continue plan of care  []  Update interventions per flow sheet       []  Discharge due to:_  []  Other:_      Anni Lucero PTA 3/11/2021  2:20 PM    Future Appointments   Date Time Provider Sushil Dong   3/16/2021  2:15 PM Aurelia , PT [facility]   3/18/2021  2:15 PM Anahi Monge, PTA [facility]   3/23/2021  2:15 PM Rosanna Haas, PT [facility]   3/25/2021  1:30 PM Balaji Haas, PT [facility]   3/30/2021  2:15 PM Rosanna Haas, PT [facility]   4/1/2021  2:15 PM Balaji Haas, PT [facility]   4/6/2021  2:15 PM Balaji Haas, PT [facility]   4/8/2021  2:15 PM Aurelia , PT [facility]   4/13/2021  2:15 PM Aurelia , PT [facility]   4/15/2021  2:15 PM Aurelia Sosa, PT [facility]   8/23/2021 11:00 AM Latha Larsen MD [facility]

## 2021-03-16 ENCOUNTER — HOSPITAL ENCOUNTER (OUTPATIENT)
Dept: PHYSICAL THERAPY | Age: 86
Discharge: HOME | End: 2021-03-16
Payer: MEDICARE

## 2021-03-16 PROCEDURE — 97530 THERAPEUTIC ACTIVITIES: CPT | Performed by: PHYSICAL THERAPIST

## 2021-03-16 PROCEDURE — 97110 THERAPEUTIC EXERCISES: CPT | Performed by: PHYSICAL THERAPIST

## 2021-03-16 PROCEDURE — 97140 MANUAL THERAPY 1/> REGIONS: CPT | Performed by: PHYSICAL THERAPIST

## 2021-03-16 NOTE — PROGRESS NOTES
PT DAILY TREATMENT NOTE     Patient Name: Sharyle Comas  Date:3/16/2021  : 1933  [x]  Patient  Verified  Payor: [place] MEDICARE / Plan: [place] MEDICARE PART A & B / Product Type: Medicare /    In time:2:15P  Out time:3:00P  Total Treatment Time (min): 45min  Visit #: 3 of 10    Medicare/BCBS Only   Total Timed Codes (min):  45 1:1 Treatment Time:  45       Treatment Area: Low back pain [M54.5]    SUBJECTIVE  Pain Level (0-10 scale): 3/10  Any medication changes, allergies to medications, adverse drug reactions, diagnosis change, or new procedure performed?: [x] No    [] Yes (see summary sheet for update)  Subjective functional status/changes:   [] No changes reported  Patient states he feels that there isn't really any big change in his symptoms yet, but also feels it is too soon to tell. OBJECTIVE     15 min Therapeutic Exercise:  [x] See flow sheet :   Rationale: increase ROM and increase strength to improve the patients ability to A/ROM and decrease pain with movement. 15 min Therapeutic Activity:  [x]  See flow sheet :   Rationale: increase strength and improve coordination  to improve the patients ability to Tolerate basic ADLs and household-related tasks without pain. Spent time working on transfers with proper body positioning and cuing. 15 min Manual Therapy:  Grade 2-3 mobs to LSP in prone with pillow under hips in P/A direction   The manual therapy interventions were performed at a separate and distinct time from the therapeutic activities interventions. Rationale: increase ROM and increase tissue extensibility to the patients ability to perform functional tasks such as overhead reach.           With   [x] TE   [x] TA   [x] neuro   [] other: Patient Education: [x] Review HEP    [x] Progressed/Changed HEP based on:   [x] positioning   [] body mechanics   [] transfers   [] heat/ice application    [] other:      Other Objective/Functional Measures: onset of numbness with Leg press; able to get it to dissipate within 1 min with seated anterior pelvic tilts. Patient continues to have limited tolerance to lumbar extension     Pain Level (0-10 scale) post treatment: 0/10    ASSESSMENT/Changes in Function: Patient is progressing slowly towards goals of increased activity tolerance, decreased pain. Educated on attempting exercises first thing in the morning. Recommend initiating walking program in the home and discussing that next visit, as will as initiating components of floor transfers for falls preparedness training. Patient will continue to benefit from skilled PT services to modify and progress therapeutic interventions, address functional mobility deficits, address ROM deficits, address strength deficits, analyze and address soft tissue restrictions, analyze and cue movement patterns, analyze and modify body mechanics/ergonomics, assess and modify postural abnormalities, address imbalance/dizziness and instruct in home and community integration to attain remaining goals. [x]  See Plan of Care  []  See progress note/recertification  []  See Discharge Summary         Progress towards goals / Updated goals:  1. I with HEP              Eval: established. Current: progressing 3/11 MET 3/16/21  Long Term Goals: To be accomplished in 6 weeks:  1. Patient will be able to perform 10 sit to stands without UEs in 30 secs.             Eval: unable    2. Patient will be able to transfer on/off the floor without external support and without loss of balance.              Eval: needs external support.   3. Patient will be able to walk 20 min without onset of R LE numbness and tingling.       PLAN  [x]  Upgrade activities as tolerated     []  Continue plan of care  []  Update interventions per flow sheet       []  Discharge due to:_  []  Other:_      Carlos Enrique Gonzalez PT 3/16/2021  4:57 PM    Future Appointments   Date Time Provider Sushil Dong   3/18/2021  2:15 PM Anahi Monge PTA [facility] 3/23/2021  2:15 PM Afentakis, Merlinda Bass, PT [facility]   3/25/2021  1:30 PM Afentakis, Merlinda Bass, PT [facility]   3/30/2021  2:15 PM Afentakis, Merlinda Bass, PT [facility]   4/1/2021  2:15 PM Afentakis, Merlinda Bass, PT [facility]   4/6/2021  2:15 PM Afentakis, Merlinda Bass, PT [facility]   4/8/2021  2:15 PM Ashli Li, PT [facility]   4/13/2021  2:15 PM Ashli Li, PT [facility]   4/15/2021  2:15 PM Ashliamber Li, PT [facility]   8/23/2021 11:00 AM Akilah Quintana MD [facility] AMB

## 2021-03-23 ENCOUNTER — HOSPITAL ENCOUNTER (OUTPATIENT)
Dept: PHYSICAL THERAPY | Age: 86
Discharge: HOME | End: 2021-03-23
Payer: MEDICARE

## 2021-03-23 PROCEDURE — 97112 NEUROMUSCULAR REEDUCATION: CPT | Performed by: PHYSICAL THERAPIST

## 2021-03-23 PROCEDURE — 97110 THERAPEUTIC EXERCISES: CPT | Performed by: PHYSICAL THERAPIST

## 2021-03-23 PROCEDURE — 97530 THERAPEUTIC ACTIVITIES: CPT | Performed by: PHYSICAL THERAPIST

## 2021-03-23 NOTE — PROGRESS NOTES
PT DAILY TREATMENT NOTE     Patient Name: Peterson Yuan  Date:3/23/2021  : 1933  [x]  Patient  Verified  Payor: [place] MEDICARE / Plan: VA MEDICARE PART A & B / Product Type: Medicare /    In time:2:10P  Out time:2:48P  Total Treatment Time (min): 38min  Visit #: 4 of 12    Medicare/BCBS Only   Total Timed Codes (min):  38 1:1 Treatment Time:  38       Treatment Area: Low back pain [M54.5]    SUBJECTIVE  Pain Level (0-10 scale): 2/10  Any medication changes, allergies to medications, adverse drug reactions, diagnosis change, or new procedure performed?: [x] No    [] Yes (see summary sheet for update)  Subjective functional status/changes:   [] No changes reported  Patient states he is feeling pretty good, hasn't really had too much pain. OBJECTIVE      10 min Therapeutic Exercise:  [x]? See flow sheet :   Rationale: increase ROM and increase strength to improve the patients ability to A/ROM and decrease pain with movement.     15 min Therapeutic Activity:  [x]? See flow sheet :   Rationale: increase strength and improve coordination  to improve the patients ability to Tolerate basic ADLs and household-related tasks without pain. Spent time working on transfers with proper body positioning and cuing. 13 min Neuromuscular Re-education:  [x]? See flow sheet :   Rationale: increase ROM, increase strength, improve coordination, improve balance and increase proprioception  to improve the patients ability to perform ADL                                                       With   [x]? TE   [x]? TA   [x]? neuro   []? other: Patient Education: [x]? Review HEP    [x]? Progressed/Changed HEP based on:   [x]? positioning   []? body mechanics   []? transfers   []? heat/ice application    []?  other:       Other Objective/Functional Measures: LOB x1 with stair training       Pain Level (0-10 scale) post treatment: 1/10     ASSESSMENT/Changes in Function: Patient is progressing slowly towards goals of increased activity tolerance, decreased pain. Educated on attempting exercises first thing in the morning. Recommend initiating walking program in the home and discussing that next visit, as will as initiating components of floor transfers for falls preparedness training.      Patient will continue to benefit from skilled PT services to modify and progress therapeutic interventions, address functional mobility deficits, address ROM deficits, address strength deficits, analyze and address soft tissue restrictions, analyze and cue movement patterns, analyze and modify body mechanics/ergonomics, assess and modify postural abnormalities, address imbalance/dizziness and instruct in home and community integration to attain remaining goals. [x]? See Plan of Care  []? See progress note/recertification  []? See Discharge Summary         Progress towards goals / Updated goals:  1. I with HEP              Eval: established. Current: progressing 3/11 MET 3/16/21  Long Term Goals: To be accomplished in 6 weeks:  1. Patient will be able to perform 10 sit to stands without UEs in 30 secs.             Eval: unable     Current: 5 reps 3/23/21  2. Patient will be able to transfer on/off the floor without external support and without loss of balance.              Eval: needs external support. 3. Patient will be able to walk 20 min without onset of R LE numbness and tingling.     PLAN  [x]? Upgrade activities as tolerated     []? Continue plan of care  []? Update interventions per flow sheet       []? Discharge due to:_  []?   Other:_       Sundeep Peralta PT 3/23/2021  2:45 PM    Future Appointments   Date Time Provider Sushil Dong   3/25/2021  1:30 PM Garth Remedies, PT [facility]   3/30/2021  2:15 PM Garth Remedies, PT [facility]   4/1/2021  2:15 PM Garth Remedies, PT [facility]   4/6/2021  2:15 PM Lianna Haas Screws, PT [facility]   4/8/2021  2:15 PM Lianna Haas Screws, PT [facility]   4/13/2021  2:15 PM Nena Brink, PT [facility]   4/15/2021  2:15 PM Nena Brink, PT [facility]   8/23/2021 11:00 AM Natty Segovia MD [facility] AM[facility]

## 2021-03-25 ENCOUNTER — HOSPITAL ENCOUNTER (OUTPATIENT)
Dept: PHYSICAL THERAPY | Age: 86
Discharge: HOME | End: 2021-03-25
Payer: MEDICARE

## 2021-03-25 PROCEDURE — 97530 THERAPEUTIC ACTIVITIES: CPT | Performed by: PHYSICAL THERAPIST

## 2021-03-25 PROCEDURE — 97110 THERAPEUTIC EXERCISES: CPT | Performed by: PHYSICAL THERAPIST

## 2021-03-25 PROCEDURE — 97112 NEUROMUSCULAR REEDUCATION: CPT | Performed by: PHYSICAL THERAPIST

## 2021-03-25 NOTE — PROGRESS NOTES
PT DAILY TREATMENT NOTE     Patient Name: Indira Harris  Date:3/25/2021  : 1933  [x]  Patient  Verified  Payor: [place] MEDICARE / Plan: VA MEDICARE PART A & B / Product Type: Medicare /    In time:1:35P  Out time:2:17P  Total Treatment Time (min): 42min  Visit #: 5 of 12    Medicare/BCBS Only   Total Timed Codes (min):  42 1:1 Treatment Time:  40       Treatment Area: Low back pain [M54.5]    SUBJECTIVE  Pain Level (0-10 scale): 210  Any medication changes, allergies to medications, adverse drug reactions, diagnosis change, or new procedure performed?: [x] No    [] Yes (see summary sheet for update)  Subjective functional status/changes:   [] No changes reported  Patient states he feels like therapy is helping some; he is able to do a little more than when he started; still has the numbness onset after short walking distances. OBJECTIVE      17 min Therapeutic Exercise:  [x]? ? See flow sheet :   Rationale: increase ROM and increase strength to improve the patients ability to A/ROM and decrease pain with movement.     10 min Therapeutic Activity:  [x]? ?  See flow sheet :   Rationale: increase strength and improve coordination  to improve the patients ability to Tolerate basic ADLs and household-related tasks without pain. Spent time working on transfers with proper body positioning and cuing.                  13 min Neuromuscular Re-education:  [x]? ?  See flow sheet :   Rationale: increase ROM, increase strength, improve coordination, improve balance and increase proprioception  to improve the patients ability to perform ADL.                                                     With   [x]? ? TE   [x]? ? TA   [x]? ? neuro   []?? other: Patient Education: [x]? ? Review HEP    [x]? ? Progressed/Changed HEP based on:   [x]? ? positioning   []? ? body mechanics   []? ? transfers   []? ? heat/ice application    []? ? other:       Other Objective/Functional Measures: LOB x1 with stair training       Pain Level (0-10 scale) post treatment: 2/10     ASSESSMENT/Changes in Function: Patient is progressing slowly towards goals of increased activity tolerance, decreased pain. Educated on using a walker to help with increased walking endurance at this time. Practice push ups for floor transfers.       Patient will continue to benefit from skilled PT services to modify and progress therapeutic interventions, address functional mobility deficits, address ROM deficits, address strength deficits, analyze and address soft tissue restrictions, analyze and cue movement patterns, analyze and modify body mechanics/ergonomics, assess and modify postural abnormalities, address imbalance/dizziness and instruct in home and community integration to attain remaining goals.     [x]? ?  See Plan of Care  []? ?  See progress note/recertification  []? ?  See Discharge Summary         Progress towards goals / Updated goals:  1. I with HEP              Eval: established. Current: progressing 3/11 MET 3/16/21  Long Term Goals: To be accomplished in 6 weeks:  1. Patient will be able to perform 10 sit to stands without UEs in 30 secs.             Eval: unable                Current: 8 reps 3/25/21  2. Patient will be able to transfer on/off the floor without external support and without loss of balance.              Eval: needs external support. 3. Patient will be able to walk 20 min without onset of R LE numbness and tingling. Eval unable   Current: 2 min without walker, 3 min with walker 3/25/21     PLAN  [x]? ?  Upgrade activities as tolerated     []? ?  Continue plan of care  []? ?  Update interventions per flow sheet       []? ?  Discharge due to:_  []??  Other:_         Bijan Fabian, PT 3/25/2021  1:50 PM    Future Appointments   Date Time Provider Sushil Dong   3/30/2021  2:15 PM Maura Lebron, PT [facility]   4/1/2021  2:15 PM Maura Lebron, PT [facility]   4/6/2021  2:15 PM Maura Lebron, PT [facility]   4/8/2021 2:15 PM Treva Byers, PT [facility]   4/13/2021  2:15 PM Treva Byers, PT [facility]   4/15/2021  2:15 PM Treva Byers, PT [facility]   8/23/2021 11:00 AM Jocelyn Devine MD [facility] AMB

## 2021-04-01 ENCOUNTER — HOSPITAL ENCOUNTER (OUTPATIENT)
Dept: PHYSICAL THERAPY | Age: 86
Discharge: HOME | End: 2021-04-01
Payer: MEDICARE

## 2021-04-01 PROCEDURE — 97530 THERAPEUTIC ACTIVITIES: CPT | Performed by: PHYSICAL THERAPIST

## 2021-04-01 PROCEDURE — 97110 THERAPEUTIC EXERCISES: CPT | Performed by: PHYSICAL THERAPIST

## 2021-04-01 PROCEDURE — 97140 MANUAL THERAPY 1/> REGIONS: CPT | Performed by: PHYSICAL THERAPIST

## 2021-04-01 NOTE — PROGRESS NOTES
PT DAILY TREATMENT NOTE 11    Patient Name: Jovita Vincent  Date:2021  : 1933  [x]  Patient  Verified  Payor: VA MEDICARE / Plan: VA MEDICARE PART A & B / Product Type: Medicare /    In time:2:15P  Out time:2:55P  Total Treatment Time (min): 40min  Visit #: 6 of 12    Medicare/BCBS Only   Total Timed Codes (min):  40 1:1 Treatment Time:  40       Treatment Area: Low back pain [M54.5]    SUBJECTIVE  Pain Level (0-10 scale): 510  Any medication changes, allergies to medications, adverse drug reactions, diagnosis change, or new procedure performed?: [x] No    [] Yes (see summary sheet for update)  Subjective functional status/changes:   [] No changes reported  Patient states he feels pretty stiff today but otherwise okay. OBJECTIVE    10 min Therapeutic Exercise:  [x] See flow sheet :   Rationale: increase ROM and increase strength to improve the patients ability to A/ROM and decrease pain with movement. 15 min Therapeutic Activity:  [x]  See flow sheet :   Rationale: increase strength and improve coordination  to improve the patients ability to Tolerate basic ADLs and household-related tasks without pain. 15 min Manual Therapy:  Grade 2-3 mobs to LSP in prone with pillow under hips in P/A direction   The manual therapy interventions were performed at a separate and distinct time from the therapeutic activities interventions. Rationale: increase ROM and increase tissue extensibility to the patients ability to perform functional tasks such as overhead reach.           With   [x] TE   [x] TA   [x] neuro   [] other: Patient Education: [x] Review HEP    [x] Progressed/Changed HEP based on:   [x] positioning   [] body mechanics   [] transfers   [] heat/ice application    [] other:      Other Objective/Functional Measures: active lumbar extension: <10 degs     Pain Level (0-10 scale) post treatment: 6/10    ASSESSMENT/Changes in Function: Patient is progressing slowly towards goals of increased activity tolerance, decreased pain. Educated on the need to increase extensor muscle strength. Very limited lumbar extension continues. Patient will continue to benefit from skilled PT services to modify and progress therapeutic interventions, address functional mobility deficits, address ROM deficits, address strength deficits, analyze and address soft tissue restrictions, analyze and cue movement patterns, analyze and modify body mechanics/ergonomics, assess and modify postural abnormalities, address imbalance/dizziness and instruct in home and community integration to attain remaining goals. [x]  See Plan of Care  []  See progress note/recertification  []  See Discharge Summary         Progress towards goals / Updated goals:  Short Term Goals:   1. I with HEP              Eval: established. Current: progressing 3/11 MET 3/16/21  Long Term Goals: To be accomplished in 6 weeks:  1. Patient will be able to perform 10 sit to stands without UEs in 30 secs.             Eval: unable                Current: 8 reps 4/1/21  2. Patient will be able to transfer on/off the floor without external support and without loss of balance.              Eval: needs external support. 3. Patient will be able to walk 20 min without onset of R LE numbness and tingling.               Eval unable              Current: 2 min without walker, 3 min with walker 3/25/21    PLAN  [x]  Upgrade activities as tolerated     []  Continue plan of care  []  Update interventions per flow sheet       []  Discharge due to:_  []  Other:_      Levon Tan PT 4/1/2021  4:49 PM    Future Appointments   Date Time Provider Sushil Dong   4[facility]2021  [facility] [address]   4/8/2021  2:15 PM Holli Bates PTA [facility] [address]   4/13/2021  2:15 PM [facility] [address]   4/15/2021  2:15 PM [facility] [address]   8/23/2021 11:00 AM Valentin Sr MD [facility] AMB

## 2021-04-06 ENCOUNTER — HOSPITAL ENCOUNTER (OUTPATIENT)
Dept: PHYSICAL THERAPY | Age: 86
Discharge: HOME | End: 2021-04-06
Payer: MEDICARE

## 2021-04-06 PROCEDURE — 97110 THERAPEUTIC EXERCISES: CPT

## 2021-04-06 PROCEDURE — 97530 THERAPEUTIC ACTIVITIES: CPT

## 2021-04-06 NOTE — PROGRESS NOTES
PT DAILY TREATMENT NOTE     Patient Name: Yarely Espino  Date:2021  : 1933  [x]  Patient  Verified  Payor: [place] MED[place]ARE / Plan: [place] MED[place]ARE PART A & B / Product Type: Medicare /    In time:2:10  Out time:2:48  Total Treatment Time (min): 38  Visit #: 7 of 12    Medicare/BCBS Only   Total Timed Codes (min):  38 1:1 Treatment Time:  38       Treatment Area: Low back pain [M54.5]    SUBJECTIVE  Pain Level (0-10 scale): 2-3  Any medication changes, allergies to medications, adverse drug reactions, diagnosis change, or new procedure performed?: [x] No    [] Yes (see summary sheet for update)  Subjective functional status/changes:   [] No changes reported  Pt states he still has a hard time walking far enough to get everything he needs at the grocery store    OBJECTIVE    28 min Therapeutic Exercise:  [x] See flow sheet :   Rationale: increase ROM and increase strength to improve the patients ability to perform ADLs    10 min Therapeutic Activity:  [x]  See flow sheet :   Rationale: increase ROM, increase strength, improve coordination, improve balance and increase proprioception  to improve the patients ability to increase ease with daily tasks              With   [] TE   [] TA   [] neuro   [] other: Patient Education: [x] Review HEP    [] Progressed/Changed HEP based on:   [] positioning   [] body mechanics   [] transfers   [] heat/ice application    [] other:      Other Objective/Functional Measures: Added HS str at stairs, standing march and hip x 3 per flow sheet    incr'd LE numbness after ~15 min standing     Pain Level (0-10 scale) post treatment: 2    ASSESSMENT/Changes in Function: Pt performed ~ 15 min of standing therex before reporting alfred LE numbness. Cont'd decr'd amb tolerance for grocery shopping. Pt c/o lightheadedness after sitting up from prone, BP was taken 124/77mmHg.  Pt reports intermittent lightheadedness is normal for him, no HA, no SOB and was able to walk out of clinic independently, pt was encouraged to contact MD if symptoms worsen. Patient will continue to benefit from skilled PT services to modify and progress therapeutic interventions, address functional mobility deficits, address ROM deficits, address strength deficits, analyze and address soft tissue restrictions, analyze and cue movement patterns, analyze and modify body mechanics/ergonomics, assess and modify postural abnormalities and address imbalance/dizziness to attain remaining goals. []  See Plan of Care  []  See progress note/recertification  []  See Discharge Summary         Progress towards goals / Updated goals:  Short Term Goals:   1. I with HEP              Eval: established. Current: progressing 3/11 MET 3/16/21  Long Term Goals: To be accomplished in 6 weeks:  1. Patient will be able to perform 10 sit to stands without UEs in 30 secs.             Eval: unable                Current: 8 reps 4/1/21  2. Patient will be able to transfer on/off the floor without external support and without loss of balance.              Eval: needs external support.   3. Patient will be able to walk 20 min without onset of R LE numbness and tingling.              Eval unable              Current: 2 min without walker, 3 min with walker 3/25/21    PLAN  []  Upgrade activities as tolerated     [x]  Continue plan of care  []  Update interventions per flow sheet       []  Discharge due to:_  []  Other:_      Laquita Harris PTA 4/6/2021  2:00 PM    Future Appointments   Date Time Provider Sushil Dong   4/6/2021  2:15 PM Yaquelin Still [facility]   4/8/2021  2:15 PM Elizabeth Willingham PTA [facility]   4/13/2021  2:15 PM Carola Bonner, PT [facility]   4/15/2021  2:15 PM Carola Bonner, PT [facility]   8/23/2021 11:00 AM Ashley Jaramillo MD [facility]

## 2021-04-08 ENCOUNTER — HOSPITAL ENCOUNTER (OUTPATIENT)
Dept: PHYSICAL THERAPY | Age: 86
Discharge: HOME | End: 2021-04-08
Payer: MEDICARE

## 2021-04-08 PROCEDURE — 97530 THERAPEUTIC ACTIVITIES: CPT

## 2021-04-08 PROCEDURE — 97112 NEUROMUSCULAR REEDUCATION: CPT

## 2021-04-08 PROCEDURE — 97110 THERAPEUTIC EXERCISES: CPT

## 2021-04-08 NOTE — PROGRESS NOTES
PT DAILY TREATMENT NOTE     Patient Name: Marcela Cruz  Date:2021  : 1933  [x]  Patient  Verified  Payor: [place] MEDICARE / Plan: [place] MEDICARE PART A & B / Product Type: Medicare /    In time:217  Out time:302   Total Treatment Time (min): 45  Visit #: 1 of 8    Medicare/BCBS Only   Total Timed Codes (min):  45 1:1 Treatment Time:  45       Treatment Area: Low back pain [M54.5]    SUBJECTIVE  Pain Level (0-10 scale): 0  Any medication changes, allergies to medications, adverse drug reactions, diagnosis change, or new procedure performed?: [x] No    [] Yes (see summary sheet for update)  Subjective functional status/changes:   [] No changes reported  \"I am doing better than I was . I can walk further than I could the first day without stopping for a rest.. \"    OBJECTIVE     15 min Therapeutic Exercise:  [x] See flow sheet :   Rationale: increase ROM, increase strength and improve coordination to improve the patients ability to tolerate ADLS and activities    15 min Therapeutic Activity:  [x]  See flow sheet :   Rationale: increase ROM, increase strength and improve coordination  to improve the patients ability to tolerate ADLS and activities     15 min Neuromuscular Re-education:  []  See flow sheet :   Rationale: increase ROM, increase strength, improve coordination, improve balance and increase proprioception  to improve the patients ability to tolerate ADLs and activities           With   [] TE   [] TA   [] neuro   [] other: Patient Education: [x] Review HEP    [] Progressed/Changed HEP based on:   [] positioning   [] body mechanics   [] transfers   [] heat/ice application    [x] other: FOTO, POC , Recert     Other Objective/Functional Measures: VC exercise and tech FOTO 65     Pain Level (0-10 scale) post treatment: 0    ASSESSMENT/Changes in Function: tolerated well. Decrease pain and improved FOTO.  Improved endurance is reported    Patient will continue to benefit from skilled PT services to modify and progress therapeutic interventions, address functional mobility deficits, address ROM deficits, address strength deficits, analyze and address soft tissue restrictions, analyze and cue movement patterns, analyze and modify body mechanics/ergonomics, assess and modify postural abnormalities, address imbalance/dizziness and instruct in home and community integration to attain remaining goals. []  See Plan of Care  []  See progress note/recertification  []  See Discharge Summary         Progress towards goals / Updated goals:   Short Term Goals:   1. I with HEP              Eval: established. Current: progressing 3/11 MET 4/8  Long Term Goals: To be accomplished in 6 weeks:  1. Patient will be able to perform 10 sit to stands without UEs in 30 secs.             Eval: unable                Current: 2X10 total for exercises 4/8  2. Patient will be able to transfer on/off the floor without external support and without loss of balance.              Eval: needs external support. CURRENT NA 4/8  3.  Patient will be able to walk 20 min without onset of R LE numbness and tingling.              Eval unable              Current: 2 min without walker, 3 min with walker 3/25/21  NA 4/8       PLAN  [x]  Upgrade activities as tolerated     [x]  Continue plan of care  []  Update interventions per flow sheet       []  Discharge due to:_  []  Other:_      Amy Lambert PT 4/8/2021  2:29 PM    Future Appointments   Date Time Provider Sushil Dong   4/13/2021  2:15 PM Francisco Craen PT [facility]   4/15/2021  2:15 PM Francisco Crane PT [facility]   8/23/2021 11:00 AM Clau Lucero MD [facility]

## 2021-04-13 ENCOUNTER — HOSPITAL ENCOUNTER (OUTPATIENT)
Dept: PHYSICAL THERAPY | Age: 86
Discharge: HOME | End: 2021-04-13
Payer: MEDICARE

## 2021-04-13 PROCEDURE — 97110 THERAPEUTIC EXERCISES: CPT | Performed by: PHYSICAL THERAPIST

## 2021-04-13 PROCEDURE — 97530 THERAPEUTIC ACTIVITIES: CPT | Performed by: PHYSICAL THERAPIST

## 2021-04-13 PROCEDURE — 97112 NEUROMUSCULAR REEDUCATION: CPT | Performed by: PHYSICAL THERAPIST

## 2021-04-13 NOTE — PROGRESS NOTES
PT DAILY TREATMENT NOTE     Patient Name: Zuri Dixon  Date:2021  : 1933  [x]  Patient  Verified  Payor: [place] MEDICARE / Plan: [place] MEDICARE PART A & B / Product Type: Medicare /    In time:2:16P  Out time:3:05P  Total Treatment Time (min): 49min  Visit #: 2 of 8    Medicare/BCBS Only   Total Timed Codes (min):  49 1:1 Treatment Time:  45       Treatment Area: Low back pain [M54.5]    SUBJECTIVE  Pain Level (0-10 scale): 10  Any medication changes, allergies to medications, adverse drug reactions, diagnosis change, or new procedure performed?: [x] No    [] Yes (see summary sheet for update)  Subjective functional status/changes:   [] No changes reported  Patient states he hasn't been walking like he usually does - has been doing other things. Doesn't think he can walk [place] and make it back without numbness. OBJECTIVE    15 min Therapeutic Exercise:  [x] See flow sheet :   Rationale: increase ROM and increase strength to improve the patients ability to A/ROM and decrease pain with movement. 15 min Therapeutic Activity:  [x]  See flow sheet :   Rationale: increase strength and improve coordination  to improve the patients ability to Tolerate basic ADLs and household and recreational-related tasks without pain. 10 min Neuromuscular Re-education:  [x]  See flow sheet :   Rationale: improve coordination, improve balance and increase proprioception  to improve the patients ability to perform activities with good form and proprioception with tactile and verbal cuing appropriately.           With   [x] TE   [x] TA   [x] neuro   [] other: Patient Education: [x] Review HEP    [x] Progressed/Changed HEP based on:   [x] positioning   [] body mechanics   [] transfers   [] heat/ice application    [] other:      Other Objective/Functional Measures: A/ROM     Pain Level (0-10 scale) post treatment: 2/10    ASSESSMENT/Changes in Function: Patient states he feels that he is getting better. When he first started therapy he was unable to make it out to his car at the end of his session without having to sit down. Now he can make it without sitting. Today worked on floor transfers and addressing his fear related to the numbness that occurs with prolonged walking. Patient will continue to benefit from skilled PT services to modify and progress therapeutic interventions, address functional mobility deficits, address ROM deficits, address strength deficits, analyze and address soft tissue restrictions, analyze and cue movement patterns, analyze and modify body mechanics/ergonomics, assess and modify postural abnormalities, address imbalance/dizziness and instruct in home and community integration to attain remaining goals. [x]  See Plan of Care  []  See progress note/recertification  []  See Discharge Summary         Progress towards goals / Updated goals:  1. Patient will be able to perform 10 sit to stands without UEs in 30 secs.            RUDOLPH  2X10 total for exercises, NA for time 4/8  2. Patient will be able to transfer on/off the floor without external support and without loss of balance.           PN  NA, not initiated 4/8   Current: initiated, requires 6\" elevated support and stand by assistance to achieve full standing position. 4/13/21  3.  Patient will be able to walk 20 min without onset of R LE numbness and tingling.            PN  2 min without walker, 3 min with walker 3/25/21  NA, reports improving 4/8   Current: 4 min without walker 4/13/21    PLAN  [x]  Upgrade activities as tolerated     []  Continue plan of care  []  Update interventions per flow sheet       []  Discharge due to:_  []  Other:_      Kitty Rincon PT 4/13/2021  2:30 PM    Future Appointments   Date Time Provider Sushil Dong   4/15/2021  2:15 PM [facility]   8/23/2021 11:00 AM Mirian Walsh MD [facility]

## 2021-04-15 ENCOUNTER — HOSPITAL ENCOUNTER (OUTPATIENT)
Dept: PHYSICAL THERAPY | Age: 86
Discharge: HOME | End: 2021-04-15
Payer: MEDICARE

## 2021-04-15 PROCEDURE — 97112 NEUROMUSCULAR REEDUCATION: CPT | Performed by: PHYSICAL THERAPIST

## 2021-04-15 PROCEDURE — 97110 THERAPEUTIC EXERCISES: CPT | Performed by: PHYSICAL THERAPIST

## 2021-04-15 PROCEDURE — 97530 THERAPEUTIC ACTIVITIES: CPT | Performed by: PHYSICAL THERAPIST

## 2021-04-15 NOTE — PROGRESS NOTES
PT DAILY TREATMENT NOTE     Patient Name: Jenna Prasad  Date:4/15/2021  : 1933  [x]  Patient  Verified  Payor: [place] MEDICARE / Plan: [place] MEDICARE PART A & B / Product Type: Medicare /    In time:220P  Out time:2:58P  Total Treatment Time (min): 38min  Visit #: 3 of 8    Medicare/BCBS Only   Total Timed Codes (min):  38 1:1 Treatment Time:  38       Treatment Area: Low back pain [M54.5]    SUBJECTIVE  Pain Level (0-10 scale): 5/10  Any medication changes, allergies to medications, adverse drug reactions, diagnosis change, or new procedure performed?: [x] No    [] Yes (see summary sheet for update)  Subjective functional status/changes:   [] No changes reported  Patient states that this weather always makes his pain worse. OBJECTIVE  15 min Therapeutic Exercise:  [x]? See flow sheet :   Rationale: increase ROM and increase strength to improve the patients ability to A/ROM and decrease pain with movement.     8 min Therapeutic Activity:  [x]? See flow sheet :   Rationale: increase strength and improve coordination  to improve the patients ability to Tolerate basic ADLs and household and recreational-related tasks without pain. 10 min Neuromuscular Re-education:  [x]? See flow sheet :   Rationale: improve coordination, improve balance and increase proprioception  to improve the patients ability to perform activities with good form and proprioception with tactile and verbal cuing appropriately. With   [x]? TE   [x]? TA   [x]? neuro   []? other: Patient Education: [x]? Review HEP    [x]? Progressed/Changed HEP based on:   [x]? positioning   []? body mechanics   []? transfers   []? heat/ice application    []? other:       Other Objective/Functional Measures: noted improved posture with modified tandem exercises, LOB x1 after standing exercises with Mod A to recover, able to stand/move on feet for 20 min.    Pain Level (0-10 scale) post treatment: 3/10    ASSESSMENT/Changes in Function: Patient is progressing towards goals of increased activity tolerance but continues to have onset of numbness and R leg pain. Patient education on continued exercises and timing his activity for 15 min as this is prior to the onset of numbness that makes him loose his balance. Patient will continue to benefit from skilled PT services to modify and progress therapeutic interventions, address functional mobility deficits, address ROM deficits, address strength deficits, analyze and address soft tissue restrictions, analyze and cue movement patterns, analyze and modify body mechanics/ergonomics and assess and modify postural abnormalities to attain remaining goals. [x]  See Plan of Care  []  See progress note/recertification  []  See Discharge Summary         Progress towards goals / Updated goals:  1. Patient will be able to perform 10 sit to stands without UEs in 30 secs.            GY  [id number] total for exercises, NA for time 4/8   Current: 10x 4/15/21  2. Patient will be able to transfer on/off the floor without external support and without loss of balance.           PN  NA, not initiated 4/8              Current: initiated, requires 6\" elevated support and stand by assistance to achieve full standing position. 4/15/21  3.  Patient will be able to walk 20 min without onset of R LE numbness and tingling.            PN  2 min without walker, 3 min with walker 3/25/21  NA, reports improving 4/8              Current: 20 min standing upright activities 4/15/21    PLAN  [x]  Upgrade activities as tolerated     []  Continue plan of care  []  Update interventions per flow sheet       []  Discharge due to:_  []  Other:_      Candance Sandhoff, PT 4/15/2021  2:31 PM    Future Appointments   Date Time Provider Sushil Dong   4/20/2021  1:30 PM Victor Manuel Garza, PT [facility]   4/22/2021  2:15 PM Mary Jo Yancey, PT [facility]   4/27/2021  2:15 PM Samina Yulia Giron, PT [facility]   4/29/2021  2:15 PM Antonio Garcia, PT [facility]   8/23/2021 11:00 AM Eleonora Peng MD [facility]

## 2021-04-20 ENCOUNTER — HOSPITAL ENCOUNTER (OUTPATIENT)
Dept: PHYSICAL THERAPY | Age: 86
Discharge: HOME | End: 2021-04-20
Payer: MEDICARE

## 2021-04-20 PROCEDURE — 97110 THERAPEUTIC EXERCISES: CPT

## 2021-04-20 PROCEDURE — 97112 NEUROMUSCULAR REEDUCATION: CPT

## 2021-04-20 NOTE — PROGRESS NOTES
PT DAILY TREATMENT NOTE     Patient Name: Britta Cline  Date:2021  : 1933  [x]  Patient  Verified  Payor: [employer]uki [employer] / Plan: [place] MEDICARE PART A & B / Product Type: Medicare /    In time: 1:33   Out time: 2:11  Total Treatment Time (min): 38  Visit #: 4 of 8    Medicare/BCBS Only   Total Timed Codes (min):  38 1:1 Treatment Time: 38       Treatment Area: Low back pain [M54.5]    SUBJECTIVE  Pain Level (0-10 scale): 2  Any medication changes, allergies to medications, adverse drug reactions, diagnosis change, or new procedure performed?: [x] No    [] Yes (see summary sheet for update)  Subjective functional status/changes:   [] No changes reported  Pt reports he has a HA today from the pollen but his LBP is a 2/10. OBJECTIVE       12 min Therapeutic Exercise:  [x]? See flow sheet :   Rationale: increase ROM and increase strength to improve the patients ability to perform ADLs     26 min Neuromuscular Re-education:  [x]? See flow sheet : core/glute stability exercises. Rationale: improve coordination, improve balance and increase proprioception  to improve the patients ability to ambulate more safely. With   [x]? TE   [x]? TA   [x]? neuro   []? other: Patient Education: [x]? Review HEP    [x]? Progressed/Changed HEP based on:   [x]? positioning   []? body mechanics   []? transfers   []? heat/ice application    []? other:       Other Objective/Functional Measures: Added/Progressed exercises per flow sheet to improve core stability and strength. Limited B hip IR noted with IR clams. Pain Level (0-10 scale) post treatment: 1    ASSESSMENT/Changes in Function: Reported improvement in pain post session today. Pt fatigued quickly with lateral bandwalks today and his right>left foot tends to move outward with this exercise.  He reports he has noticed increased ambulation tolerance but continues to have numbness/weakness with prolonged ambulation. Continue POC as tolerated to improve ambulation today and strength. Patient will continue to benefit from skilled PT services to modify and progress therapeutic interventions, address functional mobility deficits, address ROM deficits, address strength deficits, analyze and address soft tissue restrictions, analyze and cue movement patterns, analyze and modify body mechanics/ergonomics and assess and modify postural abnormalities to attain remaining goals. []  See Plan of Care  []  See progress note/recertification  []  See Discharge Summary         Progress towards goals / Updated goals:  1. Patient will be able to perform 10 sit to stands without UEs in 30 secs.            NV  3Z95 total for exercises, NA for time 4/8   Current: 10x 4/15/21  2. Patient will be able to transfer on/off the floor without external support and without loss of balance.           PN  NA, not initiated 4/8              Current: initiated, requires 6\" elevated support and stand by assistance to achieve full standing position. 4/15/21  3.  Patient will be able to walk 20 min without onset of R LE numbness and tingling.            PN  2 min without walker, 3 min with walker 3/25/21  NA, reports improving 4/8              Current: 20 min standing upright activities 4/15/21    PLAN  [x]  Upgrade activities as tolerated     [x]  Continue plan of care  [x]  Update interventions per flow sheet       []  Discharge due to:_  []  Other:_      Mak Armenta, PT 4/20/2021  1:38 PM    Future Appointments   Date Time Provider Sushil Dong   4/22/2021  2:15 PM Christiana Heredia PT [facility]   4/27/2021  2:15 PM Christiana Heredia PT [facility]   4/29/2021  2:15 PM Christiana Heredia PT [facility]   8/23/2021 11:00 AM Patti Calderon MD [facility]

## 2021-04-22 ENCOUNTER — HOSPITAL ENCOUNTER (OUTPATIENT)
Dept: PHYSICAL THERAPY | Age: 86
Discharge: HOME | End: 2021-04-22
Payer: MEDICARE

## 2021-04-22 PROCEDURE — 97110 THERAPEUTIC EXERCISES: CPT

## 2021-04-22 PROCEDURE — 97530 THERAPEUTIC ACTIVITIES: CPT

## 2021-04-22 NOTE — PROGRESS NOTES
PT DAILY TREATMENT NOTE     Patient Name: Josie Field  Date:2021  : 1933  [x]  Patient  Verified  Payor: Lupe Michele / Plan: [place] MED[place] PART A & B / Product Type: Medicare /    In time: 2:17 Out time:3:01  Total Treatment Time (min): 46  Visit #: 5 of 8    Medicare/BCBS Only   Total Timed Codes (min):  46 1:1 Treatment Time:  46       Treatment Area: Low back pain [M54.5]    SUBJECTIVE  Pain Level (0-10 scale): 2  Any medication changes, allergies to medications, adverse drug reactions, diagnosis change, or new procedure performed?: [x] No    [] Yes (see summary sheet for update)  Subjective functional status/changes:   [] No changes reported   \"Little pain is always there. \"    OBJECTIVE    Modality rationale: decrease edema, decrease inflammation, decrease pain, increase tissue extensibility and increase muscle contraction/control to improve the patients ability to perform ADL    Min Type Additional Details    [] Estim:  []Unatt       []IFC  []Premod                        []Other:  []w/ice   []w/heat  Position:  Location:    [] Estim: []Att    []TENS instruct  []NMES                    []Other:  []w/US   []w/ice   []w/heat  Position:  Location:    []  Traction: [] Cervical       []Lumbar                       [] Prone          []Supine                       []Intermittent   []Continuous Lbs:  [] before manual  [] after manual    []  Ultrasound: []Continuous   [] Pulsed                           []1MHz   []3MHz W/cm2:  Location:    []  Iontophoresis with dexamethasone         Location: [] Take home patch   [] In clinic    []  Ice     []  heat  []  Ice massage  []  Laser   []  Anodyne Position:  Location:    []  Laser with stim  []  Other:  Position:  Location:    []  Vasopneumatic Device Pressure:       [] lo [] med [] hi   Temperature: [] lo [] med [] hi   [x] Skin assessment post-treatment:  [x]intact []redness- no adverse reaction    []redness  adverse reaction:      min []Eval []Re-Eval       36 min Therapeutic Exercise:  [x] See flow sheet :   Rationale: increase ROM, increase strength and improve coordination to improve the patients ability to perform ADL     10 min Therapeutic Activity:  [x]  See flow sheet :   Rationale: increase ROM, increase strength and improve coordination  to improve the patients ability to perform ADL       min Neuromuscular Re-education:  []  See flow sheet :   Rationale: increase ROM, increase strength, improve coordination, improve balance and increase proprioception  to improve the patients ability to perform ADL      min Manual Therapy: The manual therapy interventions were performed at a separate and distinct time from the therapeutic activities interventions. Rationale: decrease pain, increase ROM, increase tissue extensibility, decrease edema , decrease trigger points and increase postural awareness to perform ADL     min Gait Training:  ___ feet with ___ device on level surfaces with ___ level of assist   Rationale: With   [x] TE   [] TA   [] neuro   [] other: Patient Education: [x] Review HEP    [] Progressed/Changed HEP based on:   [] positioning   [] body mechanics   [] transfers   [] heat/ice application    [] other:      Other Objective/Functional Measures:      Pain Level (0-10 scale) post treatment: 0    ASSESSMENT/Changes in Function: Pt was unable to tolerate second lap of lateral band walk due to pain at LSP. Overall pt responded fairly well to remaining there ex. Patient will continue to benefit from skilled PT services to address functional mobility deficits, address ROM deficits, address strength deficits, analyze and address soft tissue restrictions, analyze and cue movement patterns, analyze and modify body mechanics/ergonomics, assess and modify postural abnormalities and instruct in home and community integration to attain remaining goals.      [x]  See Plan of Care  []  See progress note/recertification  [] See Discharge Summary         Progress towards goals / Updated goals:    1. Patient will be able to perform 10 sit to stands without UEs in 30 secs.            QQ  1Y35 total for exercises, NA for time 4/8              Current: 10x 4/15/21  2. Patient will be able to transfer on/off the floor without external support and without loss of balance.           PN  NA, not initiated 4/8              Current: initiated, requires 6\" elevated support and stand by assistance to achieve full standing position. 4/15/21  3.  Patient will be able to walk 20 min without onset of R LE numbness and tingling.            PN  2 min without walker, 3 min with walker 3/25/21  NA, reports improving 4/8              Current: 20 min standing upright activities 4/22     PLAN  []  Upgrade activities as tolerated     [x]  Continue plan of care  []  Update interventions per flow sheet       []  Discharge due to:_  []  Other:_      Benjamen Khanna, PTA 4/22/2021  2:20 PM    Future Appointments   Date Time Provider Sushil Dong   4/27/2021  2:15 PM Ane Chris, PT [facility]   4/29/2021  2:15 PM Ane Chris, PT [facility]   8/23/2021 11:00 AM Susan Ulloa MD [facility]

## 2021-04-27 ENCOUNTER — HOSPITAL ENCOUNTER (OUTPATIENT)
Dept: PHYSICAL THERAPY | Age: 86
Discharge: HOME | End: 2021-04-27
Payer: MEDICARE

## 2021-04-27 PROCEDURE — 97110 THERAPEUTIC EXERCISES: CPT | Performed by: PHYSICAL THERAPIST

## 2021-04-27 PROCEDURE — 97530 THERAPEUTIC ACTIVITIES: CPT | Performed by: PHYSICAL THERAPIST

## 2021-04-27 PROCEDURE — 97112 NEUROMUSCULAR REEDUCATION: CPT | Performed by: PHYSICAL THERAPIST

## 2021-04-27 NOTE — PROGRESS NOTES
PT DAILY TREATMENT NOTE     Patient Name: Zeeshan Blakely  Date:2021  : 1933  [x]  Patient  Verified  Payor: Mary Gay / Plan: [place] MEDICARE PART A & B / Product Type: Medicare /    In time:2:15P  Out time:3:00P  Total Treatment Time (min): 45min  Visit #: 6 of 8    Medicare/BCBS Only   Total Timed Codes (min):  45 1:1 Treatment Time:  45       Treatment Area: Low back pain [M54.5]    SUBJECTIVE  Pain Level (0-10 scale): 210  Any medication changes, allergies to medications, adverse drug reactions, diagnosis change, or new procedure performed?: [x] No    [] Yes (see summary sheet for update)  Subjective functional status/changes:   [] No changes reported  Patient states he is feeling overall better. He can now walk from his car to the building without having to take a break. He can also now walk down around his condo to the mailbox without having to take rest to get back. Both of these things are improvement since starting therapy. OBJECTIVE  15 min Therapeutic Exercise:  [x]? ? See flow sheet :   Rationale: increase ROM and increase strength to improve the patients ability to A/ROM and decrease pain with movement.     8 min Therapeutic Activity:  [x]? ?  See flow sheet :   Rationale: increase strength and improve coordination  to improve the patients ability to Tolerate basic ADLs and household and recreational-related tasks without pain.      10 min Neuromuscular Re-education:  [x]? ?  See flow sheet :   Rationale: improve coordination, improve balance and increase proprioception  to improve the patients ability to perform activities with good form and proprioception with tactile and verbal cuing appropriately.     With   [x] TE   [x] TA   [x] neuro   [] other: Patient Education: [x] Review HEP    [x] Progressed/Changed HEP based on:   [x] positioning   [] body mechanics   [] transfers   [] heat/ice application    [] other:      Other Objective/Functional Measures: Able to stand without a rest for 21 min      Pain Level (0-10 scale) post treatment: 2/10    ASSESSMENT/Changes in Function: Patient is progressing towards goals of increased activity tolerance. Continues to need skilled service for specific exercise execution and performance of exercise and functional movement patterns to enhance home activities such as yardwork. Patient will continue to benefit from skilled PT services to modify and progress therapeutic interventions, address functional mobility deficits, address ROM deficits, address strength deficits, analyze and address soft tissue restrictions, analyze and cue movement patterns, analyze and modify body mechanics/ergonomics, assess and modify postural abnormalities, address imbalance/dizziness and instruct in home and community integration to attain remaining goals. [x]  See Plan of Care  []  See progress note/recertification  []  See Discharge Summary         Progress towards goals / Updated goals:  1. Patient will be able to perform 10 sit to stands without UEs in 30 secs.            IY  5I79 total for exercises, NA for time 4/8              Current: 10x in 30 secs 4/15/21  2. Patient will be able to transfer on/off the floor without external support and without loss of balance.           PN  NA, not initiated 4/8           Current: initiated, requires 6\" elevated support and stand by assistance to achieve full standing position. 4/15/21  3.  Patient will be able to walk 20 min without onset of R LE numbness and tingling.            PN  2 min without walker, 3 min with walker 3/25/21  NA, reports improving 4/8              Current: MET - able to tolerate 21 min standing upright activities 4/27/21    PLAN  [x]  Upgrade activities as tolerated     []  Continue plan of care  []  Update interventions per flow sheet       []  Discharge due to:_  []  Other:_      Kitty Rincon, PT 4/27/2021  3:07 PM    Future Appointments   Date Time Provider Sushil Dong   4/29/2021 2:15 PM Singh Lubin, PT [facility]   8/23/2021 11:00 AM Justin Peñaloza MD [facility]

## 2021-04-29 ENCOUNTER — APPOINTMENT (OUTPATIENT)
Dept: PHYSICAL THERAPY | Age: 86
End: 2021-04-29
Payer: MEDICARE

## 2021-05-03 ENCOUNTER — HOSPITAL ENCOUNTER (OUTPATIENT)
Dept: PHYSICAL THERAPY | Age: 86
Discharge: HOME | End: 2021-05-03
Payer: MEDICARE

## 2021-05-03 PROCEDURE — 97112 NEUROMUSCULAR REEDUCATION: CPT

## 2021-05-03 PROCEDURE — 97110 THERAPEUTIC EXERCISES: CPT

## 2021-05-03 PROCEDURE — 97530 THERAPEUTIC ACTIVITIES: CPT

## 2021-05-03 NOTE — PROGRESS NOTES
PT DAILY TREATMENT NOTE     Patient Name: Nimesh Ya  Date:5/3/2021  : 1933  [x]  Patient  Verified  Payor: [place] MEDICARE / Plan: [place] MEDICARE PART A & B / Product Type: Medicare /    In time:218  Out time:256  Total Treatment Time (min): 38  Visit #: 7 of 8    Medicare/BCBS Only   Total Timed Codes (min):  38 1:1 Treatment Time:  38       Treatment Area: Low back pain [M54.5]    SUBJECTIVE  Pain Level (0-10 scale): 2  Any medication changes, allergies to medications, adverse drug reactions, diagnosis change, or new procedure performed?: [x] No    [] Yes (see summary sheet for update)  Subjective functional status/changes:   [] No changes reported  \"I do some of these exercises at the gym . My back is a little sore today. It goes up with the humidity and the weather. That is normal. I always have some pain. \"    OBJECTIVE    Modality rationale:     Min Type Additional Details    [] Estim:  []Unatt       []IFC  []Premod                        []Other:  []w/ice   []w/heat  Position:  Location:    [] Estim: []Att    []TENS instruct  []NMES                    []Other:  []w/US   []w/ice   []w/heat  Position:  Location:    []  Traction: [] Cervical       []Lumbar                       [] Prone          []Supine                       []Intermittent   []Continuous Lbs:  [] before manual  [] after manual    []  Ultrasound: []Continuous   [] Pulsed                           []1MHz   []3MHz W/cm2:  Location:    []  Iontophoresis with dexamethasone         Location: [] Take home patch   [] In clinic   PD []  Ice     []  heat  []  Ice massage  []  Laser   []  Anodyne Position:  Location:    []  Laser with stim  []  Other:  Position:  Location:    []  Vasopneumatic Device Pressure:       [] lo [] med [] hi   Temperature: [] lo [] med [] hi   [] Skin assessment post-treatment:  []intact []redness- no adverse reaction    []redness  adverse reaction:          15 min Therapeutic Exercise:  [x] See flow sheet : Rationale: increase ROM, increase strength and improve coordination to improve the patients ability to tolerate ADLs and activities    15 min Therapeutic Activity:  [x]  See flow sheet :   Rationale: increase ROM, increase strength and improve coordination  to improve the patients ability to tolerate ADLS and activities     8 min Neuromuscular Re-education:  [x]  See flow sheet :   Rationale: increase ROM, increase strength, improve coordination, improve balance and increase proprioception  to improve the patients ability to tolerate ADLS and activities, falls prevention              With   [] TE   [] TA   [] neuro   [] other: Patient Education: [x] Review HEP    [] Progressed/Changed HEP based on:   [] positioning   [] body mechanics   [] transfers   [] heat/ice application    [] other:      Other Objective/Functional Measures: VC exercises  Decrease extensions on Mona to 15#. Pain Level (0-10 scale) post treatment: 2    ASSESSMENT/Changes in Function: reports overall having a bit more pain today and attributes to the weather. Patient request no SB today. Patient reports more fatigue today. Held IR and ER clams due to fatigue. Patient will continue to benefit from skilled PT services to modify and progress therapeutic interventions, address ROM deficits, address strength deficits, analyze and address soft tissue restrictions, analyze and cue movement patterns, analyze and modify body mechanics/ergonomics, assess and modify postural abnormalities, address imbalance/dizziness and instruct in home and community integration to attain remaining goals. []  See Plan of Care  []  See progress note/recertification  []  See Discharge Summary         Progress towards goals / Updated goals:  1. Patient will be able to perform 10 sit to stands without UEs in 30 secs.            HI  2P94 total for exercises, NA for time 4/8              Current: 10x in 30 secs 4/15/21 , NA 5/3/21  2.  Patient will be able to transfer on/off the floor without external support and without loss of balance.           PN  NA, not initiated 4/8           Current: initiated, requires 6\" elevated support and stand by assistance to achieve full standing position. 4/15/21 , NA 5/3/21  3.  Patient will be able to walk 20 min without onset of R LE numbness and tingling.            PN  2 min without walker, 3 min with walker 3/25/21  NA, reports improving 4/8              Current: MET - able to tolerate 21 min standing upright activities 4/27/21 met same report 5/3/21    PLAN  [x]  Upgrade activities as tolerated     [x]  Continue plan of care  []  Update interventions per flow sheet       []  Discharge due to:_  []  Other:_      Katia Shook, PT 5/3/2021  2:33 PM    Future Appointments   Date Time Provider Sushil Dong   5/6/2021  3:00 PM Stacy Engel, PT [facility]   5/11/2021  2:15 PM Dianelys Ohara, PT [facility]   5/13/2021  2:15 PM Antonio Garcia, PT [facility]   5/18/2021  1:30 PM Antonio Garcia, PT [facility]   5/20/2021  2:15 PM Stacy Engel, PT [facility]   5/25/2021  2:15 PM Stacy Maren, PT [facility]   5/27/2021  2:15 PM Stacy Congo, PT [facility]   8/23/2021 11:00 AM Eleonora Peng MD [facility]

## 2021-05-06 ENCOUNTER — HOSPITAL ENCOUNTER (OUTPATIENT)
Dept: PHYSICAL THERAPY | Age: 86
Discharge: HOME | End: 2021-05-06
Payer: MEDICARE

## 2021-05-06 PROCEDURE — 97530 THERAPEUTIC ACTIVITIES: CPT

## 2021-05-06 PROCEDURE — 97110 THERAPEUTIC EXERCISES: CPT

## 2021-05-06 PROCEDURE — 97112 NEUROMUSCULAR REEDUCATION: CPT

## 2021-05-06 NOTE — PROGRESS NOTES
PT DAILY TREATMENT NOTE     Patient Name: Ebony Hoffmann  Date:2021  : 1933  [x]  Patient  Verified  Payor: [place] MEDICARE / Plan: [place] MEDICARE PART A & B / Product Type: Medicare /    In time:308  Out time:348  Total Treatment Time (min): 40  Visit #: 8 of 8    Medicare/BCBS Only   Total Timed Codes (min):  40 1:1 Treatment Time:  40       Treatment Area: Low back pain [M54.5]    SUBJECTIVE  Pain Level (0-10 scale): 2  Any medication changes, allergies to medications, adverse drug reactions, diagnosis change, or new procedure performed?: [x] No    [] Yes (see summary sheet for update)  Subjective functional status/changes:   [] No changes reported  \"I am always about a 2. The doctor wanted to see if this would help and I think that it is helping me some. The stairs are what hurt me the most.\"     OBJECTIVE         17 min Therapeutic Exercise:  [x] See flow sheet :   Rationale: increase ROM, increase strength and improve coordination to improve the patients ability to tolerate ADLS and activities    15 min Therapeutic Activity:  [x]  See flow sheet :   Rationale: increase ROM, increase strength and improve coordination  to improve the patients ability to tolerate ADLs and activities     8 min Neuromuscular Re-education:  [x]  See flow sheet :   Rationale: increase ROM, increase strength and improve coordination  to improve the patients ability to tolerate ADLs and activities                With   [] TE   [] TA   [] neuro   [] other: Patient Education: [x] Review HEP    [] Progressed/Changed HEP based on:   [] positioning   [] body mechanics   [] transfers   [] heat/ice application    [] other:      Other Objective/Functional Measures: sit to stands 10X in 25 seconds no UE use     Pain Level (0-10 scale) post treatment: 2    ASSESSMENT/Changes in Function: tolerated well with increase pace with no UE and 10 sit to stands  Overall noting improvement with activities at home.      Patient will continue to benefit from skilled PT services to modify and progress therapeutic interventions, address ROM deficits, address strength deficits, analyze and address soft tissue restrictions, analyze and cue movement patterns, analyze and modify body mechanics/ergonomics, assess and modify postural abnormalities and instruct in home and community integration to attain remaining goals. [x]  See Plan of Care  [x]  See progress note/recertification  []  See Discharge Summary         Progress towards goals / Updated goals:  1. Patient will be able to perform 10 sit to stands without UEs in 30 secs.              9T11 total for exercises, NA for time 4/8              Current: 10X in 25 seconds without UE use 5/6/21  2. Patient will be able to transfer on/off the floor without external support and without loss of balance.           PN  NA, not initiated 4/8           Current: initiated, requires 6\" elevated support and stand by assistance to achieve full standing position. 4/15/21 , NA 5/3/21  3.  Patient will be able to walk 20 min without onset of R LE numbness and tingling.            PN  2 min without walker, 3 min with walker 3/25/21  NA, reports improving 4/8              Current: MET - able to tolerate 21 min standing upright activities 4/27/21 met same report 5/3/21    PLAN  [x]  Upgrade activities as tolerated     [x]  Continue plan of care  []  Update interventions per flow sheet       []  Discharge due to:_  [x]  Other:_send recert      Shiv Seat, PT 5/6/2021  3:27 PM    Future Appointments   Date Time Provider Sushil Dong   5/11/2021  2:15 PM Charu Carrier, PT [facility]   5/13/2021  2:15 PM Nisha Red, PT [facility]   5/18/2021  1:30 PM Nisha Red, PT [facility]   5/20/2021  2:15 PM Charu Carrier, PT [facility]   5/25/2021  2:15 PM Charu Carrier, PT [facility]   5/27/2021  2:15 PM Charu Carrier, PT [facility]   8/23/2021 11:00 AM Shanna Hayward MD [facility]

## 2021-05-06 NOTE — PROGRESS NOTES
[facility]  [address] [facility], [facility] [facility], [facility] [address] [address]  [phone] fax      Continued Plan of Care/ Re-certification for Physical Therapy Services    Patient name: John Olivarez Start of Care: 3/8/21   Referral source: Veronica Phan MD : 1933   Medical/Treatment Diagnosis: Low back pain [M54.5]  Payor: Michele Gil / Plan: [address] / Product Type: Medicare /  Onset Date:2020     Prior Hospitalization: see medical history Provider#: [id number]   Medications: Verified on Patient Summary List    Comorbidities: back pain   Prior Level of Function: Patient reports I with ADL, golfing 1-2x/week, able to walk to the golf course independently.                               Visits from Start of Care: 15    Missed Visits: 2    The Plan of Care and following information is based on the patient's current status:  Goal:Patient will be able to perform 10 sit to stands without UEs in 30 secs  Status at last note/certification:PN  2X10 total for exercises, NA for time   Current Status: met 10 X in  25 seconds and no UE use    Goal:Patient will be able to transfer on/off the floor without external support and without loss of balance  Status at last note/certification:PN  NA, not initiated   Current Status: not met, initiated, requires 6\" elevated support and stand by assistance to achieve full standing position.  4/15/21     Goal:Patient will be able to walk 20 min without onset of R LE numbness and tingling  Status at last note/certification:PN  2 min without walker, 3 min with walker 3/25/21  NA, reports improving   Current Status: met for able to tolerate 21 min standing upright activities ,          Key functional changes: increase tolerance to exercises, decrease pain      Problems/ barriers to goal attainment: residual pain and decrease core strength     Problem List: pain affecting function, decrease ROM, decrease strength, edema affecting function, decrease ADL/ functional abilitiies, decrease activity tolerance, decrease flexibility/ joint mobility and decrease transfer abilities    Treatment Plan: Therapeutic exercise, Therapeutic activities, Neuromuscular re-education, Physical agent/modality, Gait/balance training, Manual therapy, Patient education, Self Care training, Home safety training and Stair training     Patient Goal (s) has been updated and includes: get a little stronger     Goals for this certification period to be accomplished in 6 treatments:  1. Patient will be able to transfer on/off the floor without external support and without loss of balance.           PN  initiated, requires 6\" elevated support and stand by assistance to achieve full standing position. 2 patient will report overall 50% improvement with ADLS and regular activities at home. PN NA for % although reports improvement  3 patient will have tolerance to 8 inch stairs 3X  for carryover to  [employer] activities   PN 6 inches 4X    Frequency / Duration: Patient to be seen 2 times per week for 4 weeks:    Assessment / Recommendations:Patient will continue to benefit from skilled PT services to modify and progress therapeutic interventions, address ROM deficits, address strength deficits, analyze and address soft tissue restrictions, analyze and cue movement patterns, analyze and modify body mechanics/ergonomics, assess and modify postural abnormalities, address imbalance/dizziness and instruct in home and community integration to attain remaining goals. Certification Period: 5/7/21 - 6/5/21    Isaias Hutchinson, PT 5/6/2021 3:29 PM    ________________________________________________________________________  I certify that the above Therapy Services are being furnished while the patient is under my care. I agree with the treatment plan and certify that this therapy is necessary.     [] I have read the above and request that my patient continue as recommended.   [] I have read the above report and request that my patient continue therapy with the following changes/special instructions: _____________________________________________  [] I have read the above report and request that my patient be discharged from therapy    Physician's Signature:____________Date:_________TIME:________     Susan Ulloa MD  ** Signature, Date and Time must be completed for valid certification **    Please sign and return to [address]  [address] [place] [address], [address] [place] [address], [address] [place] [address],[facility] [address] [phone] fax

## 2021-05-11 ENCOUNTER — HOSPITAL ENCOUNTER (OUTPATIENT)
Dept: PHYSICAL THERAPY | Age: 86
Discharge: HOME | End: 2021-05-11
Payer: MEDICARE

## 2021-05-11 PROCEDURE — 97530 THERAPEUTIC ACTIVITIES: CPT

## 2021-05-11 PROCEDURE — 97110 THERAPEUTIC EXERCISES: CPT

## 2021-05-11 PROCEDURE — 97112 NEUROMUSCULAR REEDUCATION: CPT

## 2021-05-11 NOTE — PROGRESS NOTES
PT DAILY TREATMENT NOTE     Patient Name: Koki Wilson  Date:2021  : 1933  [x]  Patient  Verified  Payor: Brenda Ag / Plan: [place] & B / Product Type: Medicare /    In time:215  Out time:253  Total Treatment Time (min): 38  Visit #: 1 of 8    Medicare/BCBS Only   Total Timed Codes (min):  38 1:1 Treatment Time:  38       Treatment Area: Low back pain [M54.5]    SUBJECTIVE  Pain Level (0-10 scale): 2  Any medication changes, allergies to medications, adverse drug reactions, diagnosis change, or new procedure performed?: [x] No    [] Yes (see summary sheet for update)  Subjective functional status/changes:   [] No changes reported  \"I am doing pretty good today. \"    OBJECTIVE         15 min Therapeutic Exercise:  [x] See flow sheet :   Rationale: increase ROM, increase strength and improve coordination to improve the patients ability to tolerate ADLs and activities    15 min Therapeutic Activity:  [x]  See flow sheet :   Rationale: increase ROM, increase strength, improve coordination, improve balance and increase proprioception  to improve the patients ability to tolerate ADLS and activities     8 min Neuromuscular Re-education:  [x]  See flow sheet :   Rationale: increase ROM, increase strength, improve coordination, improve balance and increase proprioception  to improve the patients ability to tolerate ADLs and activities            With   [x] TE   [x] TA   [x] neuro   [] other: Patient Education: [x] Review HEP    [x] Progressed/Changed HEP based on:   [] positioning   [] body mechanics   [] transfers   [] heat/ice application    [] other:      Other Objective/Functional Measures: VC exercises and tech. Pain Level (0-10 scale) post treatment: 2    ASSESSMENT/Changes in Function: tolerated well   Residual LBP with reports of LE S/S with prolonged standing consistent with spinal stenosis. Tolerated exercises well.      Patient will continue to benefit from skilled PT services to modify and progress therapeutic interventions, address ROM deficits, address strength deficits, analyze and address soft tissue restrictions, analyze and cue movement patterns, analyze and modify body mechanics/ergonomics, assess and modify postural abnormalities, address imbalance/dizziness and instruct in home and community integration to attain remaining goals. [x]  See Plan of Care  [x]  See progress note/recertification  []  See Discharge Summary         Progress towards goals / Updated goals:   Goals for this certification period to be accomplished in 6 treatments:  1. Patient will be able to transfer on/off the floor without external support and without loss of balance.           PN  initiated, requires 6\" elevated support and stand by assistance to achieve full standing position. CURRENT ongoing 5/11/21  2 patient will report overall 50% improvement with ADLS and regular activities at home.                 PN NA for % although reports improvement   CURRENT improvement reported with residual back pain, no % given 5/11/21  3 patient will have tolerance to 8 inch stairs 3X  for carryover to  Community activities               PN 6 inches 4X   CURRENT 4X 8 inch with B rails and reciprocal pattern 5/11/21       PLAN  [x]  Upgrade activities as tolerated     [x]  Continue plan of care  []  Update interventions per flow sheet       []  Discharge due to:_  []  Other:_      Belvie Nageotte, PT 5/11/2021  2:29 PM    Future Appointments   Date Time Provider Sushil Dong   5/13/2021  2:15 PM Bright Solomon, PT [facility]   5/18/2021  1:30 PM Bright Solomon, PT [facility]   5/20/2021  2:15 PM Nii Hernando, PT [facility]   5/25/2021  2:15 PM Nii Anuj, PT [facility]   5/27/2021  2:15 PM Nii Hernando, PT [facility]   8/23/2021 11:00 AM Master Aleman MD [facility]

## 2021-05-13 ENCOUNTER — HOSPITAL ENCOUNTER (OUTPATIENT)
Dept: PHYSICAL THERAPY | Age: 86
Discharge: HOME | End: 2021-05-13
Payer: MEDICARE

## 2021-05-13 PROCEDURE — 97110 THERAPEUTIC EXERCISES: CPT | Performed by: PHYSICAL THERAPIST

## 2021-05-13 PROCEDURE — 97112 NEUROMUSCULAR REEDUCATION: CPT | Performed by: PHYSICAL THERAPIST

## 2021-05-13 PROCEDURE — 97530 THERAPEUTIC ACTIVITIES: CPT | Performed by: PHYSICAL THERAPIST

## 2021-05-13 NOTE — PROGRESS NOTES
PT DAILY TREATMENT NOTE     Patient Name: Baldemar Gonzalez  Date:2021  : 1933  [x]  Patient  Verified  Payor: [place] MEDICARE / Plan: [place] MEDICARE PART A & B / Product Type: Medicare /    In time:215  Out time:315  Total Treatment Time (min): 60min  Visit #: 2 of 8    Medicare/BCBS Only   Total Timed Codes (min):  60 1:1 Treatment Time:  60       Treatment Area: Low back pain [M54.5]    SUBJECTIVE  Pain Level (0-10 scale): 2/10  Any medication changes, allergies to medications, adverse drug reactions, diagnosis change, or new procedure performed?: [x] No    [] Yes (see summary sheet for update)  Subjective functional status/changes:   [] No changes reported  Patient states he was able to go [place] the golf [place] today    OBJECTIVE  30 min Therapeutic Exercise:  [x]? ?? See flow sheet :   Rationale: increase ROM and increase strength to improve the patients ability to A/ROM and decrease pain with movement.     15 min Therapeutic Activity:  [x]? ??  See flow sheet :   Rationale: increase strength and improve coordination  to improve the patients ability to Tolerate basic ADLs and household and recreational-related tasks without pain.      15 min Neuromuscular Re-education:  [x]? ??  See flow sheet :   Rationale: improve coordination, improve balance and increase proprioception  to improve the patients ability to perform activities with good form and proprioception with tactile and verbal cuing appropriately.     With   [x]? TE   [x]? TA   [x]? neuro   []? other: Patient Education: [x]? Review HEP    [x]? Progressed/Changed HEP based on:   [x]? positioning   []? body mechanics   []? transfers   []? heat/ice application    []? other:       Other Objective/Functional Measures: Able to transfer all the way to the floor 3 times    Pain Level (0-10 scale) post treatment: 0/10    ASSESSMENT/Changes in Function: Patient is progressing well towards goals of increased activity tolerance.       Patient will continue to benefit from skilled PT services to modify and progress therapeutic interventions, address functional mobility deficits, address ROM deficits, address strength deficits, analyze and address soft tissue restrictions, analyze and cue movement patterns, analyze and modify body mechanics/ergonomics, assess and modify postural abnormalities, address imbalance/dizziness and instruct in home and community integration to attain remaining goals. [x]  See Plan of Care  []  See progress note/recertification  []  See Discharge Summary         Progress towards goals / Updated goals:  Goals for this certification period to be accomplished in 6 treatments:  1. Patient will be able to transfer on/off the floor without external support and without loss of balance.           PN  initiated, requires 6\" elevated support and stand by assistance to achieve full standing position.                CURRENT Able once all the way to floor but loss of balance forward  2 patient will report overall 50% improvement with ADLS and regular activities at home.                PN NA for % although reports improvement              CURRENT improvement reported with residual back pain, no % given 5/11/21  3 patient will have tolerance to 8 inch stairs 3X  for carryover to  Community activities               PN 6 inches 4X              CURRENT 4X 8 inch with B rails and reciprocal pattern    PLAN  [x]  Upgrade activities as tolerated     []  Continue plan of care  []  Update interventions per flow sheet       []  Discharge due to:_  []  Other:_      Alex Galeano, PT 5/13/2021  3:03 PM    Future Appointments   Date Time Provider Sushil Dong   5/18/2021  1:30 PM Adams Cullen PT [facility]   5/20/2021  2:15 PM Tristan Cifuentes PTA [facility]   5/25/2021  2:15 PM Tacho Coates, PT [facility]   5/27/2021  2:15 PM Tacho Coates PT [facility]   8/23/2021 11:00 AM Omar Whitman MD [facility]

## 2021-05-18 ENCOUNTER — HOSPITAL ENCOUNTER (OUTPATIENT)
Dept: PHYSICAL THERAPY | Age: 86
Discharge: HOME | End: 2021-05-18
Payer: MEDICARE

## 2021-05-18 PROCEDURE — 97112 NEUROMUSCULAR REEDUCATION: CPT | Performed by: PHYSICAL THERAPIST

## 2021-05-18 PROCEDURE — 97530 THERAPEUTIC ACTIVITIES: CPT | Performed by: PHYSICAL THERAPIST

## 2021-05-18 PROCEDURE — 97110 THERAPEUTIC EXERCISES: CPT | Performed by: PHYSICAL THERAPIST

## 2021-05-18 NOTE — PROGRESS NOTES
PT DAILY TREATMENT NOTE     Patient Name: Baldemar Gonzalez  Date:2021  : 1933  [x]  Patient  Verified  Payor: iSa Florez / Plan: [place] MEDICARE PART A & B / Product Type: Medicare /    In time:1:35P  Out time:2:21P  Total Treatment Time (min): 46min  Visit #: 3 of 8    Medicare/BCBS Only   Total Timed Codes (min):  46 1:1 Treatment Time:  40       Treatment Area: Low back pain [M54.5]    SUBJECTIVE  Pain Level (0-10 scale): 2/10  Any medication changes, allergies to medications, adverse drug reactions, diagnosis change, or new procedure performed?: [x] No    [] Yes (see summary sheet for update)  Subjective functional status/changes:   [] No changes reported  Patient states he had a fall down an embankment of his yard while trying to trim his bushes. Was able to get himself up eventually without \"too much trouble. \"    OBJECTIVE  16 min Therapeutic Exercise:  [x]? ??? See flow sheet :   Rationale: increase ROM and increase strength to improve the patients ability to A/ROM and decrease pain with movement.     15 min Therapeutic Activity:  [x]? ???  See flow sheet :   Rationale: increase strength and improve coordination  to improve the patients ability to Tolerate basic ADLs and household and recreational-related tasks without pain.      15 min Neuromuscular Re-education:  [x]? ???  See flow sheet :   Rationale: improve coordination, improve balance and increase proprioception  to improve the patients ability to perform activities with good form and proprioception with tactile and verbal cuing appropriately.     With   [x]? ? TE   [x]? ? TA   [x]? ? neuro   []?? other: Patient Education: [x]? ? Review HEP    [x]? ? Progressed/Changed HEP based on:   [x]? ? positioning   []? ? body mechanics   []? ? transfers   []? ? heat/ice application    []? ? other:       Other Objective/Functional Measures: Able to stand without needing to sit for 15 min     Pain Level (0-10 scale) post treatment: 2/10    ASSESSMENT/Changes in Function: Patient is progressing slowly towards goals; somewhat limited by his own expectations and difficulty connecting exercises and movement performed in therapy with activities at home. Appears to have little to no motivation to increase his walking for exercise as he feels he is doing enough when he goes to [facility] [place] two time per week for total of 60 min of cardio only. Today, educated on the importance of strength training for prevention of osteoporosis and osteopenia with demonstration of understanding. Also demonstrated improvement with balance and catch step reaction after performing these training exercises today. Patient will continue to benefit from skilled PT services to modify and progress therapeutic interventions, address functional mobility deficits, address ROM deficits, address strength deficits, analyze and address soft tissue restrictions, analyze and cue movement patterns, analyze and modify body mechanics/ergonomics, assess and modify postural abnormalities, address imbalance/dizziness and instruct in home and community integration to attain remaining goals. [x]  See Plan of Care  []  See progress note/recertification  []  See Discharge Summary         Progress towards goals / Updated goals:  1.  Patient will be able to transfer on/off the floor without external support and without loss of balance.           PN  initiated, requires 6\" elevated support and stand by assistance to achieve full standing position.               CURRENT Progressing - Able once all the way to floor but loss of balance forward  2 patient will report overall 50% improvement with ADLS and regular activities at home.                PN NA for % although reports improvement              CURRENT progressing at about 30% improvement  3 patient will have tolerance to 8 inch stairs 3X  for carryover to  Community activities               PN 6 inches 4X              CURRENT 4X 8 inch with B rails and reciprocal pattern - progressing    PLAN  [x]  Upgrade activities as tolerated     []  Continue plan of care  []  Update interventions per flow sheet       []  Discharge due to:_  []  Other:_      Chely Dukes, PT 5/18/2021  1:45 PM    Future Appointments   Date Time Provider Sushil Dong   5/20/2021  2:15 PM Sarah Scott, DILCIA [facility]   5/25/2021  2:15 PM Sharifa Hercules, PT [facility]   5/27/2021  2:15 PM Sharifa Hercules, PT [facility]   8/23/2021 11:00 AM Ramona Mueller MD [facility]

## 2021-05-20 ENCOUNTER — HOSPITAL ENCOUNTER (OUTPATIENT)
Dept: PHYSICAL THERAPY | Age: 86
Discharge: HOME | End: 2021-05-20
Payer: MEDICARE

## 2021-05-20 PROCEDURE — 97112 NEUROMUSCULAR REEDUCATION: CPT | Performed by: PHYSICAL THERAPIST

## 2021-05-20 PROCEDURE — 97110 THERAPEUTIC EXERCISES: CPT | Performed by: PHYSICAL THERAPIST

## 2021-05-20 PROCEDURE — 97530 THERAPEUTIC ACTIVITIES: CPT | Performed by: PHYSICAL THERAPIST

## 2021-05-20 NOTE — PROGRESS NOTES
PT DAILY TREATMENT NOTE     Patient Name: Ely Ballard  Date:2021  : 1933  [x]  Patient  Verified  Payor: [place] MEDICARE / Plan: [place] MEDICARE PART A & B / Product Type: Medicare /    In time:2:18P  Out time:3:05P  Total Treatment Time (min): 47min  Visit #: 4 of 8    Medicare/BCBS Only   Total Timed Codes (min):  47 1:1 Treatment Time:  47       Treatment Area: Low back pain [M54.5]    SUBJECTIVE  Pain Level (0-10 scale): 2/10  Any medication changes, allergies to medications, adverse drug reactions, diagnosis change, or new procedure performed?: [x] No    [] Yes (see summary sheet for update)  Subjective functional status/changes:   [] No changes reported  Patient states he has not fallen since last session, planning to golf on . OBJECTIVE  16 min Therapeutic Exercise:  [x]? ???? See flow sheet :   Rationale: increase ROM and increase strength to improve the patients ability to A/ROM and decrease pain with movement.     15 min Therapeutic Activity:  [x]? ????  See flow sheet :   Rationale: increase strength and improve coordination  to improve the patients ability to Tolerate basic ADLs and household and recreational-related tasks without pain.      15 min Neuromuscular Re-education:  [x]? ????  See flow sheet :   Rationale: improve coordination, improve balance and increase proprioception  to improve the patients ability to perform activities with good form and proprioception with tactile and verbal cuing appropriately.     With   [x]? ?? TE   [x]? ?? TA   [x]? ?? neuro   []? ?? other: Patient Education: [x]??? Review HEP    [x]? ?? Progressed/Changed HEP based on:   [x]? ?? positioning   []? ?? body mechanics   []? ?? transfers   []? ?? heat/ice application    []? ?? other:       Other Objective/Functional Measures: Able to stand for 20 min then suddenly stated he needed to sit down      Pain Level (0-10 scale) post treatment: 1/10    ASSESSMENT/Changes in Function: Patient is progressing slowly towards improved balance and standing activity tolerance as well as transfers. Able to transfer from the ground without external assistance but loss of balance with catch step forward needing CGA to recover. Patient will continue to benefit from skilled PT services to modify and progress therapeutic interventions, address functional mobility deficits, address ROM deficits, address strength deficits, analyze and address soft tissue restrictions, analyze and cue movement patterns, analyze and modify body mechanics/ergonomics, assess and modify postural abnormalities and address imbalance/dizziness to attain remaining goals. [x]  See Plan of Care  []  See progress note/recertification  []  See Discharge Summary         Progress towards goals / Updated goals:  1.  Patient will be able to transfer on/off the floor without external support and without loss of balance.           PN  initiated, requires 6\" elevated support and stand by assistance to achieve full standing position.               CURRENT Progressing - Able once all the way to floor but loss of balance forward  2 patient will report overall 50% improvement with ADLS and regular activities at home.                PN NA for % although reports improvement              CURRENT progressing at about 30% improvement  3 patient will have tolerance to 8 inch stairs 3X  for carryover to [address] activities               PN 6 inches 4X              CURRENT 4X 8 inch with B rails and reciprocal pattern - progressing    PLAN  [x]  Upgrade activities as tolerated     []  Continue plan of care  []  Update interventions per flow sheet       []  Discharge due to:_  []  Other:_      Alejandra Bryan PT 5/20/2021  2:30 PM    Future Appointments   Date Time Provider Sushil Dong   5/25/2021  2:15 PM Jennette Bumpers, PT [facility]   5/27/2021  2:15 PM Jennette Bumpers, PT [facility]   8/23/2021 11:00 AM Guy Denson MD [facility]

## 2021-05-25 ENCOUNTER — HOSPITAL ENCOUNTER (OUTPATIENT)
Dept: PHYSICAL THERAPY | Age: 86
Discharge: HOME | End: 2021-05-25
Payer: MEDICARE

## 2021-05-25 PROCEDURE — 97530 THERAPEUTIC ACTIVITIES: CPT

## 2021-05-25 PROCEDURE — 97110 THERAPEUTIC EXERCISES: CPT

## 2021-05-25 PROCEDURE — 97112 NEUROMUSCULAR REEDUCATION: CPT

## 2021-05-25 NOTE — PROGRESS NOTES
PT DAILY TREATMENT NOTE     Patient Name: Omayra Palmer  Date:2021  : 1933  [x]  Patient  Verified  Payor: [place] MEDICARE / Plan: [place] MEDICARE PART A & B / Product Type: Medicare /    In time:216  Out time:257  Total Treatment Time (min): 41  Visit #: 5 of 8    Medicare/BCBS Only   Total Timed Codes (min):  41 1:1 Treatment Time:  41       Treatment Area: Low back pain [M54.5]    SUBJECTIVE  Pain Level (0-10 scale): 4-5  Any medication changes, allergies to medications, adverse drug reactions, diagnosis change, or new procedure performed?: [x] No    [] Yes (see summary sheet for update)  Subjective functional status/changes:   [] No changes reported  \"I am a little bit more sore today. \"    OBJECTIVE       21 min Therapeutic Exercise:  [x] See flow sheet :   Rationale: increase ROM, increase strength and improve coordination to improve the patients ability to tolerate ADLs and activities    10 min Therapeutic Activity:  [x]  See flow sheet :   Rationale: increase ROM, increase strength, improve coordination, improve balance and increase proprioception  to improve the patients ability to tolerate ADLS and activities     10 min Neuromuscular Re-education:  [x]  See flow sheet :   Rationale: increase ROM, increase strength, improve coordination, improve balance and increase proprioception  to improve the patients ability to tolerate ADLS and activities            With   [x] TE   [x] TA   [x] neuro   [] other: Patient Education: [x] Review HEP    [x] Progressed/Changed HEP based on:   [] positioning   [] body mechanics   [] transfers   [] heat/ice application    [] other:      Other Objective/Functional Measures: VC exercises and tech     Pain Level (0-10 scale) post treatment: 4-5    ASSESSMENT/Changes in Function: reports he is overall \"sore\" today. Tolerated all activities and exercises well. VC for step exercise.      Patient will continue to benefit from skilled PT services to modify and progress therapeutic interventions, address functional mobility deficits, address ROM deficits, address strength deficits, analyze and address soft tissue restrictions, analyze and cue movement patterns, analyze and modify body mechanics/ergonomics, assess and modify postural abnormalities, address imbalance/dizziness and instruct in home and community integration to attain remaining goals. [x]  See Plan of Care  [x]  See progress note/recertification  []  See Discharge Summary         Progress towards goals / Updated goals:   1. Patient will be able to transfer on/off the floor without external support and without loss of balance.           PN  initiated, requires 6\" elevated support and stand by assistance to achieve full standing position.               CURRENT Progressing - Able once all the way to floor but loss of balance forward, ongoing and NA today 5/25/21  2 patient will report overall 50% improvement with ADLS and regular activities at home.  ,               PN NA for % although reports improvement              CURRENT progressing at about 30% improvement, ongoing and NA today 5/25/21  3 patient will have tolerance to 8 inch stairs 3X  for carryover to [address] activities               PN 6 inches 4X              CURRENT 4X 8 inch with B rails and reciprocal pattern - progressing   6 inch step up and down Front and lateral. 2X10 each 5/25/21    PLAN  [x]  Upgrade activities as tolerated     [x]  Continue plan of care  []  Update interventions per flow sheet       []  Discharge due to:_  []  Other:_      Elba Harper PT 5/25/2021  2:34 PM    Future Appointments   Date Time Provider Sushil Dong   5/27/2021  2:15 PM Cheikh Galvez PT [facility] [address]   8/23/2021 11:00 AM Matt Baugh MD [facility]

## 2021-05-27 ENCOUNTER — HOSPITAL ENCOUNTER (OUTPATIENT)
Dept: PHYSICAL THERAPY | Age: 86
Discharge: HOME | End: 2021-05-27
Payer: MEDICARE

## 2021-05-27 PROCEDURE — 97110 THERAPEUTIC EXERCISES: CPT

## 2021-05-27 PROCEDURE — 97112 NEUROMUSCULAR REEDUCATION: CPT

## 2021-05-27 PROCEDURE — 97530 THERAPEUTIC ACTIVITIES: CPT

## 2021-06-03 ENCOUNTER — HOSPITAL ENCOUNTER (OUTPATIENT)
Dept: PHYSICAL THERAPY | Age: 86
Discharge: HOME | End: 2021-06-03
Payer: MEDICARE

## 2021-06-03 PROCEDURE — 97112 NEUROMUSCULAR REEDUCATION: CPT

## 2021-06-03 PROCEDURE — 97110 THERAPEUTIC EXERCISES: CPT

## 2021-06-03 PROCEDURE — 97530 THERAPEUTIC ACTIVITIES: CPT

## 2021-06-03 NOTE — PROGRESS NOTES
PT DAILY TREATMENT NOTE     Patient Name: Nimesh Ya  Date:6/3/2021  : 1933  [x]  Patient  Verified  Payor: [place] MEDICARE / Plan: [place] MEDICARE PART A & B / Product Type: Medicare /    In time:3:00  Out time:3:40  Total Treatment Time (min): 40  Visit #: 7 of 8    Medicare/BCBS Only   Total Timed Codes (min):  40 1:1 Treatment Time:  40       Treatment Area: Low back pain [M54.5]    SUBJECTIVE  Pain Level (0-10 scale): 2  Any medication changes, allergies to medications, adverse drug reactions, diagnosis change, or new procedure performed?: [x] No    [] Yes (see summary sheet for update)  Subjective functional status/changes:   [] No changes reported  \"Still some pain. \"    OBJECTIVE    Modality rationale: decrease edema, decrease inflammation, decrease pain, increase tissue extensibility and increase muscle contraction/control to improve the patients ability to perform ADL    Min Type Additional Details    [] Estim:  []Unatt       []IFC  []Premod                        []Other:  []w/ice   []w/heat  Position:  Location:    [] Estim: []Att    []TENS instruct  []NMES                    []Other:  []w/US   []w/ice   []w/heat  Position:  Location:    []  Traction: [] Cervical       []Lumbar                       [] Prone          []Supine                       []Intermittent   []Continuous Lbs:  [] before manual  [] after manual    []  Ultrasound: []Continuous   [] Pulsed                           []1MHz   []3MHz W/cm2:  Location:    []  Iontophoresis with dexamethasone         Location: [] Take home patch   [] In clinic    []  Ice     []  heat  []  Ice massage  []  Laser   []  Anodyne Position:  Location:    []  Laser with stim  []  Other:  Position:  Location:    []  Vasopneumatic Device  Pre-treatment girth:  Post-treatment girth:  Measured at (location):  Pressure:       [] lo [] med [] hi   Temperature: [] lo [] med [] hi   [x] Skin assessment post-treatment:  [x]intact []redness- no adverse reaction []redness  adverse reaction:      min []Eval                  []Re-Eval       22 min Therapeutic Exercise:  [x] See flow sheet :   Rationale: increase ROM, increase strength and improve coordination to improve the patients ability to perform ADL     10 min Therapeutic Activity:  [x]  See flow sheet :   Rationale: increase ROM, increase strength and improve coordination  to improve the patients ability to perform ADL      8 min Neuromuscular Re-education:  [x]  See flow sheet :   Rationale: increase ROM, increase strength, improve coordination, improve balance and increase proprioception  to improve the patients ability to perform ADL      min Manual Therapy: The manual therapy interventions were performed at a separate and distinct time from the therapeutic activities interventions. Rationale: decrease pain, increase ROM, increase tissue extensibility, decrease edema , decrease trigger points and increase postural awareness to perform ADL      min Gait Training:  ___ feet with ___ device on level surfaces with ___ level of assist   Rationale: With   [x] TE   [] TA   [] neuro   [] other: Patient Education: [x] Review HEP    [] Progressed/Changed HEP based on:   [] positioning   [] body mechanics   [] transfers   [] heat/ice application    [] other:      Other Objective/Functional Measures:      Pain Level (0-10 scale) post treatment: 0    ASSESSMENT/Changes in Function: Pt was challenged with tandem with right in front of left foot. Pt ambulated 200ft with good step length pt required  2 rest break due to (B) LE numbness. Pt completed each strengthening there ex fairly well.     Patient will continue to benefit from skilled PT services to address functional mobility deficits, address ROM deficits, address strength deficits, analyze and address soft tissue restrictions, analyze and cue movement patterns, analyze and modify body mechanics/ergonomics, assess and modify postural abnormalities and instruct in home and community integration to attain remaining goals.      [x]  See Plan of Care  []  See progress note/recertification  []  See Discharge Summary         Progress towards goals / Updated goals:    1. Patient will be able to transfer on/off the floor without external support and without loss of balance.           PN  initiated, requires 6\" elevated support and stand by assistance to achieve full standing position.               CURRENT Progressing - Able once all the way to floor but loss of balance forward, ongoing and NA today 5/27/21  2 patient will report overall 50% improvement with ADLS and regular activities at home. ,               PN NA for % although reports improvement              CURRENT progressing at about 30% improvement, ongoing and no % given today 5/27/21  3 patient will have tolerance to 8 inch stairs 3X  for carryover to [address] activities               PN 6 inches 4X              CURRENT 4X 8 inch with B rails and reciprocal pattern - progressing   6 inch step up and down Front and lateral. 2X10 each and stairs 6\" 4X 6/3/21    PLAN  [x]  Upgrade activities as tolerated     []  Continue plan of care  []  Update interventions per flow sheet       []  Discharge due to:_  []  Other:_      Anahi Monge PTA 6/3/2021  2:56 PM    Future Appointments   Date Time Provider Sushil Dong   6/3/2021  3:00 PM Jayce Vo PTA [facility]   6/4/2021  2:15 PM Jayce Vo PTA [facility]   8/23/2021 11:00 AM Jonnathan Benson MD [facility]

## 2021-06-04 ENCOUNTER — HOSPITAL ENCOUNTER (OUTPATIENT)
Dept: PHYSICAL THERAPY | Age: 86
Discharge: HOME | End: 2021-06-04
Payer: MEDICARE

## 2021-06-04 PROCEDURE — 97530 THERAPEUTIC ACTIVITIES: CPT

## 2021-06-04 NOTE — PROGRESS NOTES
Physical Therapy Discharge Instructions      [facility] [address], [address] [place] [address], [address]  [phone] fax    Patient: Jovita Vincent  : 1933      Continue Home Exercise Program 2 times per day for  weeks, then decrease to  times per week      Continue with    [] Ice  as needed  times per day     [x] Heat           Follow up with MD:     [x] Upon completion of therapy     [] As needed      Recommendations:     [x]   Return to activity with home program    []   Return to activity with the following modifications:       []Post Rehab Program    []Join Independent aquatic program     []Return to/join local gym        Additional Comments:   Thanks for all your hard work        [employer], [place] 2021 2:58 PM

## 2021-06-04 NOTE — PROGRESS NOTES
PT DAILY TREATMENT NOTE     Patient Name: Ne Payton  Date:2021  : 1933  [x]  Patient  Verified  Payor: VA MEDICARE / Plan: VA MEDICARE PART A & B / Product Type: Medicare /    In time:2:25  Out time:3:05  Total Treatment Time (min): 35  Visit #: 8 of 8    Medicare/BCBS Only   Total Timed Codes (min):  35 1:1 Treatment Time:  35       Treatment Area: Low back pain [M54.5]    SUBJECTIVE  Pain Level (0-10 scale): 2  Any medication changes, allergies to medications, adverse drug reactions, diagnosis change, or new procedure performed?: [x] No    [] Yes (see summary sheet for update)  Subjective functional status/changes:   [] No changes reported  \"Little pain. \"  OBJECTIVE    Modality rationale: decrease edema, decrease inflammation, decrease pain, increase tissue extensibility and increase muscle contraction/control to improve the patients ability to perform ADL   Min Type Additional Details    [] Estim:  []Unatt       []IFC  []Premod                        []Other:  []w/ice   []w/heat  Position:  Location:    [] Estim: []Att    []TENS instruct  []NMES                    []Other:  []w/US   []w/ice   []w/heat  Position:  Location:    []  Traction: [] Cervical       []Lumbar                       [] Prone          []Supine                       []Intermittent   []Continuous Lbs:  [] before manual  [] after manual    []  Ultrasound: []Continuous   [] Pulsed                           []1MHz   []3MHz W/cm2:  Location:    []  Iontophoresis with dexamethasone         Location: [] Take home patch   [] In clinic    []  Ice     []  heat  []  Ice massage  []  Laser   []  Anodyne Position:  Location:    []  Laser with stim  []  Other:  Position:  Location:    []  Vasopneumatic Device  Pre-treatment girth:  Post-treatment girth:  Measured at (location):  Pressure:       [] lo [] med [] hi   Temperature: [] lo [] med [] hi   [x] Skin assessment post-treatment:  [x]intact []redness- no adverse reaction []redness  adverse reaction:      min []Eval                  []Re-Eval        min Therapeutic Exercise:  [x] See flow sheet :   Rationale: increase ROM, increase strength and improve coordination to improve the patients ability to perform ADL     35 min Therapeutic Activity:  [x]  See flow sheet :   Rationale: increase ROM, increase strength and improve coordination  to improve the patients ability to perform ADL       min Neuromuscular Re-education:  []  See flow sheet :   Rationale: increase ROM, increase strength, improve coordination, improve balance and increase proprioception  to improve the patients ability to perform ADL      min Manual Therapy: The manual therapy interventions were performed at a separate and distinct time from the therapeutic activities interventions. Rationale: decrease pain, increase ROM, increase tissue extensibility, decrease edema , decrease trigger points and increase postural awareness to perform ADL      min Gait Training:  ___ feet with ___ device on level surfaces with ___ level of assist   Rationale: With   [] TE   [x] TA   [] neuro   [] other: Patient Education: [x] Review HEP    [] Progressed/Changed HEP based on:   [] positioning   [] body mechanics   [] transfers   [] heat/ice application    [x] other: REASSESS GOALS     Other Objective/Functional Measures: FOTO 52    Pain Level (0-10 scale) post treatment: 0    ASSESSMENT/Changes in Function: Mr. Bree Carreno reports 50% improvement. Pain level on average is 2 but when He get up the morning 3-4. Pt reports difficulty with standing and walking. FOTO has decreased 46 to 52 but pt reports he is better.     Patient will continue to benefit from skilled PT services to address functional mobility deficits, address ROM deficits, address strength deficits, analyze and address soft tissue restrictions, analyze and cue movement patterns, analyze and modify body mechanics/ergonomics, assess and modify postural abnormalities and instruct in home and community integration to attain remaining goals.      [x]  See Plan of Care  []  See progress note/recertification  []  See Discharge Summary         Progress towards goals / Updated goals:    1. Patient will be able to transfer on/off the floor without external support and without loss of balance.           PN  initiated, requires 6\" elevated support and stand by assistance to achieve full standing position.               CURRENT Progressing - Able once all the way to floor but loss of balance forward, ongoing and NA today 5/27/21  2 patient will report overall 50% improvement with ADLS and regular activities at home. ,               PN NA for50 % improvement  6/4              CURRENT progressing at about 30% improvement, ongoing and no % given today 5/27/21  3 patient will have tolerance to 8 inch stairs 3X  for carryover to [address] activities               PN 6 inches 4X              CURRENT 4X 8 inch with B rails and reciprocal pattern - progressing   6 inch step up and down Front and lateral. 2X10 each and stairs 6\" 4X 6/3/21       PLAN  []  Upgrade activities as tolerated     []  Continue plan of care  []  Update interventions per flow sheet       []  Discharge due to:_  []  Other:_ [facility], [facility] 6/4/2021  2:27 PM    Future Appointments   Date Time Provider Sushil Dong   8/23/2021 11:00 AM Luis Boothe MD [facility]

## 2021-06-08 NOTE — PROGRESS NOTES
[facility] [address], [address] [place] [address]  [place], [address] [place], [place] [address]  [phone] fax      Discharge Summary    Patient name: Yarely Espino     Start of Care: 3/8/21  Referral source: Patti Calderon MD    : 1933  Medical/Treatment Diagnosis: Low back pain [M54.5]  Payor: Edaurdo Ramírez / Plan: [employer] / Product Type: Medicare /        Onset Date:2020  Prior Hospitalization: see medical history   Provider#: [id number]  Comorbidities: back pain   Prior Level of Function: Patient reports I with ADL, golfing 1-2x/week, able to walk to the golf course independentlyMedications: Verified on Patient Summary List    Visits from Start of Care: 23    Missed Visits: 2  Reporting Period : 21 to 21      Summary of Care:patient seen for 23 skilled sessions. With residual pain 2/10. He has exercises to be used at home. He has responded well overall noting 50% improvement since initial evaluation. He is ready to attempt self management. Thank you. Goal:Patient will be able to transfer on/off the floor without external support and without loss of balance. Status at last note/certification: initiated, requires 6\" elevated support and stand by assistance to achieve full standing position. Status at discharge: not met,   Progressing - Able once all the way to floor but loss of balance forward,     Goal:patient will report overall 50% improvement with ADLS and regular activities at home.    Status at last note/certification:NA for %  Status at discharge: met  50 % improvement      Goal:patient will have tolerance to 8 inch stairs 3X  for carryover to  Community activities  Status at last note/certification: 6 inches 4 X  Status at discharge: not met  6 inch step up and down Front and lateral. 2X10 each and stairs 6\" 4X           ASSESSMENT/RECOMMENDATIONS:  [x]Discontinue therapy progressing towards or have reached established goals  []Discontinue therapy due to lack of appreciable progress towards goals  []Discontinue therapy due to lack of attendance or compliance  []Other:     Thank you for this referral.     Jayant Renner, PT 6/8/2021 7:54 AM

## 2021-08-18 ENCOUNTER — OFFICE VISIT (OUTPATIENT)
Dept: ORTHOPEDIC SURGERY | Age: 86
End: 2021-08-18
Payer: MEDICARE

## 2021-08-18 VITALS
HEIGHT: 68 IN | TEMPERATURE: 96.8 F | OXYGEN SATURATION: 98 % | HEART RATE: 69 BPM | DIASTOLIC BLOOD PRESSURE: 75 MMHG | WEIGHT: 165 LBS | SYSTOLIC BLOOD PRESSURE: 129 MMHG | BODY MASS INDEX: 25.01 KG/M2

## 2021-08-18 DIAGNOSIS — M48.062 LUMBAR STENOSIS WITH NEUROGENIC CLAUDICATION: Primary | ICD-10-CM

## 2021-08-18 DIAGNOSIS — M43.16 SPONDYLOLISTHESIS OF LUMBAR REGION: ICD-10-CM

## 2021-08-18 PROCEDURE — G8536 NO DOC ELDER MAL SCRN: HCPCS | Performed by: PHYSICAL MEDICINE & REHABILITATION

## 2021-08-18 PROCEDURE — 99214 OFFICE O/P EST MOD 30 MIN: CPT | Performed by: PHYSICAL MEDICINE & REHABILITATION

## 2021-08-18 PROCEDURE — 1101F PT FALLS ASSESS-DOCD LE1/YR: CPT | Performed by: PHYSICAL MEDICINE & REHABILITATION

## 2021-08-18 PROCEDURE — G8419 CALC BMI OUT NRM PARAM NOF/U: HCPCS | Performed by: PHYSICAL MEDICINE & REHABILITATION

## 2021-08-18 PROCEDURE — G8427 DOCREV CUR MEDS BY ELIG CLIN: HCPCS | Performed by: PHYSICAL MEDICINE & REHABILITATION

## 2021-08-18 PROCEDURE — G8432 DEP SCR NOT DOC, RNG: HCPCS | Performed by: PHYSICAL MEDICINE & REHABILITATION

## 2021-08-18 NOTE — PROGRESS NOTES
Paolo Vance presents today for   Chief Complaint   Patient presents with    Follow-up    Back Pain       Is someone accompanying this pt? yes    Is the patient using any DME equipment during OV? no    Depression Screening:  3 most recent PHQ Screens 3/10/2020   Little interest or pleasure in doing things Not at all   Feeling down, depressed, irritable, or hopeless Not at all   Total Score PHQ 2 0       Learning Assessment:  Learning Assessment 3/24/2014   PRIMARY LEARNER Patient   HIGHEST LEVEL OF EDUCATION - PRIMARY LEARNER  GRADUATED HIGH SCHOOL OR GED   BARRIERS PRIMARY LEARNER NONE   CO-LEARNER CAREGIVER No   PRIMARY LANGUAGE ENGLISH   LEARNER PREFERENCE PRIMARY READING   ANSWERED BY patient    RELATIONSHIP SELF       Abuse Screening:  Abuse Screening Questionnaire 9/10/2019   Do you ever feel afraid of your partner? N   Are you in a relationship with someone who physically or mentally threatens you? N   Is it safe for you to go home? Y       Fall Risk  Fall Risk Assessment, last 12 mths 3/10/2020   Able to walk? Yes   Fall in past 12 months? No   Number of falls in past 12 months -   Fall with injury? -       OPIOID RISK TOOL  No flowsheet data found. Coordination of Care:  1. Have you been to the ER, urgent care clinic since your last visit? no  Hospitalized since your last visit? no    2. Have you seen or consulted any other health care providers outside of the [address] [place] [address] since your last visit? no Include any pap smears or colon screening.  no

## 2021-08-18 NOTE — PROGRESS NOTES
[facility] [address], [address]  [place], [address] [place] [address] Street  Phone: [phone]  Fax: [phone]        Robbi Braswell  : 1933  PCP: Johnnie Kong MD    PROGRESS NOTE      ASSESSMENT AND PLAN    Diagnoses and all orders for this visit:    1. Lumbar stenosis with neurogenic claudication    2. Spondylolisthesis of lumbar region        1. Catarino Santa is a 80 y.o. male w/symptomatic LSS. 2. 1st line med. Patient may take Tylenol up to 2g (2,000 mg) every 24 hours for routine use. 3. Continue to take Ibuprofen with food as 2nd line med. 4. Gabapentin daytime as 3rd line med. qhs routine. 5. Avoid repetitive bending, lifting, and twisting  6. Patient does not require Gabapentin 100 mg today. May call for refill prior to next appointment. Follow-up and Dispositions    · Return in about 6 months (around 2022). HISTORY OF PRESENT ILLNESS      Catarino Santa is a 80 y.o. male presents for follow up of back pain. He reports an aching pain in the lumbar region. The pain is exacerbated with weather changes. He has intermittent numbness and tingling in his legs that decreases his walking tolerance. He notes balance issues when walking backwards but denies falling. Denies LE weakness. He is taking Ibuprofen, Gabapentin 100 mg QHS, and uses a heating pad with benefit. He uses a stationary bike, rowing machine, and light dumbbells for exercise. Denies red flags including persistent fevers, chills, weight changes, neurogenic bowel or bladder symptoms. Son asking about injections and sx.        Pain Assessment  2021   Location of Pain Back   Location Modifiers -   Severity of Pain 4   Quality of Pain Aching   Quality of Pain Comment stiffness   Duration of Pain -   Frequency of Pain Constant   Aggravating Factors -   Aggravating Factors Comment -   Limiting Behavior -   Relieving Factors (No Data)   Relieving Factors Comment medication   Result of Injury -         Treatments patient has tried:  Physical therapy:Yes  Doing HEP: Yes  Beneficial medications: Ibuprofen 200 mg qD-BID PRN. Gabapentin 100 mg BID-TID PRN. Failed medications: Yes  Spinal injections: Yes LUISA L4-5 10/18 with benefit, previously 3/18 with 6 month benefit     Spinal surgery- No.      L CT 2019: listhesis and FA L4-5, lat recess stenosis     PMHx of pace maker placement, COVID vaccine. Pt enjoys playing golf and is able to continue. 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        PHYSICAL EXAMINATION    Visit Vitals  /75 (BP 1 Location: Right arm, BP Patient Position: Sitting, BP Cuff Size: Adult)   Pulse 69   Temp 96.8 °F (36 °C) (Tympanic)   Ht 5' 8\" (1.727 m)   Wt 165 lb (74.8 kg)   SpO2 98%   BMI 25.09 kg/m²     LE strength intact  SLR negative  Tender left upper glute  No increase pain with hip ROM          Mr. Mayela Sanchez may have a reminder for a \"due or due soon\" health maintenance. I have asked that he contact his primary care provider for follow-up on this health maintenance. This note was created using Dragon transcription software, unintended errors may be present. Written by Keny Ozuna, as dictated by Lorraine Gilliam MD.  I, Dr. Lorraine Gilliam MD, confirm that all documentation is accurate.

## 2021-08-18 NOTE — LETTER
8/18/2021    Patient: Ellie Paulino   YOB: 1933   Date of Visit: 8/18/2021     Debbi Ceja, [address]    Dear Debbi Ceja MD,      Thank you for referring Mr. Malik Archuleta to [address] [place] [address] for evaluation. My notes for this consultation are attached. If you have questions, please do not hesitate to call me. I look forward to following your patient along with you.       Sincerely,    Aleks Lopez MD

## 2021-08-31 DIAGNOSIS — M48.062 LUMBAR STENOSIS WITH NEUROGENIC CLAUDICATION: ICD-10-CM

## 2021-09-01 RX ORDER — GABAPENTIN 100 MG/1
CAPSULE ORAL
Qty: 60 CAPSULE | Refills: 0 | Status: SHIPPED | OUTPATIENT
Start: 2021-09-01 | End: 2022-03-03 | Stop reason: SDUPTHER

## 2022-03-03 ENCOUNTER — OFFICE VISIT (OUTPATIENT)
Dept: ORTHOPEDIC SURGERY | Age: 87
End: 2022-03-03
Payer: MEDICARE

## 2022-03-03 VITALS
BODY MASS INDEX: 25.01 KG/M2 | WEIGHT: 165 LBS | TEMPERATURE: 97.6 F | HEART RATE: 71 BPM | OXYGEN SATURATION: 100 % | HEIGHT: 68 IN

## 2022-03-03 DIAGNOSIS — M48.062 LUMBAR STENOSIS WITH NEUROGENIC CLAUDICATION: Primary | ICD-10-CM

## 2022-03-03 DIAGNOSIS — M43.16 SPONDYLOLISTHESIS OF LUMBAR REGION: ICD-10-CM

## 2022-03-03 PROCEDURE — 99214 OFFICE O/P EST MOD 30 MIN: CPT | Performed by: PHYSICAL MEDICINE & REHABILITATION

## 2022-03-03 PROCEDURE — G8432 DEP SCR NOT DOC, RNG: HCPCS | Performed by: PHYSICAL MEDICINE & REHABILITATION

## 2022-03-03 PROCEDURE — G8536 NO DOC ELDER MAL SCRN: HCPCS | Performed by: PHYSICAL MEDICINE & REHABILITATION

## 2022-03-03 PROCEDURE — 1101F PT FALLS ASSESS-DOCD LE1/YR: CPT | Performed by: PHYSICAL MEDICINE & REHABILITATION

## 2022-03-03 PROCEDURE — G8419 CALC BMI OUT NRM PARAM NOF/U: HCPCS | Performed by: PHYSICAL MEDICINE & REHABILITATION

## 2022-03-03 PROCEDURE — G8427 DOCREV CUR MEDS BY ELIG CLIN: HCPCS | Performed by: PHYSICAL MEDICINE & REHABILITATION

## 2022-03-03 RX ORDER — GABAPENTIN 100 MG/1
CAPSULE ORAL
Qty: 60 CAPSULE | Refills: 5 | Status: SHIPPED | OUTPATIENT
Start: 2022-03-03

## 2022-03-03 NOTE — PROGRESS NOTES
Brad Soulier presents today for   Chief Complaint   Patient presents with    Back Pain       Is someone accompanying this pt? Yes, male    Is the patient using any DME equipment during [address]? no    Depression Screening:  3 most recent PHQ Screens 3/10/2020   Little interest or pleasure in doing things Not at all   Feeling down, depressed, irritable, or hopeless Not at all   Total Score PHQ 2 0       Learning Assessment:  Learning Assessment 3/24/2014   PRIMARY LEARNER Patient   HIGHEST LEVEL OF EDUCATION - PRIMARY LEARNER  GRADUATED HIGH SCHOOL OR GED   BARRIERS PRIMARY LEARNER NONE   CO-LEARNER CAREGIVER No   PRIMARY LANGUAGE ENGLISH   LEARNER PREFERENCE PRIMARY READING   ANSWERED BY patient    RELATIONSHIP SELF       Abuse Screening:  Abuse Screening Questionnaire 3/3/2022   Do you ever feel afraid of your partner? N   Are you in a relationship with someone who physically or mentally threatens you? N   Is it safe for you to go home? Y       Fall Risk  Fall Risk Assessment, last 12 mths 3/3/2022   Able to walk? Yes   Fall in past 12 months? 1   Do you feel unsteady? 0   Are you worried about falling 0   Is the gait abnormal? 1   Number of falls in past 12 months 1   Fall with injury? 1       Coordination of Care:  1. Have you been to the ER, urgent care clinic since your last visit? no  Hospitalized since your last visit? no    2. Have you seen or consulted any other health care providers outside of the [address] [place] [address] since your last visit? no Include any pap smears or colon screening.  no

## 2022-03-03 NOTE — PROGRESS NOTES
[facility] [place] [address]  [address], [address]  [place]  Phone: [phone]  Fax: [phone]        Gisela Sanz  : 1933  PCP: Irma Ackerman MD    PROGRESS NOTE      ASSESSMENT AND PLAN    Diagnoses and all orders for this visit:    1. Lumbar stenosis with neurogenic claudication  -     gabapentin (NEURONTIN) 100 mg capsule; TAKE 1 CAPSULE BY MOUTH AT BEDTIME AND  ONE  CAPSULE  DAILY  AS  NEEDED  FOR  NERVE  PAIN    2. Spondylolisthesis of lumbar region        1. General Villa is a 80 y.o. male managing with his low back pain. Ambulation is limited. 2. Continue Tylenol PRN as first-line for pain  3. Continue Ibuprofen PRN with food as second line. Reinforced that Tylenol has a safer risk profile than NSAIDs. 4. RF Gabapentin as needed at bedtime  5. Continue gym exercise    Follow-up and Dispositions    · Return in about 6 months (around 9/3/2022) for medication management. HISTORY OF PRESENT ILLNESS      General Villa is a 80 y.o. male presents for follow up of back pain. He reports that his pain is stable. His pain is worse in the morning. He notes that his pain is exacerbated when he overexerts himself. He has intermittent numbness and tingling in his legs that increases with walking. Positive shopping cart sign. Patient states that he exercises daily, which relieves his pain. He continues to take Ibuprofen and Tylenol PRN with benefit. Denies any recent GI ulcers, bleeds or renal dysfunction. He has stopped taking Gabapentin but would like another refill.        Pain Assessment  3/3/2022   Location of Pain Back   Location Modifiers -   Severity of Pain 2   Quality of Pain Dull   Quality of Pain Comment -   Duration of Pain Persistent   Frequency of Pain Constant   Aggravating Factors (No Data)   Aggravating Factors Comment doing too much   Limiting Behavior Some   Relieving Factors Exercises   Relieving Factors Comment -   Result of Injury No         Treatments patient has tried:  Physical therapy:Yes  Doing HEP: Yes  Beneficial medications: Ibuprofen 200 mg qD-BID PRN. Gabapentin 100 mg nightly PRN (tapered from previous 3 times daily). Failed medications: Yes  Spinal injections: Yes LUISA L4-5 10/18 with benefit, previously 3/18 with 6 month benefit     Spinal surgery- No.      L CT 2019: listhesis and FA L4-5, lat recess stenosis     PMHx of pace maker placement, COVID vaccine. Pt enjoys playing golf and is able to continue. 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     PHYSICAL EXAMINATION    Visit Vitals  Pulse 71   Temp 97.6 °F (36.4 °C) (Temporal)   Ht 5' 8\" (1.727 m)   Wt 165 lb (74.8 kg)   SpO2 100% Comment: RA   BMI 25.09 kg/m²     No acute distress, appears younger than his stated age. Accompanied by his son   TTP L4-5  Ambulatory FWB without AD, no forward flexion.   LE strength intact  SLR negative                Written by Court Matthews, as dictated by Mundo Ivory MD.

## 2022-03-19 PROBLEM — M43.16 SPONDYLOLISTHESIS AT L4-L5 LEVEL: Status: ACTIVE | Noted: 2017-10-05

## 2022-03-19 PROBLEM — M48.061 SPINAL STENOSIS AT L4-L5 LEVEL: Status: ACTIVE | Noted: 2017-10-05

## 2022-07-27 NOTE — LETTER
3/3/2022    Patient: Cristóbal Lebron   YOB: 1933   Date of Visit: 3/3/2022     Arielle Means, [address] [address]  [facility]    Dear Arielle Means MD,      Thank you for referring Mr. Lesia Guzman to [facility] for evaluation. My notes for this consultation are attached. If you have questions, please do not hesitate to call me. I look forward to following your patient along with you.       Sincerely,    Livier Mock MD [employer]

## 2022-09-07 ENCOUNTER — VIRTUAL VISIT (OUTPATIENT)
Dept: ORTHOPEDIC SURGERY | Age: 87
End: 2022-09-07
Payer: MEDICARE

## 2022-09-07 DIAGNOSIS — M43.16 SPONDYLOLISTHESIS OF LUMBAR REGION: Primary | ICD-10-CM

## 2022-09-07 DIAGNOSIS — M48.061 BILATERAL STENOSIS OF LATERAL RECESS OF LUMBAR SPINE: ICD-10-CM

## 2022-09-07 PROCEDURE — 99441 PR PHYS/QHP TELEPHONE EVALUATION 5-10 MIN: CPT | Performed by: PHYSICAL MEDICINE & REHABILITATION

## 2022-09-07 NOTE — PROGRESS NOTES
[address], [address]  [facility], [address]  Phone: [phone]  Fax: [phone]      Vita Bourgeois is a 80 y.o. male evaluated via patient initiated telephone call on 9/7/2022. He was evaluated through a synchronous (real-time) audio encounter. The patient (or guardian if applicable) is aware that this is a billable service, which includes applicable co-pays. This Virtual Visit was conducted with patient's (and/or legal guardian's) consent. The visit was conducted pursuant to the emergency declaration under the [address] [place] [address], [address] [place] [address] authority and the [employer] General Act. Patient identification was verified, and a caregiver was present when appropriate. The patient was located at: Home: [address] [address]  The provider was located at: Facility (Appt Department): [address] [place] [address] & [address]       Diagnoses and all orders for this visit:    1. Spondylolisthesis of lumbar region       80year-old gentleman doing quite well with his stenosis and spondylolisthesis. He plays golf weekly. He has been stable for quite some time. I am going to release him back to the care of his primary care physician Dr. Loree Mccurdy. He takes rare gabapentin. Continue PRN Gabapentin and Tylenol   Continue exercising as tolerated    Follow-up and Dispositions    Return if symptoms worsen or fail to improve. HISTORY OF PRESENT ILLNESS  Vita Bourgeois is a 80 y.o. male. Pt presents to the office for a telephone f/u visit for back pain. Pt was switched to a telephone visit due to recent [employer] exposure. Pt was seen in the ED 8/24/2022 after a fall. Pt rolled off the couch and landed on his back. His pain has returned to baseline since the fall. Pt states his pain is exacerbated with standing and walking.  He exercises daily and plays golf once a week. He continues to take PRN Tylenol with benefit. .  Takes rare gabapentin. Denies side effects. Pain Assessment  9/7/2022   Location of Pain Back   Pain Location Comment legs   Location Modifiers -   Severity of Pain 0   Quality of Pain (No Data)   Quality of Pain Comment numbness   Duration of Pain Persistent   Frequency of Pain Constant   Aggravating Factors (No Data)   Aggravating Factors Comment the longer I stand, the more numbness I get   Limiting Behavior Yes   Relieving Factors (No Data)   Relieving Factors Comment sitting down   Result of Injury -       Investigations:  L CT 2019: listhesis and FA L4-5, lat recess stenosis    Treatments patient has tried:  Physical therapy:Yes  Doing HEP: Yes  Beneficial medications: Ibuprofen 200 mg qD-BID PRN. Gabapentin 100 mg nightly PRN (tapered from previous 3 times daily). Failed medications: Yes  Spinal injections: Yes LUISA L4-5 10/18 with benefit, previously 3/18 with 6 month benefit     Spinal surgery- No.      PMHx of pace maker placement, COVID vaccine. Pt enjoys playing golf and is able to continue. 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 Consent:  He and/or health care decision maker is aware that that he may receive a bill for this telephone service, depending on his insurance coverage, and has provided verbal consent to proceed: Yes    I affirm this is a Patient Initiated Episode with an Established Patient who has not had a related appointment within my department in the past 7 days or scheduled within the next 24 hours. Total Time: minutes: 5-10 minutes    Note: not billable if this call serves to triage the patient into an appointment for the relevant concern    Portions of this document were created using Dragon voice recognition software. Unintended errors marianna be present.   Kim Mojica MD       Written by Marcy Perkins, as dictated by Kalani Staton MD.

## 2022-09-07 NOTE — PROGRESS NOTES
Yaritza Leggett presents today for   Chief Complaint   Patient presents with    Back Pain    Leg Pain     Bilateral        Is someone accompanying this pt? Yes, son on phone with pt    Is the patient using any DME equipment during [address]? no    Depression Screening:  3 most recent PHQ Screens 3/10/2020   Little interest or pleasure in doing things Not at all   Feeling down, depressed, irritable, or hopeless Not at all   Total Score PHQ 2 0       Learning Assessment:  Learning Assessment 3/24/2014   PRIMARY LEARNER Patient   HIGHEST LEVEL OF EDUCATION - PRIMARY LEARNER  GRADUATED HIGH SCHOOL OR GED   BARRIERS PRIMARY LEARNER NONE   CO-LEARNER CAREGIVER No   PRIMARY LANGUAGE ENGLISH   LEARNER PREFERENCE PRIMARY READING   ANSWERED BY patient    RELATIONSHIP SELF       Abuse Screening:  Abuse Screening Questionnaire 3/3/2022   Do you ever feel afraid of your partner? N   Are you in a relationship with someone who physically or mentally threatens you? N   Is it safe for you to go home? Y           Coordination of Care:  1. Have you been to the ER, urgent care clinic since your last visit? Yes, pt went to the ER 2 weeks ago for dehydration  Hospitalized since your last visit? no    2. Have you seen or consulted any other health care providers outside of the [address] [place] [address] since your last visit? no Include any pap smears or colon screening.  no

## 2022-09-07 NOTE — LETTER
9/8/2022    Patient: Rupinder Ramos   YOB: 1933   Date of Visit: 9/7/2022     Ricco Kincaid, [address]    Dear Ricco Kincaid MD,      Thank you for referring Mr. Carmen Celis to [address] [place] [address] for evaluation. My notes for this consultation are attached. If you have questions, please do not hesitate to call me. I look forward to following your patient along with you.       Sincerely,    Dinorah Mendez MD

## 2022-09-09 ENCOUNTER — TELEPHONE (OUTPATIENT)
Dept: ORTHOPEDIC SURGERY | Age: 87
End: 2022-09-09

## 2022-09-09 NOTE — TELEPHONE ENCOUNTER
Patient called and said he had a VV appt with Cirilo Pascal on 9/7/22. Patient said that he made a mistake. That Cirilo Pascal had asked him if he is still taking the Gabapentin medication , and he had said \" No\". Patient said he had made a mistake, that  he is still taking the Gabapentin medication. That the medication allows him to Stay Active. Patient said he does not need any medication right now. Patient said that he does not want to be dismissed as her patient. That he wants to continue seeing her in the near future. Patient tel. [phone].

## 2022-09-09 NOTE — TELEPHONE ENCOUNTER
A chart review was done and it was noted that the medication is marked as \"taking\". It is also mentioned in the provider's note that he is still taking the medication as needed. I called and spoke to him. The pt was identified using 2 pt identifiers. He was made aware of this and verbalized understanding. He states Wednesday was confusing for him and he thought he told her something different.

## 2022-10-31 ENCOUNTER — TELEPHONE (OUTPATIENT)
Dept: INTERNAL MEDICINE CLINIC | Age: 87
End: 2022-10-31

## 2022-10-31 NOTE — TELEPHONE ENCOUNTER
Per pt's son pt tested positive for covid. Stated symptoms since Saturday 10/29/22 are fatigue. No other symptoms noted. He did say it was pt's second time having covid.  He also had covid 08/2022    Asking for antiviral medication    Pharmacy is [facility], [address] [place] [address]

## 2022-10-31 NOTE — TELEPHONE ENCOUNTER
Paxlovid sent in but make son be aware pt has not been seen in over 2 years and needs to schedule OV once he has recovered from [place]

## 2023-10-02 ENCOUNTER — NURSE TRIAGE (OUTPATIENT)
Dept: OTHER | Facility: CLINIC | Age: 88
End: 2023-10-02

## 2023-10-02 NOTE — TELEPHONE ENCOUNTER
Limited triage due to patient not being present for assessment    Location of patient: [place]    Received call from [address] at [facility]; Patient with Red Flag Complaint requesting to establish care with [facility]. 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 Patient/Caller agrees with recommended disposition; writer provided warm transfer to [facility] for appointment scheduling    Attention Provider: Thank you for allowing me to participate in the care of your patient. The patient was connected to triage in response to information provided to the [facility]. Please do not respond through this encounter as the response is not directed to a shared pool.       Reason for Disposition   [1] MODERATE dizziness (e.g., interferes with normal activities) AND [2] has been evaluated by doctor (or NP/PA) for this    Protocols used: Dizziness - Lightheadedness-ADULT-AH

## 2023-10-04 ENCOUNTER — OFFICE VISIT (OUTPATIENT)
Age: 88
End: 2023-10-04
Payer: MEDICARE

## 2023-10-04 VITALS
RESPIRATION RATE: 20 BRPM | BODY MASS INDEX: 25.16 KG/M2 | TEMPERATURE: 98.8 F | SYSTOLIC BLOOD PRESSURE: 110 MMHG | HEART RATE: 120 BPM | HEIGHT: 68 IN | WEIGHT: 166 LBS | DIASTOLIC BLOOD PRESSURE: 78 MMHG | OXYGEN SATURATION: 98 %

## 2023-10-04 DIAGNOSIS — I35.0 AORTIC STENOSIS, MILD: ICD-10-CM

## 2023-10-04 DIAGNOSIS — Z00.00 MEDICARE ANNUAL WELLNESS VISIT, SUBSEQUENT: Primary | ICD-10-CM

## 2023-10-04 DIAGNOSIS — I49.5 SSS (SICK SINUS SYNDROME) (HCC): ICD-10-CM

## 2023-10-04 DIAGNOSIS — I49.9 IRREGULAR HEART RATE: ICD-10-CM

## 2023-10-04 DIAGNOSIS — I50.31 ACUTE DIASTOLIC CONGESTIVE HEART FAILURE (HCC): ICD-10-CM

## 2023-10-04 DIAGNOSIS — M48.061 SPINAL STENOSIS AT L4-L5 LEVEL: ICD-10-CM

## 2023-10-04 DIAGNOSIS — I48.11 LONGSTANDING PERSISTENT ATRIAL FIBRILLATION (HCC): ICD-10-CM

## 2023-10-04 PROCEDURE — G8484 FLU IMMUNIZE NO ADMIN: HCPCS | Performed by: INTERNAL MEDICINE

## 2023-10-04 PROCEDURE — G8427 DOCREV CUR MEDS BY ELIG CLIN: HCPCS | Performed by: INTERNAL MEDICINE

## 2023-10-04 PROCEDURE — G8419 CALC BMI OUT NRM PARAM NOF/U: HCPCS | Performed by: INTERNAL MEDICINE

## 2023-10-04 PROCEDURE — 1036F TOBACCO NON-USER: CPT | Performed by: INTERNAL MEDICINE

## 2023-10-04 PROCEDURE — 1123F ACP DISCUSS/DSCN MKR DOCD: CPT | Performed by: INTERNAL MEDICINE

## 2023-10-04 PROCEDURE — 99204 OFFICE O/P NEW MOD 45 MIN: CPT | Performed by: INTERNAL MEDICINE

## 2023-10-04 PROCEDURE — G0439 PPPS, SUBSEQ VISIT: HCPCS | Performed by: INTERNAL MEDICINE

## 2023-10-04 SDOH — ECONOMIC STABILITY: HOUSING INSECURITY
IN THE LAST 12 MONTHS, WAS THERE A TIME WHEN YOU DID NOT HAVE A STEADY PLACE TO SLEEP OR SLEPT IN A SHELTER (INCLUDING NOW)?: NO

## 2023-10-04 SDOH — ECONOMIC STABILITY: FOOD INSECURITY: WITHIN THE PAST 12 MONTHS, YOU WORRIED THAT YOUR FOOD WOULD RUN OUT BEFORE YOU GOT MONEY TO BUY MORE.: NEVER TRUE

## 2023-10-04 SDOH — ECONOMIC STABILITY: INCOME INSECURITY: HOW HARD IS IT FOR YOU TO PAY FOR THE VERY BASICS LIKE FOOD, HOUSING, MEDICAL CARE, AND HEATING?: NOT HARD AT ALL

## 2023-10-04 SDOH — ECONOMIC STABILITY: FOOD INSECURITY: WITHIN THE PAST 12 MONTHS, THE FOOD YOU BOUGHT JUST DIDN'T LAST AND YOU DIDN'T HAVE MONEY TO GET MORE.: NEVER TRUE

## 2023-10-04 ASSESSMENT — ENCOUNTER SYMPTOMS
VOMITING: 0
WHEEZING: 0
BACK PAIN: 1
NAUSEA: 0
DIARRHEA: 0
CHEST TIGHTNESS: 1
CONSTIPATION: 0
COLOR CHANGE: 0
SHORTNESS OF BREATH: 1

## 2023-10-04 ASSESSMENT — PATIENT HEALTH QUESTIONNAIRE - PHQ9
SUM OF ALL RESPONSES TO PHQ9 QUESTIONS 1 & 2: 0
SUM OF ALL RESPONSES TO PHQ QUESTIONS 1-9: 0
SUM OF ALL RESPONSES TO PHQ QUESTIONS 1-9: 0
1. LITTLE INTEREST OR PLEASURE IN DOING THINGS: 0
SUM OF ALL RESPONSES TO PHQ QUESTIONS 1-9: 0
2. FEELING DOWN, DEPRESSED OR HOPELESS: 0
SUM OF ALL RESPONSES TO PHQ QUESTIONS 1-9: 0

## 2023-10-04 ASSESSMENT — LIFESTYLE VARIABLES
HOW OFTEN DO YOU HAVE A DRINK CONTAINING ALCOHOL: MONTHLY OR LESS
HOW MANY STANDARD DRINKS CONTAINING ALCOHOL DO YOU HAVE ON A TYPICAL DAY: 1 OR 2

## 2023-10-04 NOTE — PROGRESS NOTES
Subjective:       Chief Complaint  The patient presents as new patient for Dizziness, Fatigue         HPI  Inga Thompson is a 80 y.o.male who has not been seen in this office in over 3 years and therefore is being evaluated as a new patient. Patient has a known history of atrial fibrillation and is status post pacemaker for sick sinus syndrome. He was followed by Dr Geroge Rinne but has not followed up with him in several years and therefore has had no evaluation of his pacemaker or follow-up in his atrial fibrillation. Continue to patient's son who is with him today he was doing well until about 2 weeks ago when he began to have increasing shortness of breath with exertion. He was also noted that he had increasing lower extremity edema which would not improve in the morning after going to bed at night. Patient over the last 2 days has been complaining of increased shortness of breath associated with fatigue and dizziness which is worse today. Patient was previously on Toprol for his atrial fibs but has not been on this medication for several years. He has a history of high cholesterol but was not on any cholesterol medications. He had no known history of coronary artery disease. Patient was fairly active for his age at 80 until 2 weeks ago. Patient also has a known history of spinal stenosis with neurogenic claudication which he manages with over-the-counter ibuprofen and Neurontin in the past.  He has been to the spine center for management procedures in the past.  Patient also has a history of allergic rhinitis for which he uses Zyrtec as needed.   He has been given allergy shots and has used nasal steroids in the past.      Diet and Lifestyle: generally follows a low fat low cholesterol diet, exercises sporadically       Allergies   Allergen Reactions    Lisinopril Other (See Comments)     Irregular heart rate     Past Medical History:   Diagnosis Date    Back pain     Benign hypertensive heart disease

## 2023-10-04 NOTE — PROGRESS NOTES
Tamara Myers presents today for   Chief Complaint   Patient presents with    Dizziness    Medicare AWV           1. \"Have you been to the ER, urgent care clinic since your last visit? Hospitalized since your last visit? \" no    2. \"Have you seen or consulted any other health care providers outside of the [address] [place] [address] since your last visit? \" no     3. For patients aged 43-73: Has the patient had a colonoscopy / FIT/ Cologuard? NA - based on age      If the patient is female:    4. For patients aged 43-66: Has the patient had a mammogram within the past 2 years? NA - based on age or sex      11. For patients aged 21-65: Has the patient had a pap smear?  NA - based on age or sex

## 2023-10-04 NOTE — PATIENT INSTRUCTIONS
diet alone, but a vitamin D supplement is usually necessary to meet this goal.  When exposed to the sun, use a sunscreen that protects against both UVA and UVB radiation with an SPF of 30 or greater. Reapply every 2 to 3 hours or after sweating, drying off with a towel, or swimming. Always wear a seat belt when traveling in a car. Always wear a helmet when riding a bicycle or motorcycle.

## (undated) DEVICE — SET EPI 18GA L3.5IN TUOHY NDL W/ 20GA CLS TIP NYL CATH

## (undated) DEVICE — MEDIA CONTRAST 10ML 200MG/ML 41%

## (undated) DEVICE — Device: Brand: MEDEX

## (undated) DEVICE — (D)SYR 10ML 1/5ML GRAD NSAF -- PKGING CHANGE USE ITEM 338027

## (undated) DEVICE — (D)BNDG ADHESIVE FABRIC 3/4X3 -- DISC BY MFR USE ITEM 357960

## (undated) DEVICE — NEEDLE EPI 18GA L3.5IN CLR HUB TUOHY W/ WNG PERIFIX

## (undated) DEVICE — SYR 10ML LUER LOK 1/5ML GRAD --